# Patient Record
Sex: MALE | Race: WHITE | Employment: FULL TIME | URBAN - METROPOLITAN AREA
[De-identification: names, ages, dates, MRNs, and addresses within clinical notes are randomized per-mention and may not be internally consistent; named-entity substitution may affect disease eponyms.]

---

## 2017-01-31 RX ORDER — CLOPIDOGREL BISULFATE 75 MG/1
TABLET ORAL
COMMUNITY
End: 2017-04-03 | Stop reason: SDUPTHER

## 2017-01-31 NOTE — PROGRESS NOTES
Colon Screen    Patient was contacted by phone for the documentation reflected in this encounter    Patient: Jacqui Tabares MRN: [id number]  SSN: [id number]    YOB: 1960  Age: 49 y.o. Sex: male        Subjective:   Jacqui Tabares wasreferred due to colonoscopy screening follow up recommendation. PCP is Dusty Alvarez DO. Patient referred for colonoscopy for   Abdominal pain, RLQ, Personal history of colon polyps (screening only). Patient denies abdominal pain,rectal pain or bleeding. Abdominal surgeries as described below, specifically none. Family history as described below, specifically father with colon cancer and colon polyp. Last colonoscopy was 2 years ago in December 2015. .      Is patient currently on Oxygen: no  Is patient taking blood thinners, fluid pills,or medication for diabetes? yes       Does the patient have a history of any condition listed below? Cardiac, pulmonary or hepatic disease? CAD  Severe coronary artery disease or aortic stenosis? Yes   Throat cancer? No  Heart transplant? No  Endocarditis? No  Heart valve replacement? No  Congestive heart failure?  No        No Known Allergies    Past Medical History   Diagnosis Date    CAD (coronary artery disease) 2011     coronary artery stent    Chest pain, unspecified      Abnormal nuclear scan    Diabetes ([facility] [address]     Hypercholesterolemia     Palpitations     Personal history of tobacco use, presenting hazards to health      Past Surgical History   Procedure Laterality Date    Stent insertion      Hx orthopaedic       broken nose    Pr colsc flx w/rmvl of tumor polyp lesion snare tq  12/16/15     Dr. Suzi Joseph f/u 2016    Pr cardiac surg procedure unlist  11/04/2011     stent      Family History   Problem Relation Age of Onset    Cancer Mother     Cancer Maternal Uncle     Hypertension Father     Cancer Father      colon/rectal CA [de-identified] yo    Colon Cancer Father     Colon Polyps Father     Heart Disease Other Positive family history of heart disease     Social History   Substance Use Topics    Smoking status: Former Smoker     Quit date: 12/7/2012    Smokeless tobacco: Never Used    Alcohol use 0.6 oz/week     1 Cans of beer, 0 Standard drinks or equivalent per week      Comment: social      Prior to Admission medications    Medication Sig Start Date End Date Taking? Authorizing Provider   clopidogrel (PLAVIX) 75 mg tab Take  by mouth. Yes Historical Provider   dapagliflozin-metformin (XIGDUO XR) 5-1,000 mg TBph Take 1 Tab by mouth daily. 12/13/16  Yes Jessie Cali, DO   metoprolol succinate (TOPROL-XL) 50 mg XL tablet Take 1 Tab by mouth daily. 12/13/16  Yes Jessie Cali, DO   rosuvastatin (CRESTOR) 20 mg tablet TAKE 1 TABLET BY MOUTH ONCE DAILY 11/9/16  Yes Jessie Cali DO   fenofibrate nanocrystallized (TRICOR) 48 mg tablet TAKE 1 TABLET BY MOUTH DAILY 10/3/16  Yes Momo Young, DO   ONETOUCH ULTRA TEST strip TEST ONCE IN THE AM FASTING, THEN 2 HRS AFTER DINNER. DISP: 180 STRIPS. 2/29/16  Yes Jessie Cali DO   Lancets ([facility]) Misc by Does Not Apply route. Test once in the AM fasting, then 2 hrs after dinner. Disp:  A box for 180 1/16/12  Yes Jessie Cali DO   aspirin 81 mg chewable tablet Take 81 mg by mouth daily. Yes Historical Provider   Blood-Glucose Meter (ONE TOUCH ULTRAMINI) monitoring kit by Does Not Apply route. Test 1 am fasting then 2 hrs after dinner 1/16/12   Jessie Cali DO          Review of Systems   Constitutional: Negative. HENT: Negative for congestion, ear discharge, ear pain, hearing loss, nosebleeds, sore throat and tinnitus. Occasional   Eyes: Negative. Respiratory: Negative. Negative for stridor. Cardiovascular: Positive for chest pain. Negative for palpitations, orthopnea, claudication, leg swelling and PND. Due to stress, pt notified cardiologist    Gastrointestinal: Negative. Genitourinary: Negative.     Musculoskeletal: Positive for neck pain. Negative for back pain, falls, joint pain and myalgias. Due to occupation   Skin: Negative. Neurological: Positive for headaches. Endo/Heme/Allergies: Negative. Psychiatric/Behavioral: Negative. Patient notified of holding his Plavix and Aspirin 7 days prior to colonoscopy and to ask permission from his cardiologist.    Risks colonoscopy described- colon injury, missed lesion, anesthesia problems, bleeding       Tiarra Ni, MANJITN  January 31, 8345  3:15 PM

## 2017-04-02 DIAGNOSIS — I25.10 CORONARY ARTERY DISEASE INVOLVING NATIVE CORONARY ARTERY OF NATIVE HEART WITHOUT ANGINA PECTORIS: ICD-10-CM

## 2017-04-03 RX ORDER — CLOPIDOGREL BISULFATE 75 MG/1
TABLET ORAL
Qty: 90 TAB | Refills: 1 | Status: SHIPPED | OUTPATIENT
Start: 2017-04-03 | End: 2017-06-07

## 2017-06-01 DIAGNOSIS — Z12.11 COLON CANCER SCREENING: Primary | ICD-10-CM

## 2017-06-01 RX ORDER — POLYETHYLENE GLYCOL 3350 17 G/17G
POWDER, FOR SOLUTION ORAL
Qty: 255 G | Refills: 0 | Status: SHIPPED | OUTPATIENT
Start: 2017-06-01 | End: 2020-01-08 | Stop reason: ALTCHOICE

## 2017-06-01 RX ORDER — BISACODYL 5 MG
5 TABLET, DELAYED RELEASE (ENTERIC COATED) ORAL DAILY
Qty: 4 TAB | Refills: 0 | Status: SHIPPED | OUTPATIENT
Start: 2017-06-01 | End: 2017-06-07

## 2017-06-06 ENCOUNTER — ANESTHESIA EVENT (OUTPATIENT)
Dept: ENDOSCOPY | Age: 57
End: 2017-06-06
Payer: COMMERCIAL

## 2017-06-07 ENCOUNTER — HOSPITAL ENCOUNTER (OUTPATIENT)
Age: 57
Setting detail: OUTPATIENT SURGERY
Discharge: HOME | End: 2017-06-07
Attending: COLON & RECTAL SURGERY | Admitting: COLON & RECTAL SURGERY
Payer: COMMERCIAL

## 2017-06-07 ENCOUNTER — ANESTHESIA (OUTPATIENT)
Dept: ENDOSCOPY | Age: 57
End: 2017-06-07
Payer: COMMERCIAL

## 2017-06-07 VITALS
SYSTOLIC BLOOD PRESSURE: 114 MMHG | OXYGEN SATURATION: 100 % | HEART RATE: 68 BPM | DIASTOLIC BLOOD PRESSURE: 72 MMHG | BODY MASS INDEX: 25.48 KG/M2 | WEIGHT: 178 LBS | HEIGHT: 70 IN | RESPIRATION RATE: 14 BRPM | TEMPERATURE: 97.5 F

## 2017-06-07 LAB — GLUCOSE BLD STRIP.AUTO-MCNC: 132 MG/DL (ref 70–110)

## 2017-06-07 PROCEDURE — 76040000007: Performed by: COLON & RECTAL SURGERY

## 2017-06-07 PROCEDURE — 76060000032 HC ANESTHESIA 0.5 TO 1 HR: Performed by: COLON & RECTAL SURGERY

## 2017-06-07 PROCEDURE — 74011250636 HC RX REV CODE- 250/636: Performed by: NURSE ANESTHETIST, CERTIFIED REGISTERED

## 2017-06-07 PROCEDURE — 88305 TISSUE EXAM BY PATHOLOGIST: CPT | Performed by: COLON & RECTAL SURGERY

## 2017-06-07 PROCEDURE — 74011250636 HC RX REV CODE- 250/636

## 2017-06-07 PROCEDURE — 77030009426 HC FCPS BIOP ENDOSC BSC -B: Performed by: COLON & RECTAL SURGERY

## 2017-06-07 PROCEDURE — 82962 GLUCOSE BLOOD TEST: CPT

## 2017-06-07 RX ORDER — MAGNESIUM SULFATE 100 %
4 CRYSTALS MISCELLANEOUS AS NEEDED
Status: DISCONTINUED | OUTPATIENT
Start: 2017-06-07 | End: 2017-06-07 | Stop reason: HOSPADM

## 2017-06-07 RX ORDER — SODIUM CHLORIDE, SODIUM LACTATE, POTASSIUM CHLORIDE, CALCIUM CHLORIDE 600; 310; 30; 20 MG/100ML; MG/100ML; MG/100ML; MG/100ML
75 INJECTION, SOLUTION INTRAVENOUS CONTINUOUS
Status: CANCELLED | OUTPATIENT
Start: 2017-06-07

## 2017-06-07 RX ORDER — INSULIN LISPRO 100 [IU]/ML
INJECTION, SOLUTION INTRAVENOUS; SUBCUTANEOUS ONCE
Status: DISCONTINUED | OUTPATIENT
Start: 2017-06-07 | End: 2017-06-07 | Stop reason: HOSPADM

## 2017-06-07 RX ORDER — INSULIN LISPRO 100 [IU]/ML
INJECTION, SOLUTION INTRAVENOUS; SUBCUTANEOUS ONCE
Status: CANCELLED | OUTPATIENT
Start: 2017-06-07 | End: 2017-06-07

## 2017-06-07 RX ORDER — SODIUM CHLORIDE, SODIUM LACTATE, POTASSIUM CHLORIDE, CALCIUM CHLORIDE 600; 310; 30; 20 MG/100ML; MG/100ML; MG/100ML; MG/100ML
75 INJECTION, SOLUTION INTRAVENOUS CONTINUOUS
Status: DISCONTINUED | OUTPATIENT
Start: 2017-06-07 | End: 2017-06-07 | Stop reason: HOSPADM

## 2017-06-07 RX ORDER — MAGNESIUM SULFATE 100 %
4 CRYSTALS MISCELLANEOUS AS NEEDED
Status: CANCELLED | OUTPATIENT
Start: 2017-06-07

## 2017-06-07 RX ORDER — PROPOFOL 10 MG/ML
INJECTION, EMULSION INTRAVENOUS AS NEEDED
Status: DISCONTINUED | OUTPATIENT
Start: 2017-06-07 | End: 2017-06-07 | Stop reason: HOSPADM

## 2017-06-07 RX ORDER — DEXTROSE 50 % IN WATER (D50W) INTRAVENOUS SYRINGE
25-50 AS NEEDED
Status: DISCONTINUED | OUTPATIENT
Start: 2017-06-07 | End: 2017-06-07 | Stop reason: HOSPADM

## 2017-06-07 RX ORDER — DEXTROSE 50 % IN WATER (D50W) INTRAVENOUS SYRINGE
25-50 AS NEEDED
Status: CANCELLED | OUTPATIENT
Start: 2017-06-07

## 2017-06-07 RX ORDER — SODIUM CHLORIDE 0.9 % (FLUSH) 0.9 %
5-10 SYRINGE (ML) INJECTION AS NEEDED
Status: CANCELLED | OUTPATIENT
Start: 2017-06-07

## 2017-06-07 RX ADMIN — PROPOFOL 30 MG: 10 INJECTION, EMULSION INTRAVENOUS at 09:52

## 2017-06-07 RX ADMIN — PROPOFOL 30 MG: 10 INJECTION, EMULSION INTRAVENOUS at 09:39

## 2017-06-07 RX ADMIN — PROPOFOL 30 MG: 10 INJECTION, EMULSION INTRAVENOUS at 09:45

## 2017-06-07 RX ADMIN — SODIUM CHLORIDE, SODIUM LACTATE, POTASSIUM CHLORIDE, AND CALCIUM CHLORIDE 75 ML/HR: 600; 310; 30; 20 INJECTION, SOLUTION INTRAVENOUS at 09:01

## 2017-06-07 RX ADMIN — PROPOFOL 30 MG: 10 INJECTION, EMULSION INTRAVENOUS at 09:42

## 2017-06-07 RX ADMIN — PROPOFOL 30 MG: 10 INJECTION, EMULSION INTRAVENOUS at 09:59

## 2017-06-07 RX ADMIN — PROPOFOL 50 MG: 10 INJECTION, EMULSION INTRAVENOUS at 10:03

## 2017-06-07 RX ADMIN — PROPOFOL 90 MG: 10 INJECTION, EMULSION INTRAVENOUS at 09:34

## 2017-06-07 RX ADMIN — PROPOFOL 50 MG: 10 INJECTION, EMULSION INTRAVENOUS at 09:49

## 2017-06-07 RX ADMIN — PROPOFOL 30 MG: 10 INJECTION, EMULSION INTRAVENOUS at 10:08

## 2017-06-07 RX ADMIN — PROPOFOL 30 MG: 10 INJECTION, EMULSION INTRAVENOUS at 09:35

## 2017-06-07 NOTE — DISCHARGE INSTRUCTIONS
Per Dr. Quintero Memory: FOLLOW UP VISIT Appointment in: Other (Specify) No aspirin, Plavix or ibuprofen (e.g. Aleve, Motrin, Advil) for 5 days. Repeat colonoscopy in 5 years. Colonoscopy: What to Expect at [address] [place] [address]  After you have a colonoscopy, you will stay at the clinic for 1 to 2 hours until the medicines wear off. Then you can go home. But you will need to arrange for a ride. Your doctor will tell you when you can eat and do your other usual activities. Your doctor will talk to you about when you will need your next colonoscopy. Your doctor can help you decide how often you need to be checked. This will depend on the results of your test and your risk for colorectal cancer. After the test, you may be bloated or have gas pains. You may need to pass gas. If a biopsy was done or a polyp was removed, you may have streaks of blood in your stool (feces) for a few days. This care sheet gives you a general idea about how long it will take for you to recover. But each person recovers at a different pace. Follow the steps below to get better as quickly as possible. How can you care for yourself at home? Activity  · Rest when you feel tired. · You can do your normal activities when it feels okay to do so. Diet  · Follow your doctor's directions for eating. · Unless your doctor has told you not to, drink plenty of fluids. This helps to replace the fluids that were lost during the colon prep. · Do not drink alcohol. Medicines  · If polyps were removed or a biopsy was done during the test, your doctor may tell you not to take aspirin or other anti-inflammatory medicines for a few days. These include ibuprofen (Advil, Motrin) and naproxen (Aleve). Other instructions  · For your safety, do not drive or operate machinery until the medicine wears off and you can think clearly.  Your doctor may tell you not to drive or operate machinery until the day after your test.  · Do not sign legal documents or make major decisions until the medicine wears off and you can think clearly. The anesthesia can make it hard for you to fully understand what you are agreeing to. Follow-up care is a key part of your treatment and safety. Be sure to make and go to all appointments, and call your doctor if you are having problems. It's also a good idea to know your test results and keep a list of the medicines you take. When should you call for help? Call 911 anytime you think you may need emergency care. For example, call if:  · You passed out (lost consciousness). · You pass maroon or bloody stools. · You have severe belly pain. Call your doctor now or seek immediate medical care if:  · Your stools are black and tarlike. · Your stools have streaks of blood, but you did not have a biopsy or any polyps removed. · You have belly pain, or your belly is swollen and firm. · You vomit. · You have a fever. · You are very dizzy. Watch closely for changes in your health, and be sure to contact your doctor if you have any problems. Where can you learn more? Go to [employer] [facility] [facility]  Enter E264 in the search box to learn more about \"Colonoscopy: What to Expect at Home. \"   © 3017-0557 [employer]. Care instructions adapted under license by Denia Presley (which disclaims liability or warranty for this information). This care instruction is for use with your licensed healthcare professional. If you have questions about a medical condition or this instruction, always ask your healthcare professional. [place] [address] any warranty or liability for your use of this information. Content Version: 89.8.969780; Current as of: November 14, 2014     Colon Polyps: Care Instructions  Your Care Instructions    Colon polyps are growths in the colon or the rectum. The cause of most colon polyps is not known, and most people who get them do not have any problems. But a certain kind can turn into cancer.  For this reason, regular testing for colon polyps is important for people age 48 and older and anyone who has an increased risk for colon cancer. Polyps are usually found through routine colon cancer screening tests. Although most colon polyps are not cancerous, they are usually removed and then tested for cancer. Screening for colon cancer saves lives because the cancer can usually be cured if it is caught early. If you have a polyp that is the type that can turn into cancer, you may need more tests to examine your entire colon. The doctor will remove any other polyps that he or she finds, and you will be tested more often. Follow-up care is a key part of your treatment and safety. Be sure to make and go to all appointments, and call your doctor if you are having problems. It's also a good idea to know your test results and keep a list of the medicines you take. How can you care for yourself at home? Regular exams to look for colon polyps are the best way to prevent polyps from turning into colon cancer. These can include stool tests, sigmoidoscopy, colonoscopy, and CT colonography. Talk with your doctor about a testing schedule that is right for you. To prevent polyps  There is no home treatment that can prevent colon polyps. But these steps may help lower your risk for cancer. · Stay active. Being active can help you get to and stay at a healthy weight. Try to exercise on most days of the week. Walking is a good choice. · Eat well. Choose a variety of vegetables, fruits, legumes (such as peas and beans), fish, poultry, and whole grains. · Do not smoke. If you need help quitting, talk to your doctor about stop-smoking programs and medicines. These can increase your chances of quitting for good. · If you drink alcohol, limit how much you drink. Limit alcohol to 2 drinks a day for men and 1 drink a day for women. When should you call for help?   Call your doctor now or seek immediate medical care if:  · You have severe belly pain. · Your stools are maroon or very bloody. Watch closely for changes in your health, and be sure to contact your doctor if:  · You have a fever. · You have nausea or vomiting. · You have a change in bowel habits (new constipation or diarrhea). · Your symptoms get worse or are not improving as expected. Where can you learn more? Go to http://rodney-jacky.info/. Enter 95 [id number] in the search box to learn more about \"Colon Polyps: Care Instructions. \"  Current as of: August 24, 2016  Content Version: 11.2  © 6027-0611 [employer]. Care instructions adapted under license by [employer] (which disclaims liability or warranty for this information). If you have questions about a medical condition or this instruction, always ask your healthcare professional. [address] any warranty or liability for your use of this information. DISCHARGE SUMMARY from Nurse     POST-PROCEDURE INSTRUCTIONS:    Call your Physician if you:  ? Observe any excess bleeding. ? Develop a temperature over 100.5o F.  ? Experience abdominal, shoulder or chest pain. ? Notice any signs of decreased circulation or nerve impairment to an extremity such as a change in color, persistent numbness, tingling, coldness or increase in pain. ? Vomit blood or you have nausea and vomiting lasting longer than 4 hours. ? Are unable to take medications. ? Are unable to urinate within 8 hours after discharge following general anesthesia or intravenous sedation. For the next 24 hours after receiving general anesthesia or intravenous sedation, or while taking prescription Narcotics, limit your activities:  ? Do NOT drive a motor vehicle, operate hazard machinery or power tools, or perform tasks that require coordination. The medication you received during your procedure may have some effect on your mental awareness.   ? Do NOT make important personal or business decisions. The medication you received during your procedure may have some effect on your mental awareness. ? Do NOT drink alcoholic beverages. These drinks do not mix well with the medications that have been given to you. ? Upon discharge from the hospital, you must be accompanied by a responsible adult. ? Resume your diet as directed by your physician. ? Resume medications as your physician has prescribed. ? Please give a list of your current medications to your Primary Care Provider. ? Please update this list whenever your medications are discontinued, doses are changed, or new medications (including over-the-counter products) are added. ? Please carry medication information at all times in case of emergency situations. These are general instructions for a healthy lifestyle:    No smoking/ No tobacco products/ Avoid exposure to second hand smoke.  Surgeon General's Warning:  Quitting smoking now greatly reduces serious risk to your health. Obesity, smoking, and a sedentary lifestyle greatly increase your risk for illness.  A healthy diet, regular physical exercise & weight monitoring are important for maintaining a healthy lifestyle   You may be retaining fluid if you have a history of heart failure or if you experience any of the following symptoms:  Weight gain of 3 pounds or more overnight or 5 pounds in a week, increased swelling in our hands or feet or shortness of breath while lying flat in bed. Please call your doctor as soon as you notice any of these symptoms; do not wait until your next office visit. Recognize signs and symptoms of STROKE:  F  -  Face looks uneven  A  -  Arms unable to move or move unevenly  S  -  Speech slurred or non-existent  T  -  Time to call 911 - as soon as signs and symptoms begin - DO NOT go back to bed or wait to see If you get better - TIME IS BRAIN.     Colorectal Screening   Colorectal cancer almost always develops from precancerous polyps (abnormal growths) in the colon or rectum. Screening tests can find precancerous polyps, so that they can be removed before they turn into cancer. Screening tests can also find colorectal cancer early, when treatment works best.  [facility] Speak with your physician about when you should begin screening and how often you should be tested. Additional Information    If you have questions, please call [phone]. Remember, MyChart is NOT to be used for urgent needs. For medical emergencies, dial 911. Educational references and/or instructions provided during this visit included:    Colon Polyps      APPOINTMENTS:    Please make a follow-up appointment with your physician. Discharge information has been reviewed with the patient. The patient verbalized understanding.

## 2017-06-07 NOTE — IP AVS SNAPSHOT
Maral Landaverde 
 
 
 [address] [address] [address] [address] [phone] Patient: Vilma Leigh MRN: [id number] ALIREZA:5/46[birth year]23 You are allergic to the following No active allergies Recent Documentation Height Weight BMI Smoking Status 1.778 m 80.7 kg 25.54 kg/m2 Former Smoker Emergency Contacts Name Discharge Info Relation Home Work Mobile [address] CAREGIVER [3] Spouse [3] [phone] About your hospitalization You were admitted on:  June 7, 2017 You last received care in the:  [facility] You were discharged on:  June 7, 2017 Unit phone number:  [phone] Why you were hospitalized Your primary diagnosis was:  Not on File Providers Seen During Your Hospitalizations Provider Role Specialty Primary office phone Braulio Delacruz MD Attending Provider Colon and Rectal Surgery [phone] Your Primary Care Physician (PCP) Primary Care Physician Office Phone Office Fax Arlyn Lindo [phone] Follow-up Information Follow up With Details Comments Contact Info Braulio Delacruz MD   [address] [facility] [address] [facility] [address] [place] [address] 
[phone] [facility], DO   [address] [facility] [address] [facility] Amee Gutierrez [address] [id number] [phone] Current Discharge Medication List  
  
CONTINUE these medications which have NOT CHANGED Dose & Instructions Dispensing Information Comments Morning Noon Evening Bedtime Blood-Glucose Meter monitoring kit Commonly known as:  Pelon Garza Your last dose was: Your next dose is:    
   
   
 by Does Not Apply route. Test 1 am fasting then 2 hrs after dinner Quantity:  1 Kit Refills:  0  
     
   
   
   
  
 dapagliflozin-metformin 5-1,000 mg [facility]  
 Commonly known as:  XIGDUO XR Your last dose was: Your next dose is:    
   
   
 Dose:  1 Tab Take 1 Tab by mouth daily. Quantity:  90 Tab Refills:  1  
     
   
   
   
  
 fenofibrate nanocrystallized 48 mg tablet Commonly known as:  [employer] Your last dose was: Your next dose is: TAKE 1 TABLET BY MOUTH DAILY Quantity:  90 Tab Refills:  1 Lancets Misc Commonly known as: One Touch [employer] Esters Your last dose was: Your next dose is:    
   
   
 by Does Not Apply route. Test once in the AM fasting, then 2 hrs after dinner. Disp:  A box for 180 Quantity:  1 Package Refills:  11  
     
   
   
   
  
 metoprolol succinate 50 mg XL tablet Commonly known as:  TOPROL-XL Your last dose was: Your next dose is:    
   
   
 Dose:  50 mg Take 1 Tab by mouth daily. Quantity:  90 Tab Refills:  1 ONETOUCH ULTRA TEST strip Generic drug:  glucose blood VI test strips Your last dose was: Your next dose is:    
   
   
 TEST ONCE IN THE AM FASTING, THEN 2 HRS AFTER DINNER. DISP: 180 STRIPS. Quantity:  180 Strip Refills:  2  
     
   
   
   
  
 polyethylene glycol 17 gram/dose powder Commonly known as:  [employer]ans Your last dose was: Your next dose is: Take as directed by office Quantity:  255 g Refills:  0  
     
   
   
   
  
 rosuvastatin 20 mg tablet Commonly known as:  CRESTOR Your last dose was: Your next dose is: TAKE 1 TABLET BY MOUTH ONCE DAILY Quantity:  90 Tab Refills:  1 STOP taking these medications   
 aspirin 81 mg chewable tablet  
   
  
 bisacodyl 5 mg EC tablet Commonly known as:  DULCOLAX (BISACODYL)  
   
  
 clopidogrel 75 mg Tab Commonly known as:  PLAVIX Discharge Instructions Per Dr. Irene Tang: FOLLOW UP VISIT Appointment in: Other (Specify) No aspirin, Plavix or ibuprofen (e.g. Aleve, Motrin, Advil) for 5 days. Repeat colonoscopy in 5 years. Colonoscopy: What to Expect at [facility] Your Recovery After you have a colonoscopy, you will stay at the clinic for 1 to 2 hours until the medicines wear off. Then you can go home. But you will need to arrange for a ride. Your doctor will tell you when you can eat and do your other usual activities. Your doctor will talk to you about when you will need your next colonoscopy. Your doctor can help you decide how often you need to be checked. This will depend on the results of your test and your risk for colorectal cancer. After the test, you may be bloated or have gas pains. You may need to pass gas. If a biopsy was done or a polyp was removed, you may have streaks of blood in your stool (feces) for a few days. This care sheet gives you a general idea about how long it will take for you to recover. But each person recovers at a different pace. Follow the steps below to get better as quickly as possible. How can you care for yourself at home? Activity · Rest when you feel tired. · You can do your normal activities when it feels okay to do so. Diet · Follow your doctor's directions for eating. · Unless your doctor has told you not to, drink plenty of fluids. This helps to replace the fluids that were lost during the colon prep. · Do not drink alcohol. Medicines · If polyps were removed or a biopsy was done during the test, your doctor may tell you not to take aspirin or other anti-inflammatory medicines for a few days. These include ibuprofen (Advil, Motrin) and naproxen (Aleve). Other instructions · For your safety, do not drive or operate machinery until the medicine wears off and you can think clearly.  Your doctor may tell you not to drive or operate machinery until the day after your test. 
 · Do not sign legal documents or make major decisions until the medicine wears off and you can think clearly. The anesthesia can make it hard for you to fully understand what you are agreeing to. Follow-up care is a key part of your treatment and safety. Be sure to make and go to all appointments, and call your doctor if you are having problems. It's also a good idea to know your test results and keep a list of the medicines you take. When should you call for help? Call 911 anytime you think you may need emergency care. For example, call if: 
· You passed out (lost consciousness). · You pass maroon or bloody stools. · You have severe belly pain. Call your doctor now or seek immediate medical care if: 
· Your stools are black and tarlike. · Your stools have streaks of blood, but you did not have a biopsy or any polyps removed. · You have belly pain, or your belly is swollen and firm. · You vomit. · You have a fever. · You are very dizzy. Watch closely for changes in your health, and be sure to contact your doctor if you have any problems. Where can you learn more? Go to [employer].be Enter E264 in the search box to learn more about \"Colonoscopy: What to Expect at Home. \"  
© 7482-3470 Healthwise, Incorporated. Care instructions adapted under license by Eleonora Mendes (which disclaims liability or warranty for this information). This care instruction is for use with your licensed healthcare professional. If you have questions about a medical condition or this instruction, always ask your healthcare professional. [place] [address] any warranty or liability for your use of this information. Content Version: 32.5.286227; Current as of: November 14, 2014 Colon Polyps: Care Instructions Your Care Instructions Colon polyps are growths in the colon or the rectum.  The cause of most colon polyps is not known, and most people who get them do not have any problems. But a certain kind can turn into cancer. For this reason, regular testing for colon polyps is important for people age 48 and older and anyone who has an increased risk for colon cancer. Polyps are usually found through routine colon cancer screening tests. Although most colon polyps are not cancerous, they are usually removed and then tested for cancer. Screening for colon cancer saves lives because the cancer can usually be cured if it is caught early. If you have a polyp that is the type that can turn into cancer, you may need more tests to examine your entire colon. The doctor will remove any other polyps that he or she finds, and you will be tested more often. Follow-up care is a key part of your treatment and safety. Be sure to make and go to all appointments, and call your doctor if you are having problems. It's also a good idea to know your test results and keep a list of the medicines you take. How can you care for yourself at home? Regular exams to look for colon polyps are the best way to prevent polyps from turning into colon cancer. These can include stool tests, sigmoidoscopy, colonoscopy, and CT colonography. Talk with your doctor about a testing schedule that is right for you. To prevent polyps There is no home treatment that can prevent colon polyps. But these steps may help lower your risk for cancer. · Stay active. Being active can help you get to and stay at a healthy weight. Try to exercise on most days of the week. Walking is a good choice. · Eat well. Choose a variety of vegetables, fruits, legumes (such as peas and beans), fish, poultry, and whole grains. · Do not smoke. If you need help quitting, talk to your doctor about stop-smoking programs and medicines. These can increase your chances of quitting for good. · If you drink alcohol, limit how much you drink. Limit alcohol to 2 drinks a day for men and 1 drink a day for women. When should you call for help? Call your doctor now or seek immediate medical care if: 
· You have severe belly pain. · Your stools are maroon or very bloody. Watch closely for changes in your health, and be sure to contact your doctor if: 
· You have a fever. · You have nausea or vomiting. · You have a change in bowel habits (new constipation or diarrhea). · Your symptoms get worse or are not improving as expected. Where can you learn more? Go to http://rodney-ajcky.info/. Enter [id number] in the search box to learn more about \"Colon Polyps: Care Instructions. \" Current as of: August 24, 2016 Content Version: 11.2 © 6076-2630 [employer]. Care instructions adapted under license by [employer] (which disclaims liability or warranty for this information). If you have questions about a medical condition or this instruction, always ask your healthcare professional. [place] [address] any warranty or liability for your use of this information. DISCHARGE SUMMARY from Nurse POST-PROCEDURE INSTRUCTIONS: 
 
Call your Physician if you: 
? Observe any excess bleeding. ? Develop a temperature over 100.5o F. 
? Experience abdominal, shoulder or chest pain. ? Notice any signs of decreased circulation or nerve impairment to an extremity such as a change in color, persistent numbness, tingling, coldness or increase in pain. ? Vomit blood or you have nausea and vomiting lasting longer than 4 hours. ? Are unable to take medications. ? Are unable to urinate within 8 hours after discharge following general anesthesia or intravenous sedation. For the next 24 hours after receiving general anesthesia or intravenous sedation, or while taking prescription Narcotics, limit your activities: 
? Do NOT drive a motor vehicle, operate hazard machinery or power tools, or perform tasks that require coordination.   The medication you received during your procedure may have some effect on your mental awareness. ? Do NOT make important personal or business decisions. The medication you received during your procedure may have some effect on your mental awareness. ? Do NOT drink alcoholic beverages. These drinks do not mix well with the medications that have been given to you. ? Upon discharge from the hospital, you must be accompanied by a responsible adult. ? Resume your diet as directed by your physician. ? Resume medications as your physician has prescribed. ? Please give a list of your current medications to your Primary Care Provider. ? Please update this list whenever your medications are discontinued, doses are changed, or new medications (including over-the-counter products) are added. ? Please carry medication information at all times in case of emergency situations. These are general instructions for a healthy lifestyle: No smoking/ No tobacco products/ Avoid exposure to second hand smoke. ? Surgeon General's Warning:  Quitting smoking now greatly reduces serious risk to your health. Obesity, smoking, and a sedentary lifestyle greatly increase your risk for illness. ? A healthy diet, regular physical exercise & weight monitoring are important for maintaining a healthy lifestyle ? You may be retaining fluid if you have a history of heart failure or if you experience any of the following symptoms:  Weight gain of 3 pounds or more overnight or 5 pounds in a week, increased swelling in our hands or feet or shortness of breath while lying flat in bed. Please call your doctor as soon as you notice any of these symptoms; do not wait until your next office visit. Recognize signs and symptoms of STROKE: 
F  -  Face looks uneven A  -  Arms unable to move or move unevenly S  -  Speech slurred or non-existent T  -  Time to call 911 - as soon as signs and symptoms begin - DO NOT go back to bed or wait to see If you get better - TIME IS BRAIN. Colorectal Screening ? Colorectal cancer almost always develops from precancerous polyps (abnormal growths) in the colon or rectum. Screening tests can find precancerous polyps, so that they can be removed before they turn into cancer. Screening tests can also find colorectal cancer early, when treatment works best. 
? Speak with your physician about when you should begin screening and how often you should be tested. Additional Information If you have questions, please call [phone]. Remember, [employer] is NOT to be used for urgent needs. For medical emergencies, dial 911. Educational references and/or instructions provided during this visit included: 
 
Colon Polyps APPOINTMENTS: 
 
Please make a follow-up appointment with your physician. Discharge information has been reviewed with the patient. The patient verbalized understanding. Discharge Orders None Introducing [facility] SERVICES! Delio Loza introduces [employer] patient portal. Now you can access parts of your medical record, email your doctor's office, and request medication refills online. 1. In your internet browser, go to [email or web address] 2. Click on the First Time User? Click Here link in the Sign In box. You will see the New Member Sign Up page. 3. Enter your [employer] Access Code exactly as it appears below. You will not need to use this code after youve completed the sign-up process. If you do not sign up before the expiration date, you must request a new code. · [employer] Access Code: [email or web address] Expires: 9/5/2017 10:29 AM 
 
4. Enter the last four digits of your Social Security Number (xxxx) and Date of Birth (mm/dd/yyyy) as indicated and click Submit. You will be taken to the next sign-up page. 5. Create a [employer] ID.  This will be your [employer] login ID and cannot be changed, so think of one that is secure and easy to remember. 6. Create a Mobile System 7 password. You can change your password at any time. 7. Enter your Password Reset Question and Answer. This can be used at a later time if you forget your password. 8. Enter your e-mail address. You will receive e-mail notification when new information is available in [address] 9. Click Sign Up. You can now view and download portions of your medical record. 10. Click the Download Summary menu link to download a portable copy of your medical information. If you have questions, please visit the Frequently Asked Questions section of the [employer] website. Remember, [employer] is NOT to be used for urgent needs. For medical emergencies, dial 911. Now available from your iPhone and Android! General Information Please provide this summary of care documentation to your next provider. Patient Signature:  ____________________________________________________________ Date:  ____________________________________________________________  
  
Rigoberto León Provider Signature:  ____________________________________________________________ Date:  ____________________________________________________________

## 2017-06-07 NOTE — PROCEDURES
[facility]  [address] [place] [address] [address] [facility], [address]  [phone]                    Colonoscopy Procedure Note      Romelia Pierson  1960  [id number]                Date of Procedure: 6/7/2017    Preoperative diagnosis: Z86.010,  Personal history of colon polyps    Postoperative diagnosis: Polyps of Cecum, Polyps of Sigmoid, Rectum    :  Santiago Patton MD    Assistant(s): Endoscopy Technician-1: Carmelo Romero RN-1: Eli Ahmadi RN    Sedation: See Anesthesia Record    Procedure Details:  Prior to the procedure, a history and physical were performed. The patients medications, allergies and sensitivities were reviewed and all questions were answered. After informed consent was obtained for the procedure, with all risks and benefits of procedure explained. The patient was taken to the endoscopy suite and placed in the left lateral decubitus position. Patient identification and proposed procedure were verified prior to the procedure by the nurse and I. Following sequential administration of sedation as per Anesthesia, a digital rectal exam was performed and was normal.  The Olympus video colonoscope was introduced through the anus and advanced to cecum, which was identified by the ileocecal valve and appendiceal orifice. The quality of preparation was good. The colonoscope was slowly withdrawn and the mucosa examined for any abnormalities. Cecal withdrawl time was greater than six minutes. The patient tolerated the procedure well. Findings/Interventions:   Polyps - #1, 5 mm in size, located in the cecum, removed by cold biopsy and sent for pathology, - #2, 5 mm in size, located in the cecum, removed by cold biopsy and sent for pathology, - #3, 5 mm in size, located in the sigmoid, removed by cold biopsy and sent for pathology, - #4, 5 mm in size, located in the rectum, removed by cold biopsy and sent for pathology.     No evidence of recurrent polyp in ascending colon. EBL: none    Recommendations: -Repeat colonoscopy in 5 years. NO aspirin for 5 days.      Discharge Disposition:  Eric Duran MD  6/7/2017  10:12 AM

## 2017-06-07 NOTE — H&P
HPI: Anderson Cruz is a 49 y.o. male presenting with chief complain of history of large ascending colon polyp, [facility] of [facility] 1st degree relative. Past Medical History:   Diagnosis Date    CAD (coronary artery disease) 2011    coronary artery stent    Chest pain, unspecified     Abnormal nuclear scan    Diabetes ([address]     Hypercholesterolemia     Palpitations     Personal history of tobacco use, presenting hazards to health        Past Surgical History:   Procedure Laterality Date    CARDIAC SURG PROCEDURE UNLIST  11/04/2011    stent    HX ORTHOPAEDIC      broken nose    IA COLSC FLX W/RMVL OF TUMOR POLYP LESION SNARE TQ  12/16/15    Dr. Hernandez Cea f/u 2016    STENT INSERTION         Family History   Problem Relation Age of Onset    Cancer Mother     Cancer Maternal Uncle     Hypertension Father     Cancer Father      colon/rectal CA [de-identified] yo    Colon Cancer Father     Colon Polyps Father     Heart Disease Other      Positive family history of heart disease       Social History     Social History    Marital status:      Spouse name: N/A    Number of children: N/A    Years of education: N/A     Social History Main Topics    Smoking status: Former Smoker     Quit date: 12/7/2012    Smokeless tobacco: Never Used    Alcohol use 0.6 oz/week     1 Cans of beer, 0 Standard drinks or equivalent per week      Comment: social    Drug use: No    Sexual activity: Yes     Partners: Female     Other Topics Concern    None     Social History Narrative       Review of Systems - negative    Outpatient Prescriptions Marked as Taking for the 6/7/17 encounter [facility] Encounter)   Medication Sig Dispense Refill    bisacodyl (DULCOLAX, BISACODYL,) 5 mg EC tablet Take 1 Tab by mouth daily. 4 Tab 0    polyethylene glycol (MIRALAX) 17 gram/dose powder Take as directed by office 255 g 0    clopidogrel (PLAVIX) 75 mg tab TAKE 1 TAB BY MOUTH DAILY.  90 Tab 1    dapagliflozin-metformin (XIGDUO XR) 5-1,000 mg TBph Take 1 Tab by mouth daily. 90 Tab 1    metoprolol succinate (TOPROL-XL) 50 mg XL tablet Take 1 Tab by mouth daily. 90 Tab 1    rosuvastatin (CRESTOR) 20 mg tablet TAKE 1 TABLET BY MOUTH ONCE DAILY 90 Tab 1    fenofibrate nanocrystallized (TRICOR) 48 mg tablet TAKE 1 TABLET BY MOUTH DAILY 90 Tab 1    ONETOUCH ULTRA TEST strip TEST ONCE IN THE AM FASTING, THEN 2 HRS AFTER DINNER. DISP: 180 STRIPS. 180 Strip 2    Blood-Glucose Meter (ONE TOUCH ULTRAMINI) monitoring kit by Does Not Apply route. Test 1 am fasting then 2 hrs after dinner 1 Kit 0    Lancets (ONE TOUCH DELICA) Misc by Does Not Apply route. Test once in the AM fasting, then 2 hrs after dinner. Disp:  A box for 180 1 Package 11    aspirin 81 mg chewable tablet Take 81 mg by mouth daily. No Known Allergies    Vitals:    05/30/17 1205 06/07/17 0852   BP:  134/77   Pulse:  69   Resp:  20   Temp:  98.9 °F (37.2 °C)   SpO2:  99%   Weight: 80.7 kg (178 lb) 80.7 kg (178 lb)   Height: 5' 10\" (1.778 m) 5' 10\" (1.778 m)       Physical Exam   Constitutional: He appears well-developed and well-nourished. HENT:   Head: Normocephalic and atraumatic. Eyes: Conjunctivae and EOM are normal.   Abdominal: Soft. He exhibits no distension and no mass. There is no tenderness. Musculoskeletal: Normal range of motion. Lymphadenopathy:     He has no cervical adenopathy. Right: No inguinal adenopathy present. Left: No inguinal adenopathy present. Neurological: He exhibits normal muscle tone. Skin: Skin is warm and dry. Psychiatric: He has a normal mood and affect. His speech is normal.       Assessment / Plan    colonoscopy    The diagnoses and plan were discussed with the patient. All questions answered. Plan of care agreed to by all concerned.

## 2017-06-07 NOTE — ANESTHESIA PREPROCEDURE EVALUATION
Anesthetic History   No history of anesthetic complications            Review of Systems / Medical History  Patient summary reviewed and pertinent labs reviewed    Pulmonary  Within defined limits                 Neuro/Psych   Within defined limits           Cardiovascular              CAD    Exercise tolerance: >4 METS     GI/Hepatic/Renal  Within defined limits              Endo/Other    Diabetes: type 2         Other Findings   Comments:   Risk Factors for Postoperative nausea/vomiting:       History of postoperative nausea/vomiting? NO       Female? NO       Motion sickness? NO       Intended opioid administration for postoperative analgesia? NO      Smoking Abstinence  Current Smoker? NO  Elective Surgery? YES  Seen preoperatively by anesthesiologist or proxy prior to day of surgery? YES  Pt abstained from smoking 24 hours prior to anesthesia?  N/A           Physical Exam    Airway  Mallampati: III  TM Distance: 4 - 6 cm  Neck ROM: normal range of motion   Mouth opening: Diminished (comment)     Cardiovascular    Rhythm: regular  Rate: normal         Dental  No notable dental hx       Pulmonary  Breath sounds clear to auscultation               Abdominal  GI exam deferred       Other Findings            Anesthetic Plan    ASA: 3  Anesthesia type: MAC            Anesthetic plan and risks discussed with: Patient

## 2017-06-07 NOTE — ANESTHESIA POSTPROCEDURE EVALUATION
Post-Anesthesia Evaluation and Assessment    Patient: Mirian Genao MRN: [id number]  SSN: [id number]    YOB: 1960  Age: 49 y.o. Sex: male       Cardiovascular Function/Vital Signs  Visit Vitals    /82    Pulse 74    Temp 36 °C (96.8 °F)    Resp 12    Ht 5' 10\" (1.778 m)    Wt 80.7 kg (178 lb)    SpO2 96%    BMI 25.54 kg/m2       Patient is status post MAC anesthesia for Procedure(s):  COLONOSCOPY / polypectomy. Nausea/Vomiting: None    Postoperative hydration reviewed and adequate. Pain:  Pain Scale 1: Numeric (0 - 10) (06/07/17 0852)  Pain Intensity 1: 0 (06/07/17 0932)   Managed    Neurological Status: At baseline    Mental Status and Level of Consciousness: Arousable    Pulmonary Status:   O2 Device: Nasal cannula (06/07/17 1011)   Adequate oxygenation and airway patent    Complications related to anesthesia: None    Post-anesthesia assessment completed.  No concerns    Signed By: Gay Sahu MD     June 7, 2017

## 2017-06-20 ENCOUNTER — TELEPHONE (OUTPATIENT)
Dept: SURGERY | Age: 57
End: 2017-06-20

## 2017-06-20 NOTE — TELEPHONE ENCOUNTER
----- Message from Santiago Patton MD sent at 6/9/2017 12:40 PM EDT -----  Benign polyp(s). Repeat colonoscopy in 5 years as planned. Notified patient of pathology results. Lisa in [place] for 5 years. Patient understands.

## 2017-08-14 ENCOUNTER — OFFICE VISIT (OUTPATIENT)
Dept: FAMILY MEDICINE CLINIC | Age: 57
End: 2017-08-14

## 2017-08-14 VITALS
WEIGHT: 176 LBS | OXYGEN SATURATION: 97 % | HEART RATE: 80 BPM | DIASTOLIC BLOOD PRESSURE: 84 MMHG | TEMPERATURE: 98.4 F | RESPIRATION RATE: 12 BRPM | BODY MASS INDEX: 25.2 KG/M2 | HEIGHT: 70 IN | SYSTOLIC BLOOD PRESSURE: 134 MMHG

## 2017-08-14 DIAGNOSIS — E11.9 TYPE 2 DIABETES MELLITUS WITHOUT COMPLICATION, WITHOUT LONG-TERM CURRENT USE OF INSULIN (HCC): ICD-10-CM

## 2017-08-14 DIAGNOSIS — E78.00 HYPERCHOLESTEREMIA: ICD-10-CM

## 2017-08-14 DIAGNOSIS — I25.10 CORONARY ARTERY DISEASE INVOLVING NATIVE CORONARY ARTERY OF NATIVE HEART WITHOUT ANGINA PECTORIS: ICD-10-CM

## 2017-08-14 RX ORDER — METOPROLOL SUCCINATE 50 MG/1
50 TABLET, EXTENDED RELEASE ORAL DAILY
Qty: 90 TAB | Refills: 1 | Status: SHIPPED | OUTPATIENT
Start: 2017-08-14 | End: 2018-08-09 | Stop reason: SDUPTHER

## 2017-08-14 RX ORDER — FENOFIBRATE 48 MG/1
TABLET, COATED ORAL
Qty: 90 TAB | Refills: 1 | Status: SHIPPED | OUTPATIENT
Start: 2017-08-14 | End: 2018-07-25 | Stop reason: SDUPTHER

## 2017-08-14 RX ORDER — ROSUVASTATIN CALCIUM 20 MG/1
TABLET, COATED ORAL
Qty: 90 TAB | Refills: 1 | Status: SHIPPED | OUTPATIENT
Start: 2017-08-14 | End: 2018-08-09 | Stop reason: SDUPTHER

## 2017-08-14 NOTE — MR AVS SNAPSHOT
Visit Information Date & Time Provider Department Dept. Phone Encounter #  
 8/14/2017  4:20 PM Elton Yun, [address]  [id number] Follow-up Instructions Return in about 6 months (around 2/14/2018) for diabetes, blood pressure, cholesterol. Follow-up and Disposition History Upcoming Health Maintenance Date Due Hepatitis C Screening 1960 Pneumococcal 19-64 Medium Risk (1 of 1 - PPSV23) 6/19/1981 DTaP/Tdap/Td series (1 - Tdap) 6/19/1983 EYE EXAM RETINAL OR DILATED Q1 8/14/2015 HEMOGLOBIN A1C Q6M 2/8/2017 FOOT EXAM Q1 4/1/2017 MICROALBUMIN Q1 4/1/2017 INFLUENZA AGE 9 TO ADULT 8/1/2017 LIPID PANEL Q1 8/8/2017 COLONOSCOPY 6/7/2018 Allergies as of 8/14/2017  Review Complete On: 8/14/2017 By: Elton Yun, DO No Known Allergies Current Immunizations  Never Reviewed No immunizations on file. Not reviewed this visit You Were Diagnosed With   
  
 Codes Comments Type 2 diabetes mellitus without complication, without long-term current use of insulin (HCC)     ICD-10-CM: E11.9 ICD-9-CM: 250.00 Coronary artery disease involving native coronary artery of native heart without angina pectoris     ICD-10-CM: I25.10 ICD-9-CM: 414.01 Hypercholesteremia     ICD-10-CM: E78.00 ICD-9-CM: 272.0 Vitals BP Pulse Temp Resp Height(growth percentile) Weight(growth percentile) 134/84 (BP 1 Location: Right arm, BP Patient Position: Sitting) 80 98.4 °F (36.9 °C) (Oral) 12 5' 10\" (1.778 m) 176 lb (79.8 kg) SpO2 BMI Smoking Status 97% 25.25 kg/m2 Former Smoker BMI and BSA Data Body Mass Index Body Surface Area  
 25.25 kg/m 2 1.99 m 2 Preferred Pharmacy Pharmacy Name Phone [id number] Jose Luis Barbour, [address] [facility],4Th Floor [facility] [phone] Your Updated Medication List  
  
   
 This list is accurate as of: 8/14/17  4:26 PM.  Always use your most recent med list.  
  
  
  
  
 Blood-Glucose Meter monitoring kit Commonly known as:  Azra Cathi  
by Does Not Apply route. Test 1 am fasting then 2 hrs after dinner  
  
 dapagliflozin-metformin 5-1,000 mg Tbph  
Commonly known as:  XIGDUO XR Take 1 Tab by mouth daily. fenofibrate nanocrystallized 48 mg tablet Commonly known as:  TRICOR  
TAKE 1 TABLET BY MOUTH DAILY Lancets Misc Commonly known as: One Touch Delica  
by Does Not Apply route. Test once in the AM fasting, then 2 hrs after dinner. Disp:  A box for 180  
  
 metoprolol succinate 50 mg XL tablet Commonly known as:  TOPROL-XL Take 1 Tab by mouth daily. ONETOUCH ULTRA TEST strip Generic drug:  glucose blood VI test strips TEST ONCE IN THE AM FASTING, THEN 2 HRS AFTER DINNER. DISP: 180 STRIPS. polyethylene glycol 17 gram/dose powder Commonly known as:  Kimberlee Lobstein Take as directed by office  
  
 rosuvastatin 20 mg tablet Commonly known as:  CRESTOR  
TAKE 1 TABLET BY MOUTH ONCE DAILY Prescriptions Sent to Pharmacy Refills  
 dapagliflozin-metformin (XIGDUO XR) 5-1,000 mg TBph 1 Sig: Take 1 Tab by mouth daily. Class: Normal  
 Pharmacy: [facility]/pharmacy [address] [place] [address], [address] [place] [address],[address] [place] [address] Ph #: [phone] Route: Oral  
 metoprolol succinate (TOPROL-XL) 50 mg XL tablet 1 Sig: Take 1 Tab by mouth daily. Class: Normal  
 Pharmacy: [facility]/pharmacy [address] [place] [address], [address] [place] [address],[address] [facility] Ph #: [phone] Route: Oral  
 rosuvastatin (CRESTOR) 20 mg tablet 1 Sig: TAKE 1 TABLET BY MOUTH ONCE DAILY Class: Normal  
 Pharmacy: [facility]/pharmacy #[id number] [facility] [address] Ph #: [phone]  
 fenofibrate nanocrystallized (TRICOR) 48 mg tablet 1 Sig: TAKE 1 TABLET BY MOUTH DAILY  Class: Normal  
 Pharmacy: [facility]/pharmacy #[id number] [facility], [address] [place] [address],4Th Floor R [facility] [address] Ph #: [phone] Follow-up Instructions Return in about 6 months (around 2/14/2018) for diabetes, blood pressure, cholesterol. To-Do List   
 08/14/2017 Lab:  HEMOGLOBIN A1C WITH EAG Around 11/12/2017 Lab:  LIPID PANEL Around 11/12/2017 Lab:  METABOLIC PANEL, COMPREHENSIVE Patient Instructions Learning About Diabetes Food Guidelines Your Care Instructions Meal planning is important to manage diabetes. It helps keep your blood sugar at a target level (which you set with your doctor). You don't have to eat special foods. You can eat what your family eats, including sweets once in a while. But you do have to pay attention to how often you eat and how much you eat of certain foods. You may want to work with a dietitian or a certified diabetes educator (CDE) to help you plan meals and snacks. A dietitian or CDE can also help you lose weight if that is one of your goals. What should you know about eating carbs? Managing the amount of carbohydrate (carbs) you eat is an important part of healthy meals when you have diabetes. Carbohydrate is found in many foods. · Learn which foods have carbs. And learn the amounts of carbs in different foods. ¨ Bread, cereal, pasta, and rice have about 15 grams of carbs in a serving. A serving is 1 slice of bread (1 ounce), ½ cup of cooked cereal, or 1/3 cup of cooked pasta or rice. ¨ Fruits have 15 grams of carbs in a serving. A serving is 1 small fresh fruit, such as an apple or orange; ½ of a banana; ½ cup of cooked or canned fruit; ½ cup of fruit juice; 1 cup of melon or raspberries; or 2 tablespoons of dried fruit. ¨ Milk and no-sugar-added yogurt have 15 grams of carbs in a serving. A serving is 1 cup of milk or 2/3 cup of no-sugar-added yogurt. ¨ Starchy vegetables have 15 grams of carbs in a serving.  A serving is ½ cup of mashed potatoes or sweet potato; 1 cup winter squash; ½ of a small baked potato; ½ cup of cooked beans; or ½ cup cooked corn or green peas. · Learn how much carbs to eat each day and at each meal. A dietitian or CDE can teach you how to keep track of the amount of carbs you eat. This is called carbohydrate counting. · If you are not sure how to count carbohydrate grams, use the Plate Method to plan meals. It is a good, quick way to make sure that you have a balanced meal. It also helps you spread carbs throughout the day. ¨ Divide your plate by types of foods. Put non-starchy vegetables on half the plate, meat or other protein food on one-quarter of the plate, and a grain or starchy vegetable in the final quarter of the plate. To this you can add a small piece of fruit and 1 cup of milk or yogurt, depending on how many carbs you are supposed to eat at a meal. 
· Try to eat about the same amount of carbs at each meal. Do not \"save up\" your daily allowance of carbs to eat at one meal. 
· Proteins have very little or no carbs per serving. Examples of proteins are beef, chicken, turkey, fish, eggs, tofu, cheese, cottage cheese, and peanut butter. A serving size of meat is 3 ounces, which is about the size of a deck of cards. Examples of meat substitute serving sizes (equal to 1 ounce of meat) are 1/4 cup of cottage cheese, 1 egg, 1 tablespoon of peanut butter, and ½ cup of tofu. How can you eat out and still eat healthy? · Learn to estimate the serving sizes of foods that have carbohydrate. If you measure food at home, it will be easier to estimate the amount in a serving of restaurant food. · If the meal you order has too much carbohydrate (such as potatoes, corn, or baked beans), ask to have a low-carbohydrate food instead. Ask for a salad or green vegetables. · If you use insulin, check your blood sugar before and after eating out to help you plan how much to eat in the future.  
· If you eat more carbohydrate at a meal than you had planned, take a walk or do other exercise. This will help lower your blood sugar. What else should you know? · Limit saturated fat, such as the fat from meat and dairy products. This is a healthy choice because people who have diabetes are at higher risk of heart disease. So choose lean cuts of meat and nonfat or low-fat dairy products. Use olive or canola oil instead of butter or shortening when cooking. · Don't skip meals. Your blood sugar may drop too low if you skip meals and take insulin or certain medicines for diabetes. · Check with your doctor before you drink alcohol. Alcohol can cause your blood sugar to drop too low. Alcohol can also cause a bad reaction if you take certain diabetes medicines. Follow-up care is a key part of your treatment and safety. Be sure to make and go to all appointments, and call your doctor if you are having problems. It's also a good idea to know your test results and keep a list of the medicines you take. Where can you learn more? Go to http://rodney-jacky.info/. Enter G653 in the search box to learn more about \"Learning About Diabetes Food Guidelines. \" Current as of: March 13, 2017 Content Version: 11.3 © 1058-6033 [employer]. Care instructions adapted under license by [employer] (which disclaims liability or warranty for this information). If you have questions about a medical condition or this instruction, always ask your healthcare professional. [place] [address] any warranty or liability for your use of this information. Introducing [facility] & HEALTH SERVICES! [employer] introduces [employer] patient portal. Now you can access parts of your medical record, email your doctor's office, and request medication refills online. 1. In your internet browser, go to [email or web address] 2. Click on the First Time User? Click Here link in the Sign In box. You will see the New Member Sign Up page. 3. Enter your Connect HQ Access Code exactly as it appears below. You will not need to use this code after youve completed the sign-up process. If you do not sign up before the expiration date, you must request a new code. · Connect HQ Access Code: [email or web address] Expires: 9/5/2017 10:29 AM 
 
4. Enter the last four digits of your Social Security Number (xxxx) and Date of Birth (mm/dd/yyyy) as indicated and click Submit. You will be taken to the next sign-up page. 5. Create a Connect HQ ID. This will be your Connect HQ login ID and cannot be changed, so think of one that is secure and easy to remember. 6. Create a Connect HQ password. You can change your password at any time. 7. Enter your Password Reset Question and Answer. This can be used at a later time if you forget your password. 8. Enter your e-mail address. You will receive e-mail notification when new information is available in [address] 9. Click Sign Up. You can now view and download portions of your medical record. 10. Click the Download Summary menu link to download a portable copy of your medical information. If you have questions, please visit the Frequently Asked Questions section of the Connect HQ website. Remember, Connect HQ is NOT to be used for urgent needs. For medical emergencies, dial 911. Now available from your iPhone and Android! Please provide this summary of care documentation to your next provider. Your primary care clinician is listed as Ralph Cornelius. If you have any questions after today's visit, please call [phone].

## 2017-08-14 NOTE — PATIENT INSTRUCTIONS

## 2017-08-14 NOTE — PROGRESS NOTES
1. Have you been to the ER, urgent care clinic since your last visit? Hospitalized since your last visit? No    2. Have you seen or consulted any other health care providers outside of the [address] [place] [address] since your last visit? Include any pap smears or colon screening.  Yes - [facility]

## 2017-08-14 NOTE — PROGRESS NOTES
HISTORY OF PRESENT Jim Colunga is a 56y.o. year old male here today for:  DM, HTN, HLD    Doing well with above and needs refills and labs evaluated. No side effects. Current Outpatient Prescriptions   Medication Sig Dispense Refill    dapagliflozin-metformin (XIGDUO XR) 5-1,000 mg TBph Take 1 Tab by mouth daily. 90 Tab 1    metoprolol succinate (TOPROL-XL) 50 mg XL tablet Take 1 Tab by mouth daily. 90 Tab 1    rosuvastatin (CRESTOR) 20 mg tablet TAKE 1 TABLET BY MOUTH ONCE DAILY 90 Tab 1    fenofibrate nanocrystallized (TRICOR) 48 mg tablet TAKE 1 TABLET BY MOUTH DAILY 90 Tab 1    ONETOUCH ULTRA TEST strip TEST ONCE IN THE AM FASTING, THEN 2 HRS AFTER DINNER. DISP: 180 STRIPS. 180 Strip 2    Blood-Glucose Meter (ONE TOUCH ULTRAMINI) monitoring kit by Does Not Apply route. Test 1 am fasting then 2 hrs after dinner 1 Kit 0    Lancets (ONE TOUCH DELICA) Misc by Does Not Apply route. Test once in the AM fasting, then 2 hrs after dinner.   Disp:  A box for 180 1 Package 11    polyethylene glycol (MIRALAX) 17 gram/dose powder Take as directed by office 255 g 0     Past Medical History:   Diagnosis Date    CAD (coronary artery disease) 2011    coronary artery stent    Chest pain, unspecified     Abnormal nuclear scan    Diabetes ([address]     Hypercholesterolemia     Palpitations     Personal history of tobacco use, presenting hazards to health      Social History     Social History    Marital status:      Spouse name: N/A    Number of children: N/A    Years of education: N/A     Social History Main Topics    Smoking status: Former Smoker     Quit date: 12/7/2012    Smokeless tobacco: Never Used    Alcohol use 0.6 oz/week     1 Cans of beer, 0 Standard drinks or equivalent per week      Comment: social    Drug use: No    Sexual activity: Yes     Partners: Female     Other Topics Concern    None     Social History Narrative     Family History   Problem Relation Age of Onset    Cancer Mother     Cancer Maternal Uncle     Hypertension Father     Cancer Father      colon/rectal CA [de-identified] yo    Colon Cancer Father     Colon Polyps Father     Heart Disease Other      Positive family history of heart disease       ROS:  No numb, tingle, swell    Objective:  Visit Vitals    /84 (BP 1 Location: Right arm, BP Patient Position: Sitting)    Pulse 80    Temp 98.4 °F (36.9 °C) (Oral)    Resp 12    Ht 5' 10\" (1.778 m)    Wt 176 lb (79.8 kg)    SpO2 97%    BMI 25.25 kg/m2     GEN:  Appears stated age in NAD. HEENT: Conjunctiva/lids normal.  External ears and nose without lesions/trauma. Hearing Intact. Tongue midline. NECK: Trachea midline. Supple. Full ROM  CARDIAC:  regular rate and rhythm. no Murmur, no peripheral edema. LUNGS: lungs clear to auscultation, no accessory muscle use. MS: no clubbing/cyanosis. SKIN: Warm/dry without rash. PSYCH: Appropriate insight, Judgment. A&O x 3. Assessment/Plan:     ICD-10-CM ICD-9-CM    1. Type 2 diabetes mellitus without complication, without long-term current use of insulin (HCC) E11.9 250.00 dapagliflozin-metformin (XIGDUO XR) 5-1,000 mg TBph      METABOLIC PANEL, COMPREHENSIVE      HEMOGLOBIN A1C WITH EAG   2. Coronary artery disease involving native coronary artery of native heart without angina pectoris I25.10 414.01 metoprolol succinate (TOPROL-XL) 50 mg XL tablet      rosuvastatin (CRESTOR) 20 mg tablet   3. Hypercholesteremia E78.00 272.0 rosuvastatin (CRESTOR) 20 mg tablet      fenofibrate nanocrystallized (TRICOR) 48 mg tablet      METABOLIC PANEL, COMPREHENSIVE      LIPID PANEL     Patient verbalizes understanding of evaluation and plan. Will refill as above and await labs and I will notify results.

## 2017-10-06 DIAGNOSIS — I25.10 CORONARY ARTERY DISEASE INVOLVING NATIVE CORONARY ARTERY OF NATIVE HEART WITHOUT ANGINA PECTORIS: ICD-10-CM

## 2017-10-06 RX ORDER — CLOPIDOGREL BISULFATE 75 MG/1
TABLET ORAL
Qty: 90 TAB | Refills: 0 | Status: SHIPPED | OUTPATIENT
Start: 2017-10-06 | End: 2018-08-09 | Stop reason: SDUPTHER

## 2018-03-09 DIAGNOSIS — E78.00 HYPERCHOLESTEREMIA: ICD-10-CM

## 2018-03-12 RX ORDER — FENOFIBRATE 48 MG/1
TABLET, COATED ORAL
Qty: 90 TAB | Refills: 1 | OUTPATIENT
Start: 2018-03-12

## 2018-04-03 NOTE — TELEPHONE ENCOUNTER
Left non detailed message for return call re: medication request and S Philip's message      appt needed

## 2018-07-25 DIAGNOSIS — E78.00 HYPERCHOLESTEREMIA: ICD-10-CM

## 2018-07-25 NOTE — TELEPHONE ENCOUNTER
Requested Prescriptions     Pending Prescriptions Disp Refills    fenofibrate nanocrystallized (TRICOR) 48 mg tablet 90 Tab 1     Sig: TAKE 1 TABLET BY MOUTH DAILY        Pt has made a f/u appt with SONIDO Palacios for 08/09/2018

## 2018-07-26 RX ORDER — FENOFIBRATE 48 MG/1
TABLET, COATED ORAL
Qty: 15 TAB | Refills: 0 | Status: SHIPPED | OUTPATIENT
Start: 2018-07-26 | End: 2018-08-09 | Stop reason: SDUPTHER

## 2018-08-09 ENCOUNTER — HOSPITAL ENCOUNTER (OUTPATIENT)
Dept: LAB | Age: 58
Discharge: HOME | End: 2018-08-09
Payer: COMMERCIAL

## 2018-08-09 ENCOUNTER — OFFICE VISIT (OUTPATIENT)
Dept: FAMILY MEDICINE CLINIC | Age: 58
End: 2018-08-09

## 2018-08-09 VITALS
BODY MASS INDEX: 24.91 KG/M2 | WEIGHT: 174 LBS | SYSTOLIC BLOOD PRESSURE: 113 MMHG | RESPIRATION RATE: 16 BRPM | HEART RATE: 90 BPM | TEMPERATURE: 98.3 F | DIASTOLIC BLOOD PRESSURE: 76 MMHG | OXYGEN SATURATION: 96 % | HEIGHT: 70 IN

## 2018-08-09 DIAGNOSIS — I25.10 CORONARY ARTERY DISEASE INVOLVING NATIVE CORONARY ARTERY OF NATIVE HEART WITHOUT ANGINA PECTORIS: ICD-10-CM

## 2018-08-09 DIAGNOSIS — I10 ESSENTIAL HYPERTENSION: ICD-10-CM

## 2018-08-09 DIAGNOSIS — E78.00 HYPERCHOLESTEREMIA: ICD-10-CM

## 2018-08-09 DIAGNOSIS — Z11.59 ENCOUNTER FOR HEPATITIS C SCREENING TEST FOR LOW RISK PATIENT: ICD-10-CM

## 2018-08-09 DIAGNOSIS — E11.9 TYPE 2 DIABETES MELLITUS WITHOUT COMPLICATION, WITHOUT LONG-TERM CURRENT USE OF INSULIN (HCC): ICD-10-CM

## 2018-08-09 DIAGNOSIS — I10 ESSENTIAL HYPERTENSION: Primary | ICD-10-CM

## 2018-08-09 LAB
ALBUMIN SERPL-MCNC: 4.2 G/DL (ref 3.4–5)
ALBUMIN/GLOB SERPL: 1.4 {RATIO} (ref 0.8–1.7)
ALP SERPL-CCNC: 75 U/L (ref 45–117)
ALT SERPL-CCNC: 41 U/L (ref 16–61)
ANION GAP SERPL CALC-SCNC: 13 MMOL/L (ref 3–18)
AST SERPL-CCNC: 30 U/L (ref 15–37)
BILIRUB SERPL-MCNC: 0.3 MG/DL (ref 0.2–1)
BUN SERPL-MCNC: 16 MG/DL (ref 7–18)
BUN/CREAT SERPL: 17 (ref 12–20)
CALCIUM SERPL-MCNC: 9.3 MG/DL (ref 8.5–10.1)
CHLORIDE SERPL-SCNC: 105 MMOL/L (ref 100–108)
CHOLEST SERPL-MCNC: 148 MG/DL
CO2 SERPL-SCNC: 23 MMOL/L (ref 21–32)
CREAT SERPL-MCNC: 0.94 MG/DL (ref 0.6–1.3)
EST. AVERAGE GLUCOSE BLD GHB EST-MCNC: 151 MG/DL
GLOBULIN SER CALC-MCNC: 2.9 G/DL (ref 2–4)
GLUCOSE SERPL-MCNC: 172 MG/DL (ref 74–99)
HBA1C MFR BLD: 6.9 % (ref 4.2–5.6)
HDLC SERPL-MCNC: 43 MG/DL (ref 40–60)
HDLC SERPL: 3.4 {RATIO} (ref 0–5)
LDLC SERPL CALC-MCNC: ABNORMAL MG/DL (ref 0–100)
LIPID PROFILE,FLP: ABNORMAL
POTASSIUM SERPL-SCNC: 4.1 MMOL/L (ref 3.5–5.5)
PROT SERPL-MCNC: 7.1 G/DL (ref 6.4–8.2)
SODIUM SERPL-SCNC: 141 MMOL/L (ref 136–145)
TRIGL SERPL-MCNC: 573 MG/DL (ref ?–150)
VLDLC SERPL CALC-MCNC: ABNORMAL MG/DL

## 2018-08-09 PROCEDURE — 83036 HEMOGLOBIN GLYCOSYLATED A1C: CPT | Performed by: NURSE PRACTITIONER

## 2018-08-09 PROCEDURE — 82043 UR ALBUMIN QUANTITATIVE: CPT | Performed by: NURSE PRACTITIONER

## 2018-08-09 PROCEDURE — 80053 COMPREHEN METABOLIC PANEL: CPT | Performed by: NURSE PRACTITIONER

## 2018-08-09 PROCEDURE — 36415 COLL VENOUS BLD VENIPUNCTURE: CPT | Performed by: NURSE PRACTITIONER

## 2018-08-09 PROCEDURE — 87521 HEPATITIS C PROBE&RVRS TRNSC: CPT | Performed by: NURSE PRACTITIONER

## 2018-08-09 PROCEDURE — 80061 LIPID PANEL: CPT | Performed by: NURSE PRACTITIONER

## 2018-08-09 RX ORDER — FENOFIBRATE 48 MG/1
TABLET, COATED ORAL
Qty: 15 TAB | Refills: 0 | Status: SHIPPED | OUTPATIENT
Start: 2018-08-09 | End: 2018-08-21 | Stop reason: SDUPTHER

## 2018-08-09 RX ORDER — ROSUVASTATIN CALCIUM 20 MG/1
TABLET, COATED ORAL
Qty: 90 TAB | Refills: 1 | Status: SHIPPED | OUTPATIENT
Start: 2018-08-09 | End: 2019-02-11 | Stop reason: SDUPTHER

## 2018-08-09 RX ORDER — CLOPIDOGREL BISULFATE 75 MG/1
TABLET ORAL
Qty: 90 TAB | Refills: 0 | Status: SHIPPED | OUTPATIENT
Start: 2018-08-09 | End: 2018-11-04 | Stop reason: SDUPTHER

## 2018-08-09 RX ORDER — METOPROLOL SUCCINATE 50 MG/1
50 TABLET, EXTENDED RELEASE ORAL DAILY
Qty: 90 TAB | Refills: 1 | Status: SHIPPED | OUTPATIENT
Start: 2018-08-09 | End: 2019-01-31 | Stop reason: SDUPTHER

## 2018-08-09 NOTE — MR AVS SNAPSHOT
[address] [place] [address] 
 
 
 [address] [address] 
[phone] Patient: Kanika Cerda MRN: [id number]:[id number] Visit Information Date & Time Provider Department Dept. Phone Encounter #  
 8/9/2018  8:00 AM Gino Turner NP [facility] [phone] [id number] Upcoming Health Maintenance Date Due DTaP/Tdap/Td series (1 - Tdap) 6/19/1983 EYE EXAM RETINAL OR DILATED Q1 8/14/2015 COLONOSCOPY 6/7/2018 Influenza Age 5 to Adult 8/1/2018 Pneumococcal 19-64 Medium Risk (1 of 1 - PPSV23) 12/9/2018* HEMOGLOBIN A1C Q6M 2/9/2019 FOOT EXAM Q1 8/9/2019 MICROALBUMIN Q1 8/9/2019 LIPID PANEL Q1 8/9/2019 *Topic was postponed. The date shown is not the original due date. Allergies as of 8/9/2018  Review Complete On: 8/9/2018 By: Gino Turner NP No Known Allergies Current Immunizations  Never Reviewed No immunizations on file. Not reviewed this visit You Were Diagnosed With   
  
 Codes Comments Essential hypertension    -  Primary ICD-10-CM: I10 
ICD-9-CM: 401.9 Hypercholesteremia     ICD-10-CM: E78.00 ICD-9-CM: 272.0 Coronary artery disease involving native coronary artery of native heart without angina pectoris     ICD-10-CM: I25.10 ICD-9-CM: 414.01 Type 2 diabetes mellitus without complication, without long-term current use of insulin (HCC)     ICD-10-CM: E11.9 ICD-9-CM: 250.00 Encounter for hepatitis C screening test for low risk patient     ICD-10-CM: Z11.59 
ICD-9-CM: V73.89 Vitals BP Pulse Temp Resp Height(growth percentile) Weight(growth percentile) 113/76 (BP 1 Location: Left arm, BP Patient Position: Sitting) 90 98.3 °F (36.8 °C) (Oral) 16 5' 10\" (1.778 m) 174 lb (78.9 kg) SpO2 BMI Smoking Status 96% 24.97 kg/m2 Former Smoker Vitals History BMI and BSA Data Body Mass Index Body Surface Area  24.97 kg/m 2 1.97 m 2  
 Preferred Pharmacy Pharmacy Name Phone [facility]ARMACY #[id number] [place], [address] [facility],4[address] [facility] [phone] Your Updated Medication List  
  
   
This list is accurate as of 8/9/18  8:23 AM.  Always use your most recent med list.  
  
  
  
  
 Blood-Glucose Meter monitoring kit Commonly known as:  [place] Mcgraw  
by Does Not Apply route. Test 1 am fasting then 2 hrs after dinner  
  
 clopidogrel 75 mg Tab Commonly known as:  PLAVIX TAKE 1 TAB BY MOUTH DAILY. dapagliflozin-metformin 5-1,000 mg Tbph  
Commonly known as:  XIGDUO XR Take 1 Tab by mouth daily. fenofibrate nanocrystallized 48 mg tablet Commonly known as:  TRICOR  
TAKE 1 TABLET BY MOUTH DAILY Lancets Misc Commonly known as: One Touch Delica  
by Does Not Apply route. Test once in the AM fasting, then 2 hrs after dinner. Disp:  A box for 180  
  
 metoprolol succinate 50 mg XL tablet Commonly known as:  TOPROL-XL Take 1 Tab by mouth daily. ONETOUCH ULTRA TEST strip Generic drug:  glucose blood VI test strips TEST ONCE IN THE AM FASTING, THEN 2 HRS AFTER DINNER. DISP: 180 STRIPS. polyethylene glycol 17 gram/dose powder Commonly known as:  Duane Hewitt Take as directed by office  
  
 rosuvastatin 20 mg tablet Commonly known as:  CRESTOR  
TAKE 1 TABLET BY MOUTH ONCE DAILY Prescriptions Sent to Pharmacy Refills  
 fenofibrate nanocrystallized (TRICOR) 48 mg tablet 0 Sig: TAKE 1 TABLET BY MOUTH DAILY Class: Normal  
 Pharmacy: [facility]/pharmacy #[id number] [address] Ph #: [phone]  
 clopidogrel (PLAVIX) 75 mg tab 0 Sig: TAKE 1 TAB BY MOUTH DAILY. Class: Normal  
 Pharmacy: [facility]/pharmacy #[id number] [address] Ph #: [phone]  
 dapagliflozin-metformin (XIGDUO XR) 5-1,000 mg TBph 1 Sig: Take 1 Tab by mouth daily.   
 Class: Normal  
 Pharmacy: [facility]/pharmacy [address] [place] [address], [address] [place] [address],[address] [facility] Ph #: [phone] Route: Oral  
 metoprolol succinate (TOPROL-XL) 50 mg XL tablet 1 Sig: Take 1 Tab by mouth daily. Class: Normal  
 Pharmacy: [facility]/pharmacy [address] [place] [address], [facility],4[address] [facility] Ph #: [phone] Route: Oral  
 rosuvastatin (CRESTOR) 20 mg tablet 1 Sig: TAKE 1 TABLET BY MOUTH ONCE DAILY Class: Normal  
 [facility] [address] [place] [address], [address] [place] [address],[address] [facility] Ph #: [phone] To-Do List   
 08/09/2018 Lab:  HCV RNA JAMIE QUALITATIVE   
  
 08/09/2018 Lab:  HEMOGLOBIN A1C WITH EAG   
  
 08/09/2018 Lab:  LIPID PANEL   
  
 08/09/2018 Lab:  METABOLIC PANEL, COMPREHENSIVE   
  
 08/09/2018 Lab:  MICROALBUMIN, UR, RAND W/ MICROALB/CREAT RATIO Introducing [facility] & HEALTH SERVICES! Bryce Byrnes introduces [employer] patient portal. Now you can access parts of your medical record, email your doctor's office, and request medication refills online. 1. In your internet browser, go to [email or web address] 2. Click on the First Time User? Click Here link in the Sign In box. You will see the New Member Sign Up page. 3. Enter your [employer] Access Code exactly as it appears below. You will not need to use this code after youve completed the sign-up process. If you do not sign up before the expiration date, you must request a new code. · [employer] Access Code: [email or web address] Expires: 11/7/2018  8:10 AM 
 
4. Enter the last four digits of your Social Security Number (xxxx) and Date of Birth (mm/dd/yyyy) as indicated and click Submit. You will be taken to the next sign-up page. 5. Create a EBIQUOUSt ID. This will be your [employer] login ID and cannot be changed, so think of one that is secure and easy to remember. 6. Create a EBIQUOUSt password. You can change your password at any time. 7. Enter your Password Reset Question and Answer.  This can be used at a later time if you forget your password. 8. Enter your e-mail address. You will receive e-mail notification when new information is available in [address] 9. Click Sign Up. You can now view and download portions of your medical record. 10. Click the Download Summary menu link to download a portable copy of your medical information. If you have questions, please visit the Frequently Asked Questions section of the [employer] website. Remember, [employer] is NOT to be used for urgent needs. For medical emergencies, dial 911. Now available from your iPhone and Android! Please provide this summary of care documentation to your next provider. Your primary care clinician is listed as Lizzeth Godinez. If you have any questions after today's visit, please call [phone].

## 2018-08-09 NOTE — PROGRESS NOTES
HISTORY OF PRESENT ILLNESS  Wm Rangel is a 66 y.o. male. Patient presents for chronic care follow up. HPI  Pt needs refills. Pt gets his eyes examined yearly  Pt has had several colonoscopy's      Review of Systems   Constitutional: Negative. Eyes: Negative. Respiratory: Negative. Cardiovascular: Negative. Gastrointestinal: Negative. Genitourinary: Negative. Visit Vitals    /76 (BP 1 Location: Left arm, BP Patient Position: Sitting)    Pulse 90    Temp 98.3 °F (36.8 °C) (Oral)    Resp 16    Ht 5' 10\" (1.778 m)    Wt 174 lb (78.9 kg)    SpO2 96%    BMI 24.97 kg/m2       Physical Exam   Constitutional: He is oriented to person, place, and time. He appears well-developed. No distress. Eyes: Conjunctivae and EOM are normal. Pupils are equal, round, and reactive to light. Cardiovascular: Normal rate and normal heart sounds. Exam reveals no gallop and no friction rub. No murmur heard. Pulmonary/Chest: Effort normal and breath sounds normal. No respiratory distress. He has no wheezes. He has no rales. Musculoskeletal: Normal range of motion. He exhibits no edema. Neurological: He is alert and oriented to person, place, and time. No cranial nerve deficit. Skin: Skin is warm. No rash noted. No erythema. Diabetic foot exam:     Left Foot:   Visual Exam: normal    Pulse DP: 2+ (normal)   Filament test: normal sensation    Vibratory sensation: normal      Right Foot:   Visual Exam: normal    Pulse DP: 2+ (normal)   Filament test: normal sensation    Vibratory sensation: normal      ASSESSMENT and PLAN    ICD-10-CM ICD-9-CM    1. Essential hypertension I10 401.9 metoprolol succinate (TOPROL-XL) 50 mg XL tablet      METABOLIC PANEL, COMPREHENSIVE   2. Hypercholesteremia E78.00 272.0 fenofibrate nanocrystallized (TRICOR) 48 mg tablet      rosuvastatin (CRESTOR) 20 mg tablet      LIPID PANEL   3.  Coronary artery disease involving native coronary artery of native heart without angina pectoris I25.10 414.01 clopidogrel (PLAVIX) 75 mg tab   4. Type 2 diabetes mellitus without complication, without long-term current use of insulin (HCC) E11.9 250.00 dapagliflozin-metformin (XIGDUO XR) 5-1,000 mg TBph      METABOLIC PANEL, COMPREHENSIVE      HEMOGLOBIN A1C WITH EAG      MICROALBUMIN, UR, RAND W/ MICROALB/CREAT RATIO   5. Encounter for hepatitis C screening test for low risk patient Z11.59 V73.89 HCV RNA JAMIE QUALITATIVE     PLAN:  Pt given ACP to review  Pt had colonoscopy on 6-9-18 and repeat is due in 5 years. Pt was asked to have the opthalmology to send notes regarding eye exam.    Pt was asked to RTC in 6 months for chronic care    Pt given after visit summary.

## 2018-08-09 NOTE — PROGRESS NOTES
1. Have you been to the ER, urgent care clinic since your last visit? Hospitalized since your last visit? No    2. Have you seen or consulted any other health care providers outside of the [address] [place] [address] since your last visit? Include any pap smears or colon screening.  No

## 2018-08-09 NOTE — ACP (ADVANCE CARE PLANNING)
Advance Care Planning (ACP) Provider Conversation Snapshot    Date of ACP Conversation: 08/09/18  Persons included in Conversation:  patient  Length of ACP Conversation in minutes:  <16 minutes (Non-Billable)    Authorized Decision Maker (if patient is incapable of making informed decisions): This person is:   Healthcare Agent/Medical Power of  under Advance Directive          For Patients with Decision Making Capacity:   Values/Goals: Exploration of values, goals, and preferences if recovery is not expected, even with continued medical treatment in the event of:  Imminent death  Severe, permanent brain injury  \"In these circumstances, what matters most to you? \"  Pt will discuss with family    Conversation Outcomes / Follow-Up Plan:   Recommended completion of Advance Directive form after review of ACP materials and conversation with prospective healthcare agent

## 2018-08-10 LAB
CREAT UR-MCNC: 156.03 MG/DL (ref 30–125)
HCV RNA SERPL QL NAA+PROBE: NOT DETECTED
MICROALBUMIN UR-MCNC: 0.4 MG/DL (ref 0–3)
MICROALBUMIN/CREAT UR-RTO: <3 MG/G (ref 0–30)

## 2018-08-10 NOTE — PROGRESS NOTES
Please advise Pt that his triglycerides are too high at 573. I would like him to eat more foods rich in Omega 3 and Niacin, eat less foods that are fried and fatty, walk  His liver functions are fine. His glucose 172 and his HgbA1C is 6.9 this is lower that his last HgbA1C of 7.3  His microalbumin is slightly off, this is one way to look at his kidney functions. Keeping his glucose numbers tight will help improve this number. This only needs to be looked at once a year.

## 2018-11-04 DIAGNOSIS — I25.10 CORONARY ARTERY DISEASE INVOLVING NATIVE CORONARY ARTERY OF NATIVE HEART WITHOUT ANGINA PECTORIS: ICD-10-CM

## 2018-11-05 RX ORDER — CLOPIDOGREL BISULFATE 75 MG/1
TABLET ORAL
Qty: 90 TAB | Refills: 0 | Status: SHIPPED | OUTPATIENT
Start: 2018-11-05 | End: 2019-02-01 | Stop reason: SDUPTHER

## 2019-02-01 DIAGNOSIS — I25.10 CORONARY ARTERY DISEASE INVOLVING NATIVE CORONARY ARTERY OF NATIVE HEART WITHOUT ANGINA PECTORIS: ICD-10-CM

## 2019-02-01 RX ORDER — CLOPIDOGREL BISULFATE 75 MG/1
TABLET ORAL
Qty: 90 TAB | Refills: 0 | Status: SHIPPED | OUTPATIENT
Start: 2019-02-01 | End: 2019-04-26 | Stop reason: SDUPTHER

## 2019-02-06 NOTE — MR AVS SNAPSHOT
[address] [place] [id number]    [facility]      2/6/2019    This is to certify that Mustapha Ortiz had an appointment at this office for professional attention on February 6, 2019.    He was seen for anxiety and stress. He is currently undergoing treatment for the current condition.       Thank you,      Domenic Robin MD  [facility]  [address] [place] [id number] [phone]   Visit Information Date & Time Provider Department Dept. Phone Encounter #  
 1/31/2017  3:10 PM Tiarra Ni LPN [employer] [phone] [id number] Upcoming Health Maintenance Date Due Hepatitis C Screening 1960 Pneumococcal 19-64 Medium Risk (1 of 1 - PPSV23) 6/19/1981 DTaP/Tdap/Td series (1 - Tdap) 6/19/1983 EYE EXAM RETINAL OR DILATED Q1 8/14/2015 INFLUENZA AGE 9 TO ADULT 8/1/2016 COLONOSCOPY 12/16/2016 HEMOGLOBIN A1C Q6M 2/8/2017 FOOT EXAM Q1 4/1/2017 MICROALBUMIN Q1 4/1/2017 LIPID PANEL Q1 8/8/2017 Allergies as of 1/31/2017  Review Complete On: 1/31/2017 By: Sanjuanita Lakhani. CASTILLO Ni No Known Allergies Current Immunizations  Never Reviewed No immunizations on file. Not reviewed this visit Vitals Smoking Status Former Smoker Preferred Pharmacy Pharmacy Name Phone [id number] [address], [facility] [address] [facility] [facility] [phone] Your Updated Medication List  
  
   
This list is accurate as of: 1/31/17  3:24 PM.  Always use your most recent med list.  
  
  
  
  
 aspirin 81 mg chewable tablet Take 81 mg by mouth daily. Blood-Glucose Meter monitoring kit Commonly known as:  Everett Melo  
by Does Not Apply route. Test 1 am fasting then 2 hrs after dinner  
  
 clopidogrel 75 mg Tab Commonly known as:  PLAVIX Take  by mouth. dapagliflozin-metformin 5-1,000 mg Tbph  
Commonly known as:  XIGDUO XR Take 1 Tab by mouth daily. fenofibrate nanocrystallized 48 mg tablet Commonly known as:  TRICOR  
TAKE 1 TABLET BY MOUTH DAILY Lancets Misc Commonly known as: One Touch Delica  
by Does Not Apply route. Test once in the AM fasting, then 2 hrs after dinner. Disp:  A box for 180  
  
 metoprolol succinate 50 mg XL tablet Commonly known as:  TOPROL-XL Take 1 Tab by mouth daily. ONETOUCH ULTRA TEST strip Generic drug:  glucose blood VI test strips TEST ONCE IN THE AM FASTING, THEN 2 HRS AFTER DINNER. DISP: 180 STRIPS. rosuvastatin 20 mg tablet Commonly known as:  CRESTOR  
TAKE 1 TABLET BY MOUTH ONCE DAILY Introducing \[facility]\"" & HEALTH SERVICES! [address] introduces [employer] patient portal. Now you can access parts of your medical record, email your doctor's office, and request medication refills online. 1. In your internet browser, go to [email or web address] 2. Click on the First Time User? Click Here link in the Sign In box. You will see the New Member Sign Up page. 3. Enter your [employer] [facility] Access Code exactly as it appears below. You will not need to use this code after youve completed the sign-up process. If you do not sign up before the expiration date, you must request a new code. · [employer] [facility] Access Code: [email or web address] Expires: 3/13/2017  5:28 PM 
 
4. Enter the last four digits of your Social Security Number (xxxx) and Date of Birth (mm/dd/yyyy) as indicated and click Submit. You will be taken to the next sign-up page. 5. Create a [facility] [facility] ID. This will be your [facility] [facility] login ID and cannot be changed, so think of one that is secure and easy to remember. 6. Create a [facility] [facility] password. You can change your password at any time. 7. Enter your Password Reset Question and Answer. This can be used at a later time if you forget your password. 8. Enter your e-mail address. You will receive e-mail notification when new information is available in [address] 9. Click Sign Up. You can now view and download portions of your medical record. 10. Click the Download Summary menu link to download a portable copy of your medical information. If you have questions, please visit the Frequently Asked Questions section of the [employer] website. Remember, [facility] [facility] is NOT to be used for urgent needs. For medical emergencies, dial 911. Now available from your iPhone and Android! Please provide this summary of care documentation to your next provider. Your primary care clinician is listed as Sally Wayne. If you have any questions after today's visit, please call [phone].

## 2019-02-11 ENCOUNTER — HOSPITAL ENCOUNTER (OUTPATIENT)
Dept: LAB | Age: 59
Discharge: HOME | End: 2019-02-11
Payer: COMMERCIAL

## 2019-02-11 ENCOUNTER — OFFICE VISIT (OUTPATIENT)
Dept: FAMILY MEDICINE CLINIC | Age: 59
End: 2019-02-11

## 2019-02-11 VITALS
HEART RATE: 81 BPM | TEMPERATURE: 98 F | RESPIRATION RATE: 17 BRPM | BODY MASS INDEX: 24.77 KG/M2 | DIASTOLIC BLOOD PRESSURE: 77 MMHG | SYSTOLIC BLOOD PRESSURE: 125 MMHG | OXYGEN SATURATION: 97 % | WEIGHT: 173 LBS | HEIGHT: 70 IN

## 2019-02-11 DIAGNOSIS — I10 ESSENTIAL HYPERTENSION: ICD-10-CM

## 2019-02-11 DIAGNOSIS — Z12.5 SCREENING FOR MALIGNANT NEOPLASM OF PROSTATE: ICD-10-CM

## 2019-02-11 DIAGNOSIS — E78.2 MIXED HYPERLIPIDEMIA: ICD-10-CM

## 2019-02-11 DIAGNOSIS — E11.9 TYPE 2 DIABETES MELLITUS WITHOUT COMPLICATION, WITHOUT LONG-TERM CURRENT USE OF INSULIN (HCC): ICD-10-CM

## 2019-02-11 DIAGNOSIS — I10 ESSENTIAL HYPERTENSION: Primary | ICD-10-CM

## 2019-02-11 PROBLEM — E87.8 HYPERCHLOREMIA: Status: ACTIVE | Noted: 2019-02-11

## 2019-02-11 LAB
ALBUMIN SERPL-MCNC: 4.2 G/DL (ref 3.4–5)
ALBUMIN/GLOB SERPL: 1.3 {RATIO} (ref 0.8–1.7)
ALP SERPL-CCNC: 70 U/L (ref 45–117)
ALT SERPL-CCNC: 35 U/L (ref 16–61)
ANION GAP SERPL CALC-SCNC: 10 MMOL/L (ref 3–18)
AST SERPL-CCNC: 20 U/L (ref 15–37)
BILIRUB SERPL-MCNC: 0.4 MG/DL (ref 0.2–1)
BUN SERPL-MCNC: 16 MG/DL (ref 7–18)
BUN/CREAT SERPL: 17 (ref 12–20)
CALCIUM SERPL-MCNC: 9.4 MG/DL (ref 8.5–10.1)
CHLORIDE SERPL-SCNC: 102 MMOL/L (ref 100–108)
CHOLEST SERPL-MCNC: 270 MG/DL
CO2 SERPL-SCNC: 26 MMOL/L (ref 21–32)
CREAT SERPL-MCNC: 0.96 MG/DL (ref 0.6–1.3)
EST. AVERAGE GLUCOSE BLD GHB EST-MCNC: 160 MG/DL
GLOBULIN SER CALC-MCNC: 3.2 G/DL (ref 2–4)
GLUCOSE SERPL-MCNC: 178 MG/DL (ref 74–99)
HBA1C MFR BLD: 7.2 % (ref 4.2–5.6)
HDLC SERPL-MCNC: 48 MG/DL (ref 40–60)
HDLC SERPL: 5.6 {RATIO} (ref 0–5)
LDLC SERPL CALC-MCNC: 144 MG/DL (ref 0–100)
LIPID PROFILE,FLP: ABNORMAL
POTASSIUM SERPL-SCNC: 4.3 MMOL/L (ref 3.5–5.5)
PROT SERPL-MCNC: 7.4 G/DL (ref 6.4–8.2)
PSA SERPL-MCNC: 0.4 NG/ML (ref 0–4)
SODIUM SERPL-SCNC: 138 MMOL/L (ref 136–145)
TRIGL SERPL-MCNC: 390 MG/DL (ref ?–150)
VLDLC SERPL CALC-MCNC: 78 MG/DL

## 2019-02-11 PROCEDURE — 80053 COMPREHEN METABOLIC PANEL: CPT

## 2019-02-11 PROCEDURE — 84153 ASSAY OF PSA TOTAL: CPT

## 2019-02-11 PROCEDURE — 80061 LIPID PANEL: CPT

## 2019-02-11 PROCEDURE — 83036 HEMOGLOBIN GLYCOSYLATED A1C: CPT

## 2019-02-11 PROCEDURE — 36415 COLL VENOUS BLD VENIPUNCTURE: CPT

## 2019-02-11 RX ORDER — FENOFIBRATE 48 MG/1
48 TABLET, COATED ORAL DAILY
Qty: 90 TAB | Refills: 1 | Status: SHIPPED | OUTPATIENT
Start: 2019-02-11 | End: 2019-10-16 | Stop reason: SDUPTHER

## 2019-02-11 RX ORDER — ROSUVASTATIN CALCIUM 20 MG/1
TABLET, COATED ORAL
Qty: 90 TAB | Refills: 1 | Status: SHIPPED | OUTPATIENT
Start: 2019-02-11 | End: 2019-11-07 | Stop reason: SDUPTHER

## 2019-02-11 RX ORDER — METOPROLOL SUCCINATE 50 MG/1
50 TABLET, EXTENDED RELEASE ORAL DAILY
Qty: 90 TAB | Refills: 1 | Status: SHIPPED | OUTPATIENT
Start: 2019-02-11 | End: 2019-08-29 | Stop reason: SDUPTHER

## 2019-02-11 NOTE — PROGRESS NOTES
Subjective:  
 
Shirley Schmid is a 66 y.o. male presenting for annual exam and complete physical. 
 
{Choose one or more SmartLinks; press DELETE if none desired:7671180} {Choose one or more Last Lab values; press DELETE if none desired:0842015} Review of Systems 
{ros - complete:24028} Objective:  
 
Visit Vitals /77 (BP 1 Location: Left arm, BP Patient Position: Sitting) Pulse 81 Temp 98 °F (36.7 °C) (Oral) Resp 17 Ht 5' 10\" (1.778 m) Wt 173 lb (78.5 kg) SpO2 97% BMI 24.82 kg/m² Physical exam:  
{male adult master:147863} Assessment/Plan:  
 
*** 
{disease follow up plans:598242}. {Assessment and Plan:98600}.

## 2019-02-11 NOTE — PROGRESS NOTES
HISTORY OF PRESENT ILLNESS  Jamaal Garrido is a 66 y.o. male. Patient presents for chronic care    HPI  Pt has a form that he needs filled out for work. Pt stats he is doing well. He has no complaints. Review of Systems   Constitutional: Negative. Eyes: Negative. Respiratory: Negative. Cardiovascular: Negative. Gastrointestinal: Negative. Genitourinary: Negative. Visit Vitals  /77 (BP 1 Location: Left arm, BP Patient Position: Sitting)   Pulse 81   Temp 98 °F (36.7 °C) (Oral)   Resp 17   Ht 5' 10\" (1.778 m)   Wt 173 lb (78.5 kg)   SpO2 97%   BMI 24.82 kg/m²       Physical Exam   Constitutional: He is oriented to person, place, and time. He appears well-developed. No distress. HENT:   Right Ear: Tympanic membrane normal.   Left Ear: Tympanic membrane normal.   Mouth/Throat: Uvula is midline and oropharynx is clear and moist.   Cardiovascular: Normal rate, regular rhythm and normal heart sounds. No murmur heard. Pulmonary/Chest: Effort normal and breath sounds normal. No respiratory distress. He has no wheezes. He has no rales. Musculoskeletal: He exhibits no edema. Neurological: He is alert and oriented to person, place, and time. No cranial nerve deficit. ASSESSMENT and PLAN    ICD-10-CM ICD-9-CM    1. Essential hypertension I10 401.9 metoprolol succinate (TOPROL-XL) 50 mg XL tablet      METABOLIC PANEL, COMPREHENSIVE   2. Mixed hyperlipidemia E78.2 272.2 fenofibrate nanocrystallized (TRICOR) 48 mg tablet      rosuvastatin (CRESTOR) 20 mg tablet      LIPID PANEL   3. Type 2 diabetes mellitus without complication, without long-term current use of insulin (HCC) E11.9 250.00 HEMOGLOBIN A1C WITH EAG   4. Screening for malignant neoplasm of prostate Z12.5 V76.44 PSA SCREENING (SCREENING)     PLAN:  Form will be completed once lab results are back. I have discussed the diagnosis with the patient and the intended plan as seen in the above orders.   The patient has received an after-visit summary and questions were answered concerning future plans. I have discussed medication side effects and warnings with the patient as well. Patient will call for further questions. Follow-up Disposition:  Return in about 6 months (around 8/11/2019) for chronic care.

## 2019-02-11 NOTE — PROGRESS NOTES
Chief Complaint   Patient presents with    Well Male   1. Have you been to the ER, urgent care clinic since your last visit? Hospitalized since your last visit? No    2. Have you seen or consulted any other health care providers outside of the [address] [place] [address] since your last visit? Include any pap smears or colon screening.  No

## 2019-02-19 ENCOUNTER — TELEPHONE (OUTPATIENT)
Dept: FAMILY MEDICINE CLINIC | Age: 59
End: 2019-02-19

## 2019-02-19 NOTE — TELEPHONE ENCOUNTER
Left message for patient to call back office. Paperwork complete and faxed.  He can  a copy if he wants

## 2019-02-19 NOTE — TELEPHONE ENCOUNTER
Patient wife calling to check the status of the Wellness Checklist form for patients insurance.     pls advise

## 2019-04-26 DIAGNOSIS — I25.10 CORONARY ARTERY DISEASE INVOLVING NATIVE CORONARY ARTERY OF NATIVE HEART WITHOUT ANGINA PECTORIS: ICD-10-CM

## 2019-04-26 RX ORDER — CLOPIDOGREL BISULFATE 75 MG/1
TABLET ORAL
Qty: 90 TAB | Refills: 0 | Status: SHIPPED | OUTPATIENT
Start: 2019-04-26 | End: 2019-08-02 | Stop reason: SDUPTHER

## 2019-08-02 DIAGNOSIS — I25.10 CORONARY ARTERY DISEASE INVOLVING NATIVE CORONARY ARTERY OF NATIVE HEART WITHOUT ANGINA PECTORIS: ICD-10-CM

## 2019-08-02 RX ORDER — CLOPIDOGREL BISULFATE 75 MG/1
TABLET ORAL
Qty: 90 TAB | Refills: 0 | Status: SHIPPED | OUTPATIENT
Start: 2019-08-02 | End: 2019-11-07 | Stop reason: SDUPTHER

## 2019-10-05 DIAGNOSIS — E11.9 TYPE 2 DIABETES MELLITUS WITHOUT COMPLICATION, WITHOUT LONG-TERM CURRENT USE OF INSULIN (HCC): ICD-10-CM

## 2019-10-05 RX ORDER — DAPAGLIFLOZIN AND METFORMIN HYDROCHLORIDE 5; 1000 MG/1; MG/1
TABLET, FILM COATED, EXTENDED RELEASE ORAL
Qty: 30 TAB | Refills: 0 | OUTPATIENT
Start: 2019-10-05

## 2019-10-22 DIAGNOSIS — E78.2 MIXED HYPERLIPIDEMIA: ICD-10-CM

## 2019-10-22 DIAGNOSIS — I25.10 CORONARY ARTERY DISEASE INVOLVING NATIVE CORONARY ARTERY OF NATIVE HEART WITHOUT ANGINA PECTORIS: ICD-10-CM

## 2019-10-22 DIAGNOSIS — E11.9 TYPE 2 DIABETES MELLITUS WITHOUT COMPLICATION, WITHOUT LONG-TERM CURRENT USE OF INSULIN (HCC): ICD-10-CM

## 2019-10-22 RX ORDER — DAPAGLIFLOZIN AND METFORMIN HYDROCHLORIDE 5; 1000 MG/1; MG/1
TABLET, FILM COATED, EXTENDED RELEASE ORAL
Qty: 30 TAB | Refills: 0 | OUTPATIENT
Start: 2019-10-22

## 2019-10-22 RX ORDER — CLOPIDOGREL BISULFATE 75 MG/1
TABLET ORAL
Qty: 90 TAB | Refills: 0 | OUTPATIENT
Start: 2019-10-22

## 2019-10-22 RX ORDER — ROSUVASTATIN CALCIUM 20 MG/1
TABLET, COATED ORAL
Qty: 90 TAB | Refills: 1 | OUTPATIENT
Start: 2019-10-22

## 2020-01-08 ENCOUNTER — OFFICE VISIT (OUTPATIENT)
Dept: FAMILY MEDICINE CLINIC | Age: 60
End: 2020-01-08

## 2020-01-08 ENCOUNTER — HOSPITAL ENCOUNTER (OUTPATIENT)
Dept: LAB | Age: 60
Discharge: HOME | End: 2020-01-08
Payer: COMMERCIAL

## 2020-01-08 VITALS
OXYGEN SATURATION: 99 % | RESPIRATION RATE: 16 BRPM | DIASTOLIC BLOOD PRESSURE: 78 MMHG | SYSTOLIC BLOOD PRESSURE: 116 MMHG | WEIGHT: 170.8 LBS | TEMPERATURE: 98.7 F | BODY MASS INDEX: 24.45 KG/M2 | HEART RATE: 76 BPM | HEIGHT: 70 IN

## 2020-01-08 DIAGNOSIS — I10 ESSENTIAL HYPERTENSION: ICD-10-CM

## 2020-01-08 DIAGNOSIS — E11.9 TYPE 2 DIABETES MELLITUS WITHOUT COMPLICATION, WITHOUT LONG-TERM CURRENT USE OF INSULIN (HCC): Primary | ICD-10-CM

## 2020-01-08 DIAGNOSIS — E11.9 TYPE 2 DIABETES MELLITUS WITHOUT COMPLICATION, WITHOUT LONG-TERM CURRENT USE OF INSULIN (HCC): ICD-10-CM

## 2020-01-08 DIAGNOSIS — E78.2 MIXED HYPERLIPIDEMIA: ICD-10-CM

## 2020-01-08 DIAGNOSIS — Z23 ENCOUNTER FOR IMMUNIZATION: ICD-10-CM

## 2020-01-08 LAB
ALBUMIN SERPL-MCNC: 4.1 G/DL (ref 3.4–5)
ALBUMIN/GLOB SERPL: 1.3 {RATIO} (ref 0.8–1.7)
ALP SERPL-CCNC: 57 U/L (ref 45–117)
ALT SERPL-CCNC: 42 U/L (ref 16–61)
ANION GAP SERPL CALC-SCNC: 8 MMOL/L (ref 3–18)
AST SERPL-CCNC: 28 U/L (ref 10–38)
BILIRUB SERPL-MCNC: 1.2 MG/DL (ref 0.2–1)
BUN SERPL-MCNC: 20 MG/DL (ref 7–18)
BUN/CREAT SERPL: 20 (ref 12–20)
CALCIUM SERPL-MCNC: 9 MG/DL (ref 8.5–10.1)
CHLORIDE SERPL-SCNC: 101 MMOL/L (ref 100–111)
CHOLEST SERPL-MCNC: 162 MG/DL
CO2 SERPL-SCNC: 27 MMOL/L (ref 21–32)
CREAT SERPL-MCNC: 1.01 MG/DL (ref 0.6–1.3)
CREAT UR-MCNC: 142 MG/DL (ref 30–125)
EST. AVERAGE GLUCOSE BLD GHB EST-MCNC: 146 MG/DL
GLOBULIN SER CALC-MCNC: 3.1 G/DL (ref 2–4)
GLUCOSE SERPL-MCNC: 145 MG/DL (ref 74–99)
HBA1C MFR BLD: 6.7 % (ref 4.2–5.6)
HDLC SERPL-MCNC: 51 MG/DL (ref 40–60)
HDLC SERPL: 3.2 {RATIO} (ref 0–5)
LDLC SERPL CALC-MCNC: 70.2 MG/DL (ref 0–100)
LIPID PROFILE,FLP: ABNORMAL
MICROALBUMIN UR-MCNC: 0.58 MG/DL (ref 0–3)
MICROALBUMIN/CREAT UR-RTO: 4 MG/G (ref 0–30)
POTASSIUM SERPL-SCNC: 4 MMOL/L (ref 3.5–5.5)
PROT SERPL-MCNC: 7.2 G/DL (ref 6.4–8.2)
SODIUM SERPL-SCNC: 136 MMOL/L (ref 136–145)
TRIGL SERPL-MCNC: 204 MG/DL (ref ?–150)
VLDLC SERPL CALC-MCNC: 40.8 MG/DL

## 2020-01-08 PROCEDURE — 80053 COMPREHEN METABOLIC PANEL: CPT

## 2020-01-08 PROCEDURE — 36415 COLL VENOUS BLD VENIPUNCTURE: CPT

## 2020-01-08 PROCEDURE — 83036 HEMOGLOBIN GLYCOSYLATED A1C: CPT

## 2020-01-08 PROCEDURE — 80061 LIPID PANEL: CPT

## 2020-01-08 PROCEDURE — 82043 UR ALBUMIN QUANTITATIVE: CPT

## 2020-01-08 RX ORDER — ROSUVASTATIN CALCIUM 20 MG/1
TABLET, COATED ORAL
Qty: 90 TAB | Refills: 1 | Status: SHIPPED | OUTPATIENT
Start: 2020-01-08 | End: 2020-07-12

## 2020-01-08 RX ORDER — ASPIRIN 81 MG/1
TABLET ORAL DAILY
COMMUNITY
End: 2021-09-28

## 2020-01-08 RX ORDER — METOPROLOL SUCCINATE 50 MG/1
TABLET, EXTENDED RELEASE ORAL
Qty: 90 TAB | Refills: 1 | Status: SHIPPED | OUTPATIENT
Start: 2020-01-08 | End: 2020-07-12

## 2020-01-08 RX ORDER — FENOFIBRATE 48 MG/1
TABLET, COATED ORAL
Qty: 90 TAB | Refills: 1 | Status: CANCELLED | OUTPATIENT
Start: 2020-01-08

## 2020-01-08 NOTE — PATIENT INSTRUCTIONS
Vaccine Information Statement    Recombinant Zoster (Shingles) Vaccine: What You Need to Know    Many Vaccine Information Statements are available in Macedonian and other languages. See www.immunize.org/vis  Hojas de información sobre vacunas están disponibles en español y en muchos otros idiomas. Visite www.immunize.org/vis    1. Why get vaccinated? Recombinant zoster (shingles) vaccine can prevent shingles. Shingles (also called herpes zoster, or just zoster) is a painful skin rash, usually with blisters. In addition to the rash, shingles can cause fever, headache, chills, or upset stomach. More rarely, shingles can lead to pneumonia, hearing problems, blindness, brain inflammation (encephalitis), or death. The most common complication of shingles is long-term nerve pain called postherpetic neuralgia (PHN). PHN occurs in the areas where the shingles rash was, even after the rash clears up. It can last for months or years after the rash goes away. The pain from PHN can be severe and debilitating. About 10 to 18% of people who get shingles will experience PHN. The risk of PHN increases with age. An older adult with shingles is more likely to develop PHN and have longer lasting and more severe pain than a younger person with shingles. Shingles is caused by the varicella zoster virus, the same virus that causes chickenpox. After you have chickenpox, the virus stays in your body and can cause shingles later in life. Shingles cannot be passed from one person to another, but the virus that causes shingles can spread and cause chickenpox in someone who had never had chickenpox or received chickenpox vaccine. 2. Recombinant shingles vaccine    Recombinant shingles vaccine provides strong protection against shingles. By preventing shingles, recombinant shingles vaccine also protects against PHN. Recombinant shingles vaccine is the preferred vaccine for the prevention of shingles.   However, a different vaccine, live shingles vaccine, may be used in some circumstances. The recombinant shingles vaccine is recommended for adults 50 years and older without serious immune problems. It is given as a two-dose series. This vaccine is also recommended for people who have already gotten another type of shingles vaccine, the live shingles vaccine. There is no live virus in this vaccine. Shingles vaccine may be given at the same time as other vaccines. 3. Talk with your health care provider    Tell your vaccine provider if the person getting the vaccine:   Has had an allergic reaction after a previous dose of recombinant shingles vaccine, or has any severe, life-threatening allergies.  Is pregnant or breastfeeding.  Is currently experiencing an episode of shingles. In some cases, your health care provider may decide to postpone shingles vaccination to a future visit. People with minor illnesses, such as a cold, may be vaccinated. People who are moderately or severely ill should usually wait until they recover before getting recombinant shingles vaccine. Your health care provider can give you more information. 4. Risks of a vaccine reaction     A sore arm with mild or moderate pain is very common after recombinant shingles vaccine, affecting about 80% of vaccinated people. Redness and swelling can also happen at the site of the injection.  Tiredness, muscle pain, headache, shivering, fever, stomach pain, and nausea happen after vaccination in more than half of people who receive recombinant shingles vaccine. In clinical trials, about 1 out of 6 people who got recombinant zoster vaccine experienced side effects that prevented them from doing regular activities. Symptoms usually went away on their own in 2 to 3 days. You should still get the second dose of recombinant zoster vaccine even if you had one of these reactions after the first dose.       People sometimes faint after medical procedures, including vaccination. Tell your provider if you feel dizzy or have vision changes or ringing in the ears. As with any medicine, there is a very remote chance of a vaccine causing a severe allergic reaction, other serious injury, or death. 5. What if there is a serious problem? An allergic reaction could occur after the vaccinated person leaves the clinic. If you see signs of a severe allergic reaction (hives, swelling of the face and throat, difficulty breathing, a fast heartbeat, dizziness, or weakness), call 9-1-1 and get the person to the nearest hospital.    For other signs that concern you, call your health care provider. Adverse reactions should be reported to the Vaccine Adverse Event Reporting System (VAERS). Your health care provider will usually file this report, or you can do it yourself. Visit the VAERS website at www.vaers. [facility].gov or call 9-604.929.7203. VAERS is only for reporting reactions, and VAERS staff do not give medical advice. 6. How can I learn more?  Ask your health care provider.  Call your local or state health department.    Contact the Centers for Disease Control and Prevention (CDC):  - Call 1-945.802.8057 (1-800-CDC-INFO) or  - Visit CDCs website at www.cdc.gov/vaccines    Vaccine Information Statement   Recombinant Zoster Vaccine   10/30/2019    [facility] Disease Control and Prevention    Office Use Only

## 2020-01-08 NOTE — PROGRESS NOTES
Pt is here for follow up hypertension, cholesterol, diabetes  Pt is due for foot exam.    1. Have you been to the ER, urgent care clinic since your last visit? Hospitalized since your last visit? No    2. Have you seen or consulted any other health care providers outside of the [address] [place] [address] since your last visit? Include any pap smears or colon screening. No      Pt given Shingrix  vaccine in L deltoid per verbral read back order Carlos Meredith NP. Pt tolerated procedure w/o reaction after wait time.

## 2020-01-08 NOTE — PROGRESS NOTES
HISTORY OF PRESENT ILLNESS  Mandeep Sanz is a 64 y.o. male. Patient presents for chronic care. HPI  Patient saw cardiology last month and was taken off Plavix. Pt ran out of his Tricor for several weeks or longer. Review of Systems   Constitutional: Negative. Eyes: Negative. Respiratory: Negative. Cardiovascular: Negative. Genitourinary: Negative. Neurological: Negative. Visit Vitals  /78 (BP 1 Location: Left arm)   Pulse 76   Temp 98.7 °F (37.1 °C) (Oral)   Resp 16   Ht 5' 10\" (1.778 m)   Wt 170 lb 12.8 oz (77.5 kg)   SpO2 99%   BMI 24.51 kg/m²       Physical Exam  Constitutional:       General: He is not in acute distress. Appearance: Normal appearance. He is normal weight. He is not ill-appearing. Cardiovascular:      Rate and Rhythm: Normal rate and regular rhythm. Heart sounds: No murmur. Pulmonary:      Effort: Pulmonary effort is normal. No respiratory distress. Breath sounds: Normal breath sounds. No stridor. No wheezing, rhonchi or rales. Musculoskeletal:      Right lower leg: No edema. Left lower leg: No edema. Neurological:      Mental Status: He is alert and oriented to person, place, and time. Diabetic foot exam:     Left Foot:   Visual Exam: normal    Pulse DP: 2+ (normal)   Filament test: normal sensation    Vibratory sensation: normal      Right Foot:   Visual Exam: normal    Pulse DP: 2+ (normal)   Filament test: normal sensation    Vibratory sensation: normal      ASSESSMENT and PLAN    ICD-10-CM ICD-9-CM    1. Type 2 diabetes mellitus without complication, without long-term current use of insulin ([facility]) E11.9 250.00 REFERRAL TO OPHTHALMOLOGY      HEMOGLOBIN A1C WITH EAG      MICROALBUMIN, UR, RAND W/ MICROALB/CREAT RATIO   2. Mixed hyperlipidemia E78.2 272.2 rosuvastatin (CRESTOR) 20 mg tablet      LIPID PANEL   3.  Essential hypertension I10 401.9 metoprolol succinate (TOPROL-XL) 50 mg XL tablet      METABOLIC PANEL, COMPREHENSIVE PLAN:  Pt will hold his Tricor for now. Pending labs. Pt will continue to work on diet and exercise. I have discussed the diagnosis with the patient and the intended plan as seen in the above orders. The patient has received an after-visit summary and questions were answered concerning future plans. I have discussed medication side effects and warnings with the patient as well. Patient will call for further questions. Follow-up and Dispositions    · Return in about 6 months (around 7/8/2020) for chronic care.

## 2020-07-11 DIAGNOSIS — I10 ESSENTIAL HYPERTENSION: ICD-10-CM

## 2020-07-11 DIAGNOSIS — E78.2 MIXED HYPERLIPIDEMIA: ICD-10-CM

## 2020-07-11 DIAGNOSIS — E11.9 TYPE 2 DIABETES MELLITUS WITHOUT COMPLICATION, WITHOUT LONG-TERM CURRENT USE OF INSULIN (HCC): ICD-10-CM

## 2020-07-12 RX ORDER — METOPROLOL SUCCINATE 50 MG/1
TABLET, EXTENDED RELEASE ORAL
Qty: 90 TAB | Refills: 1 | Status: SHIPPED | OUTPATIENT
Start: 2020-07-12 | End: 2021-07-01 | Stop reason: SDUPTHER

## 2020-07-12 RX ORDER — DAPAGLIFLOZIN AND METFORMIN HYDROCHLORIDE 5; 1000 MG/1; MG/1
TABLET, FILM COATED, EXTENDED RELEASE ORAL
Qty: 90 TAB | Refills: 1 | Status: SHIPPED | OUTPATIENT
Start: 2020-07-12 | End: 2021-07-01 | Stop reason: SDUPTHER

## 2020-07-12 RX ORDER — ROSUVASTATIN CALCIUM 20 MG/1
TABLET, COATED ORAL
Qty: 90 TAB | Refills: 1 | Status: SHIPPED | OUTPATIENT
Start: 2020-07-12 | End: 2021-07-01 | Stop reason: SDUPTHER

## 2020-07-21 ENCOUNTER — VIRTUAL VISIT (OUTPATIENT)
Dept: FAMILY MEDICINE CLINIC | Age: 60
End: 2020-07-21

## 2021-02-23 ENCOUNTER — TELEPHONE (OUTPATIENT)
Dept: FAMILY MEDICINE CLINIC | Age: 61
End: 2021-02-23

## 2021-02-23 NOTE — TELEPHONE ENCOUNTER
Patient states that he will make appointment after 2nd covid vaccination sometime in April. Patient will also schedule appointment.

## 2021-05-14 DIAGNOSIS — E11.9 TYPE 2 DIABETES MELLITUS WITHOUT COMPLICATION, WITHOUT LONG-TERM CURRENT USE OF INSULIN (HCC): Primary | ICD-10-CM

## 2021-05-20 DIAGNOSIS — E78.2 MIXED HYPERLIPIDEMIA: Primary | ICD-10-CM

## 2021-05-20 DIAGNOSIS — E11.9 TYPE 2 DIABETES MELLITUS WITHOUT COMPLICATION, WITHOUT LONG-TERM CURRENT USE OF INSULIN (HCC): ICD-10-CM

## 2021-07-01 DIAGNOSIS — I10 ESSENTIAL HYPERTENSION: ICD-10-CM

## 2021-07-01 DIAGNOSIS — E78.2 MIXED HYPERLIPIDEMIA: ICD-10-CM

## 2021-07-01 DIAGNOSIS — E11.9 TYPE 2 DIABETES MELLITUS WITHOUT COMPLICATION, WITHOUT LONG-TERM CURRENT USE OF INSULIN (HCC): ICD-10-CM

## 2021-07-01 NOTE — TELEPHONE ENCOUNTER
Last Visit: 1/8/20 with NICK Palacios  Next Appointment: 8/4/21 with NICK Garcia  Previous Refill Encounter(s): 7/12/20 #90 with 1 refill    Requested Prescriptions     Pending Prescriptions Disp Refills    metoprolol succinate (TOPROL-XL) 50 mg XL tablet 90 Tablet 0     Sig: Take 1 Tablet by mouth daily.  rosuvastatin (CRESTOR) 20 mg tablet 90 Tablet 0     Sig: Take 1 Tablet by mouth daily.  dapagliflozin-metformin (Xigduo XR) 5-1,000 mg TBph 90 Tablet 0     Sig: Take 1 Tablet by mouth daily.

## 2021-07-02 RX ORDER — METOPROLOL SUCCINATE 50 MG/1
50 TABLET, EXTENDED RELEASE ORAL DAILY
Qty: 90 TABLET | Refills: 0 | Status: ON HOLD | OUTPATIENT
Start: 2021-07-02 | End: 2021-09-27

## 2021-07-02 RX ORDER — DAPAGLIFLOZIN AND METFORMIN HYDROCHLORIDE 5; 1000 MG/1; MG/1
1 TABLET, FILM COATED, EXTENDED RELEASE ORAL DAILY
Qty: 90 TABLET | Refills: 0 | Status: ON HOLD | OUTPATIENT
Start: 2021-07-02 | End: 2021-09-27

## 2021-07-02 RX ORDER — ROSUVASTATIN CALCIUM 20 MG/1
20 TABLET, COATED ORAL DAILY
Qty: 90 TABLET | Refills: 0 | Status: ON HOLD | OUTPATIENT
Start: 2021-07-02 | End: 2021-09-27

## 2021-08-04 ENCOUNTER — OFFICE VISIT (OUTPATIENT)
Dept: FAMILY MEDICINE CLINIC | Age: 61
End: 2021-08-04
Payer: COMMERCIAL

## 2021-08-04 ENCOUNTER — HOSPITAL ENCOUNTER (OUTPATIENT)
Dept: LAB | Age: 61
Discharge: HOME | End: 2021-08-04
Payer: COMMERCIAL

## 2021-08-04 VITALS
TEMPERATURE: 96.3 F | OXYGEN SATURATION: 98 % | RESPIRATION RATE: 16 BRPM | HEART RATE: 85 BPM | HEIGHT: 70 IN | BODY MASS INDEX: 24.39 KG/M2 | SYSTOLIC BLOOD PRESSURE: 136 MMHG | DIASTOLIC BLOOD PRESSURE: 82 MMHG

## 2021-08-04 DIAGNOSIS — E78.2 MIXED HYPERLIPIDEMIA: ICD-10-CM

## 2021-08-04 DIAGNOSIS — I10 ESSENTIAL HYPERTENSION: Primary | ICD-10-CM

## 2021-08-04 DIAGNOSIS — E11.9 TYPE 2 DIABETES MELLITUS WITHOUT COMPLICATION, WITHOUT LONG-TERM CURRENT USE OF INSULIN (HCC): ICD-10-CM

## 2021-08-04 DIAGNOSIS — K40.91 UNILATERAL RECURRENT INGUINAL HERNIA WITHOUT OBSTRUCTION OR GANGRENE: ICD-10-CM

## 2021-08-04 DIAGNOSIS — I10 ESSENTIAL HYPERTENSION: ICD-10-CM

## 2021-08-04 DIAGNOSIS — Z00.00 ROUTINE MEDICAL EXAM: ICD-10-CM

## 2021-08-04 LAB
ALT SERPL-CCNC: 69 U/L (ref 16–61)
ANION GAP SERPL CALC-SCNC: 6 MMOL/L (ref 3–18)
AST SERPL-CCNC: 62 U/L (ref 10–38)
BUN SERPL-MCNC: 18 MG/DL (ref 7–18)
BUN/CREAT SERPL: 20 (ref 12–20)
CALCIUM SERPL-MCNC: 9.6 MG/DL (ref 8.5–10.1)
CHLORIDE SERPL-SCNC: 101 MMOL/L (ref 100–111)
CHOLEST SERPL-MCNC: 195 MG/DL
CO2 SERPL-SCNC: 29 MMOL/L (ref 21–32)
CREAT SERPL-MCNC: 0.92 MG/DL (ref 0.6–1.3)
EST. AVERAGE GLUCOSE BLD GHB EST-MCNC: 143 MG/DL
GLUCOSE SERPL-MCNC: 161 MG/DL (ref 74–99)
HBA1C MFR BLD: 6.6 % (ref 4.2–5.6)
HDLC SERPL-MCNC: 60 MG/DL (ref 40–60)
HDLC SERPL: 3.3 {RATIO} (ref 0–5)
LDLC SERPL CALC-MCNC: 94.2 MG/DL (ref 0–100)
LIPID PROFILE,FLP: ABNORMAL
POTASSIUM SERPL-SCNC: 4.5 MMOL/L (ref 3.5–5.5)
SODIUM SERPL-SCNC: 136 MMOL/L (ref 136–145)
TRIGL SERPL-MCNC: 204 MG/DL (ref ?–150)
VLDLC SERPL CALC-MCNC: 40.8 MG/DL

## 2021-08-04 PROCEDURE — 84460 ALANINE AMINO (ALT) (SGPT): CPT

## 2021-08-04 PROCEDURE — 36415 COLL VENOUS BLD VENIPUNCTURE: CPT

## 2021-08-04 PROCEDURE — 84450 TRANSFERASE (AST) (SGOT): CPT

## 2021-08-04 PROCEDURE — 80048 BASIC METABOLIC PNL TOTAL CA: CPT

## 2021-08-04 PROCEDURE — 99396 PREV VISIT EST AGE 40-64: CPT | Performed by: NURSE PRACTITIONER

## 2021-08-04 PROCEDURE — 80061 LIPID PANEL: CPT

## 2021-08-04 PROCEDURE — 83036 HEMOGLOBIN GLYCOSYLATED A1C: CPT

## 2021-08-04 NOTE — PROGRESS NOTES
Office Note        Patient: Sandra Jimenez     Subjective:     Sandra Jimenez is a 63 y.o. WHITE/NON- male who was seen in clinic today (8/4/2021) for evaluation of Well Male and Hernia (Non Specific) (notice hernia is getting bigger. )        Diabetes control uncertain, Hypertension borderline controlled, Hyperlipidemia stable. Hypertension/HLD:    Diet and Lifestyle: not attempting to follow a low sodium diet, does not rigorously follow a diabetic diet  Home BP Monitoring: is not measured at home    Cardiovascular ROS: taking medications as instructed, no medication side effects noted, no TIA's, no chest pain on exertion, no dyspnea on exertion, no swelling of ankles.      Lab Results   Component Value Date/Time    Sodium 135 (L) 07/07/2020 01:24 PM    Potassium 3.6 07/07/2020 01:24 PM    Chloride 96 (L) 07/07/2020 01:24 PM    CO2 25 07/07/2020 01:24 PM    Anion gap 14 07/07/2020 01:24 PM    Glucose 154 (H) 07/07/2020 01:24 PM    BUN 11 07/07/2020 01:24 PM    Creatinine 1.0 07/07/2020 01:24 PM    BUN/Creatinine ratio 20 01/08/2020 08:59 AM    GFR est AA >60 07/07/2020 01:24 PM    GFR est non-AA >60 07/07/2020 01:24 PM    Calcium 9.5 07/07/2020 01:24 PM      Lab Results   Component Value Date/Time    WBC 4.8 07/07/2020 01:24 PM    HGB 15.7 07/07/2020 01:24 PM    HCT 45.1 07/07/2020 01:24 PM    PLATELET 619 59/28/4585 01:24 PM    MCV 92.4 07/07/2020 01:24 PM      Lab Results   Component Value Date/Time    Hemoglobin A1c 6.7 (H) 01/08/2020 08:59 AM    Hemoglobin A1c (POC) 7.0 04/01/2016 09:05 AM      Lab Results   Component Value Date/Time    Cholesterol, total 162 01/08/2020 08:59 AM    Cholesterol (POC) 170 03/12/2013 08:35 AM    HDL Cholesterol 51 01/08/2020 08:59 AM    HDL Cholesterol (POC) 50 03/12/2013 08:35 AM    LDL Cholesterol (POC) 70 03/12/2013 08:35 AM    LDL, calculated 70.2 01/08/2020 08:59 AM    VLDL, calculated 40.8 01/08/2020 08:59 AM    Triglyceride 204 (H) 01/08/2020 08:59 AM Triglycerides (POC) 249 03/12/2013 08:35 AM    CHOL/HDL Ratio 3.2 01/08/2020 08:59 AM        Key Antihyperlipidemia Meds             rosuvastatin (CRESTOR) 20 mg tablet (Taking) Take 1 Tablet by mouth daily. fenofibrate nanocrystallized (TRICOR) 48 mg tablet (Taking) TAKE 1 TABLET BY MOUTH EVERY DAY           New concerns: Pt has a left sided hernia that he has had for 15 years. It is now starting to bother him. He says that he has not pain but it has gotten larger over the last few months. He would like to have it evaluated by a surgeon. Pt has been having sleeping difficulties for the past few years. He goes to bed and 9 PM and awake at 2 AM. Pt was encouraged to get sa sleep routine going and try meletonin. Diabetes:    Since last visit, he  reports: no significant changes. He reports medication compliance: compliant most of the time. Medication side effects: none. Diabetic diet compliance: noncompliant some of the time   Activity level:not active    Home glucoses:  Not checking fasting. 150's-170's one hour after a meal.  Hypoglycemic episodes: N/A    Diabetic foot exam:     Left Foot:   Visual Exam: normal    Pulse DP: 2+ (normal)   Filament test: normal sensation    Vibratory sensation: normal      Right Foot:   Visual Exam: normal    Pulse DP: 2+ (normal)   Filament test: normal sensation    Vibratory sensation: normal      Diabetic Foot and Eye Exam  Status   Topic Date Due    Diabetic Foot Care  01/08/2021    Eye Exam  03/23/2023           Objective:     Visit Vitals  /82 (BP 1 Location: Left upper arm, BP Patient Position: Sitting)   Pulse 85   Temp (!) 96.3 °F (35.7 °C)   Resp 16   Ht 5' 10\" (1.778 m)   SpO2 98%   BMI 24.39 kg/m²       Appearance: alert, well appearing, and in no distress, oriented to person, place, and time and normal appearing weight.   Exam: heart sounds normal rate, regular rhythm, normal S1, S2, no murmurs, rubs, clicks or gallops, chest clear, no carotid bruits  Lab review: labs are reviewed, up to date. Assessment & Plan:     diabetes stable, hypertension stable, hyperlipidemia stable. Diabetic issues reviewed with him: diabetic diet discussed in detail, written exchange diet given, home glucose monitoring emphasized, all medications, side effects and compliance discussed carefully, foot care discussed and Podiatry visits discussed and annual eye examinations at Ophthalmology discussed. Hypertension issues reviewed with him: Continue current therapy         ICD-10-CM ICD-9-CM    1. Essential hypertension  A71 615.4 METABOLIC PANEL, BASIC   2. Type 2 diabetes mellitus without complication, without long-term current use of insulin (HCC)  E11.9 250.00 HEMOGLOBIN A1C WITH EAG   3. Mixed hyperlipidemia  E78.2 272.2 LIPID PANEL      ALT      AST   4. Unilateral recurrent inguinal hernia without obstruction or gangrene  K40.91 550.91 REFERRAL TO GENERAL SURGERY     Follow-up and Dispositions    · Return in about 3 months (around 11/4/2021) for Sleeping Difficulties, A1c, (In Office). Disclaimer:    I have discussed the diagnosis with the patient and the intended plan as seen above. The patient understands our medical plan. The risks, benefits and significant side effects of all medications have been reviewed. Anticipated time course and progression of condition reviewed. All questions have been addressed. He received an after visit summary, with information reviewed, and questions answered. Where appropriate, he is instructed to call the clinic if he has not been notified either by phone or through [employer] with the results of his tests or with an appointment plan for any referrals within 1 week(s). The patient  is to call if his condition worsens or fails to improve or if significant side effects are experienced.        Leda Spann NP

## 2021-08-04 NOTE — PROGRESS NOTES
Subjective:     Cecilia Arzola is a 63 y.o. male presenting for annual exam and complete physical.             Review of Systems      Objective:     Visit Vitals  /82 (BP 1 Location: Left upper arm, BP Patient Position: Sitting)   Pulse 85   Temp (!) 96.3 °F (35.7 °C)   Resp 16   Ht 5' 10\" (1.778 m)   SpO2 98%   BMI 24.39 kg/m²     Physical exam:       Assessment/Plan:     ***  .    Marilu Gleason

## 2021-08-04 NOTE — PATIENT INSTRUCTIONS

## 2021-09-01 ENCOUNTER — OFFICE VISIT (OUTPATIENT)
Dept: SURGERY | Age: 61
End: 2021-09-01
Payer: COMMERCIAL

## 2021-09-01 VITALS
SYSTOLIC BLOOD PRESSURE: 126 MMHG | TEMPERATURE: 97.4 F | BODY MASS INDEX: 23.62 KG/M2 | HEIGHT: 70 IN | WEIGHT: 165 LBS | RESPIRATION RATE: 18 BRPM | DIASTOLIC BLOOD PRESSURE: 82 MMHG | OXYGEN SATURATION: 98 % | HEART RATE: 88 BPM

## 2021-09-01 DIAGNOSIS — K40.90 LEFT INGUINAL HERNIA: Primary | ICD-10-CM

## 2021-09-01 PROCEDURE — 99205 OFFICE O/P NEW HI 60 MIN: CPT | Performed by: SURGERY

## 2021-09-01 RX ORDER — SODIUM CHLORIDE 0.9 % (FLUSH) 0.9 %
5-40 SYRINGE (ML) INJECTION AS NEEDED
Status: CANCELLED | OUTPATIENT
Start: 2021-09-01

## 2021-09-01 RX ORDER — SODIUM CHLORIDE 0.9 % (FLUSH) 0.9 %
5-40 SYRINGE (ML) INJECTION EVERY 8 HOURS
Status: CANCELLED | OUTPATIENT
Start: 2021-09-01

## 2021-09-01 NOTE — H&P (VIEW-ONLY)
[facility]  General Surgery    Subjective:     CC: 20-year history of left inguinal hernia which is increasing in size and making noises     HPI: Patient is a very pleasant 60-year-old male with a past medical history remarkable for hypercholesterolemia, history of tobacco use quit 2012, diabetes mellitus type 2, coronary artery disease status post coronary artery stenting, history of chest pain and palpitations. Patient is referred by nurse practitioner Alin Clancy for evaluation and management of enlarging left inguinal hernia. The patient states that the hernia was first noted 20 years ago. He had not had any issues with it until recently. His main issue is that from time to time the hernia makes different sounds especially after meals. It is also increasing in size. He is employed as an  with [employer].     Patient Active Problem List    Diagnosis Date Noted    Hyperchloremia 02/11/2019    Chest pain, unspecified     Personal history of tobacco use, presenting hazards to health     CAD (coronary artery disease) 03/12/2013    Hypercholesteremia 10/08/2012    DM2 (diabetes mellitus, type 2) ([facility] [address] 01/16/2012     Past Medical History:   Diagnosis Date    CAD (coronary artery disease) 2011    coronary artery stent    Chest pain, unspecified     Abnormal nuclear scan    Diabetes ([facility] [address]     Hypercholesterolemia     Palpitations     Personal history of tobacco use, presenting hazards to health       Past Surgical History:   Procedure Laterality Date    COLONOSCOPY N/A 6/7/2017    COLONOSCOPY / polypectomy performed by Diogenes Azar MD at [facility] ENDOSCOPY    HX ORTHOPAEDIC      broken nose    [place] CARDIAC SURG PROCEDURE UNLIST  11/04/2011    stent    [place] COLSC FLX W/RMVL OF TUMOR POLYP LESION SNARE TQ  12/16/15    Dr. Demetra Alvarado f/u 2016    STENT INSERTION        Family History   Problem Relation Age of Onset    Cancer Mother     Cancer Maternal Uncle     Hypertension Father     Cancer Father         colon/rectal CA [de-identified] yo    Colon Cancer Father     Colon Polyps Father     Heart Disease Other         Positive family history of heart disease      Social History     Tobacco Use    Smoking status: Former Smoker     Quit date: 2012     Years since quittin.7    Smokeless tobacco: Never Used   Substance Use Topics    Alcohol use: Not Currently     Comment: weekends      No Known Allergies    Prior to Admission medications    Medication Sig Start Date End Date Taking? Authorizing Provider   metoprolol succinate (TOPROL-XL) 50 mg XL tablet Take 1 Tablet by mouth daily. 21  Yes Ashland Hazard, NP   rosuvastatin (CRESTOR) 20 mg tablet Take 1 Tablet by mouth daily. 21  Yes Ashland Hazard, NP   dapagliflozin-metformin (Xigduo XR) 5-1,000 mg TBph Take 1 Tablet by mouth daily. 21  Yes Ashland Hazard, NP   aspirin delayed-release 81 mg tablet Take  by mouth daily. Yes Provider, Historical   fenofibrate nanocrystallized (TRICOR) 48 mg tablet TAKE 1 TABLET BY MOUTH EVERY DAY 10/21/19  Yes Marvin Palacios, NP   ONETOUCH ULTRA TEST strip TEST ONCE IN THE AM FASTING, THEN 2 HRS AFTER DINNER. DISP: 180 STRIPS. 16  Yes Ana VIZCARRA DO   Blood-Glucose Meter (ONE TOUCH ULTRAMINI) monitoring kit by Does Not Apply route. Test 1 am fasting then 2 hrs after dinner 12  Yes Annabelle Zepeda DO   Lancets ([address]) Misc by Does Not Apply route. Test once in the AM fasting, then 2 hrs after dinner. Disp:  A box for 180 12  Yes Annabelle Zepeda DO       Review of Systems:    14 systems were reviewed. The results are as above in the HPI and otherwise negative. Objective:     Vitals:    21 0837   BP: 126/82   Pulse: 88   Resp: 18   Temp: 97.4 °F (36.3 °C)   SpO2: 98%   Weight: 74.8 kg (165 lb)   Height: 5' 10\" (1.778 m)       Physical Exam:  GENERAL: alert, cooperative, no distress, appears stated age,   EYE: conjunctivae/corneas clear. PERRL, EOM's intact. THROAT & NECK: normal and no erythema or exudates noted. ,    LYMPHATIC: Cervical, supraclavicular, and axillary nodes normal. ,   LUNG: clear to auscultation bilaterally,   HEART: regular rate and rhythm, S1, S2 normal, no murmur, click, rub or gallop,   ABDOMEN: soft, non-tender. Bowel sounds normal. No masses,  no organomegaly,   Genitalia: Both testes are descended into the scrotum. No testicular masses are noted. Moderate testicular tenderness on the left. No evidence of hydrocele or varicocele. Reducible left inguinal hernia. No evidence of right inguinal hernia with cough. EXTREMITIES:  extremities normal, atraumatic, no cyanosis or edema,   SKIN: Normal.,   NEUROLOGIC: AOx3. Cranial nerves 2-12 and sensation grossly intact. ,     Data Review:  to be done    Mr. Ashlee Posey has a reminder for a \"due or due soon\" health maintenance. I have asked that he contact his primary care provider for follow-up on this health maintenance. Impression:     · Patient with symptomatic reducible left inguinal hernia.     Plan:     · Robot assisted laparoscopic left inguinal hernia repair with mesh  · Consent on chart  · Preoperative orders written    Signed By: Connie Todd MD     September 1, 2021

## 2021-09-01 NOTE — PROGRESS NOTES
[facility]  General Surgery    Subjective:     CC: 20-year history of left inguinal hernia which is increasing in size and making noises     HPI: Patient is a very pleasant 61-year-old male with a past medical history remarkable for hypercholesterolemia, history of tobacco use quit 2012, diabetes mellitus type 2, coronary artery disease status post coronary artery stenting, history of chest pain and palpitations. Patient is referred by nurse practitioner Remy Valenzuela for evaluation and management of enlarging left inguinal hernia. The patient states that the hernia was first noted 20 years ago. He had not had any issues with it until recently. His main issue is that from time to time the hernia makes different sounds especially after meals. It is also increasing in size. He is employed as an  with [employer].     Patient Active Problem List    Diagnosis Date Noted    Hyperchloremia 02/11/2019    Chest pain, unspecified     Personal history of tobacco use, presenting hazards to health     CAD (coronary artery disease) 03/12/2013    Hypercholesteremia 10/08/2012    DM2 (diabetes mellitus, type 2) ([facility] [address] 01/16/2012     Past Medical History:   Diagnosis Date    CAD (coronary artery disease) 2011    coronary artery stent    Chest pain, unspecified     Abnormal nuclear scan    Diabetes ([facility] [address]     Hypercholesterolemia     Palpitations     Personal history of tobacco use, presenting hazards to health       Past Surgical History:   Procedure Laterality Date    COLONOSCOPY N/A 6/7/2017    COLONOSCOPY / polypectomy performed by Brice Claude, [facility]COPY    HX ORTHOPAEDIC      broken nose    [place] CARDIAC SURG PROCEDURE UNLIST  11/04/2011    stent    [place] COLSC FLX W/RMVL OF TUMOR POLYP LESION SNARE TQ  12/16/15    Dr. Ger Gutierrez f/u 2016    STENT INSERTION        Family History   Problem Relation Age of Onset    Cancer Mother     Cancer Maternal Uncle     Hypertension Father     Cancer Father         colon/rectal CA [de-identified] yo    Colon Cancer Father     Colon Polyps Father     Heart Disease Other         Positive family history of heart disease      Social History     Tobacco Use    Smoking status: Former Smoker     Quit date: 2012     Years since quittin.7    Smokeless tobacco: Never Used   Substance Use Topics    Alcohol use: Not Currently     Comment: weekends      No Known Allergies    Prior to Admission medications    Medication Sig Start Date End Date Taking? Authorizing Provider   metoprolol succinate (TOPROL-XL) 50 mg XL tablet Take 1 Tablet by mouth daily. 21  Yes Claudell Punches, NP   rosuvastatin (CRESTOR) 20 mg tablet Take 1 Tablet by mouth daily. 21  Yes Claudell Punches, NP   dapagliflozin-metformin (Xigduo XR) 5-1,000 mg TBph Take 1 Tablet by mouth daily. 21  Yes Claudell Punches, NP   aspirin delayed-release 81 mg tablet Take  by mouth daily. Yes Provider, Historical   fenofibrate nanocrystallized (TRICOR) 48 mg tablet TAKE 1 TABLET BY MOUTH EVERY DAY 10/21/19  Yes Ivanna Palacios, NP   ONETOUCH ULTRA TEST strip TEST ONCE IN THE AM FASTING, THEN 2 HRS AFTER DINNER. DISP: 180 STRIPS. 16  Yes Kellen VIZCARRA,    Blood-Glucose Meter (ONE TOUCH ULTRAMINI) monitoring kit by Does Not Apply route. Test 1 am fasting then 2 hrs after dinner 12  Yes Sami Pantjoa DO   Lancets ([address]) Misc by Does Not Apply route. Test once in the AM fasting, then 2 hrs after dinner. Disp:  A box for 180 12  Yes Sami Pantoja, DO       Review of Systems:    14 systems were reviewed. The results are as above in the HPI and otherwise negative. Objective:     Vitals:    21 0837   BP: 126/82   Pulse: 88   Resp: 18   Temp: 97.4 °F (36.3 °C)   SpO2: 98%   Weight: 74.8 kg (165 lb)   Height: 5' 10\" (1.778 m)       Physical Exam:  GENERAL: alert, cooperative, no distress, appears stated age,   EYE: conjunctivae/corneas clear. PERRL, EOM's intact. THROAT & NECK: normal and no erythema or exudates noted. ,    LYMPHATIC: Cervical, supraclavicular, and axillary nodes normal. ,   LUNG: clear to auscultation bilaterally,   HEART: regular rate and rhythm, S1, S2 normal, no murmur, click, rub or gallop,   ABDOMEN: soft, non-tender. Bowel sounds normal. No masses,  no organomegaly,   Genitalia: Both testes are descended into the scrotum. No testicular masses are noted. Moderate testicular tenderness on the left. No evidence of hydrocele or varicocele. Reducible left inguinal hernia. No evidence of right inguinal hernia with cough. EXTREMITIES:  extremities normal, atraumatic, no cyanosis or edema,   SKIN: Normal.,   NEUROLOGIC: AOx3. Cranial nerves 2-12 and sensation grossly intact. ,     Data Review:  to be done    Mr. Aysha Walker has a reminder for a \"due or due soon\" health maintenance. I have asked that he contact his primary care provider for follow-up on this health maintenance. Impression:     · Patient with symptomatic reducible left inguinal hernia.     Plan:     · Robot assisted laparoscopic left inguinal hernia repair with mesh  · Consent on chart  · Preoperative orders written    Signed By: Joyice Koyanagi, MD     September 1, 2021

## 2021-09-01 NOTE — PATIENT INSTRUCTIONS
Inguinal Hernia Repair: Before Your Surgery  What is inguinal hernia repair? Inguinal hernia repair is a type of surgery. An inguinal hernia is a bulge under the skin in your groin. It happens when there is a weak spot in the groin muscle and a piece of the intestines or tissue pokes through the muscle. This can be painful. You may have pain when you're active. Or it may be painful when you strain during a bowel movement or lift something heavy. Surgery can help with your pain. It can also prevent serious problems that can happen if an organ or tissue gets stuck in the hernia. There are two ways to do this surgery. In open surgery, the doctor makes one cut near the hernia. This cut is called an incision. In laparoscopic surgery, the doctor makes several very small incisions and uses a thin, lighted scope and small tools. During surgery, the doctor pushes the bulge back in place. The doctor may place a piece of mesh on top of the bulge to help keep it in place. Then the healthy tissue is sewn back together. Laparoscopic surgery leaves several small scars. Open surgery leaves one long scar. The scars fade with time. After the surgery, you can probably return to light activity after 1 to 3 weeks. How long it takes will depend on the type of surgery. Follow-up care is a key part of your treatment and safety. Be sure to make and go to all appointments, and call your doctor if you are having problems. It's also a good idea to know your test results and keep a list of the medicines you take. How do you prepare for surgery? Surgery can be stressful. This information will help you understand what you can expect. And it will help you safely prepare for surgery. Preparing for surgery    · Be sure you have someone to take you home.  Anesthesia and pain medicine will make it unsafe for you to drive or get home on your own.     · Understand exactly what surgery is planned, along with the risks, benefits, and other options.     · If you take aspirin or some other blood thinner, ask your doctor if you should stop taking it before your surgery. Make sure that you understand exactly what your doctor wants you to do. These medicines increase the risk of bleeding.     · Tell your doctor ALL the medicines, vitamins, supplements, and herbal remedies you take. Some may increase the risk of problems during your surgery. Your doctor will tell you if you should stop taking any of them before the surgery and how soon to do it.     · Make sure your doctor and the hospital have a copy of your advance directive. If you don't have one, you may want to prepare one. It lets others know your health care wishes. It's a good thing to have before any type of surgery or procedure. What happens on the day of surgery? · Follow the instructions exactly about when to stop eating and drinking. If you don't, your surgery may be canceled. If your doctor told you to take your medicines on the day of surgery, take them with only a sip of water.     · Take a bath or shower before you come in for your surgery. Do not apply lotions, perfumes, deodorants, or nail polish.     · Do not shave the surgical site yourself.     · Take off all jewelry and piercings. And take out contact lenses, if you wear them. At the hospital or surgery center   · Bring a picture ID.     · The area for surgery is often marked to make sure there are no errors.     · You will be kept comfortable and safe by your anesthesia provider. The anesthesia may make you sleep. Or it may just numb the area being worked on.     · The surgery will take about 1 to 2 hours. When should you call your doctor? · You have questions or concerns.     · You don't understand how to prepare for your surgery.     · You become ill before the surgery (such as fever, flu, or a cold).     · You need to reschedule or have changed your mind about having the surgery. Where can you learn more?   Go to http://www.gray.com/  Enter P932 in the search box to learn more about \"Inguinal Hernia Repair: Before Your Surgery. \"  Current as of: April 15, 2020               Content Version: 12.8  © 2006-2021 [employer]. Care instructions adapted under license by [employer] (which disclaims liability or warranty for this information). If you have questions about a medical condition or this instruction, always ask your healthcare professional. [address] any warranty or liability for your use of this information.

## 2021-09-02 DIAGNOSIS — R74.8 ABNORMAL AST AND ALT: Primary | ICD-10-CM

## 2021-09-02 NOTE — PROGRESS NOTES
Please let patient know that his hemoglobin A1c is 6.6% and doing well. Also let patient know that his liver function labs were slightly elevated and we will continue to monitor those, and add on labs prior to his next visit.

## 2021-09-22 ENCOUNTER — HOSPITAL ENCOUNTER (OUTPATIENT)
Dept: PREADMISSION TESTING | Age: 61
Discharge: HOME | End: 2021-09-22
Payer: COMMERCIAL

## 2021-09-22 ENCOUNTER — HOSPITAL ENCOUNTER (OUTPATIENT)
Dept: GENERAL RADIOLOGY | Age: 61
Discharge: HOME | End: 2021-09-22
Payer: COMMERCIAL

## 2021-09-22 DIAGNOSIS — K40.90 LEFT INGUINAL HERNIA: ICD-10-CM

## 2021-09-22 LAB
ANION GAP SERPL CALC-SCNC: 8 MMOL/L (ref 3–18)
BASOPHILS # BLD: 0.1 K/UL (ref 0–0.1)
BASOPHILS NFR BLD: 2 % (ref 0–2)
BUN SERPL-MCNC: 13 MG/DL (ref 7–18)
BUN/CREAT SERPL: 13 (ref 12–20)
CALCIUM SERPL-MCNC: 8.9 MG/DL (ref 8.5–10.1)
CHLORIDE SERPL-SCNC: 99 MMOL/L (ref 100–111)
CO2 SERPL-SCNC: 27 MMOL/L (ref 21–32)
CREAT SERPL-MCNC: 1.01 MG/DL (ref 0.6–1.3)
DIFFERENTIAL METHOD BLD: ABNORMAL
EOSINOPHIL # BLD: 0.1 K/UL (ref 0–0.4)
EOSINOPHIL NFR BLD: 4 % (ref 0–5)
ERYTHROCYTE [DISTWIDTH] IN BLOOD BY AUTOMATED COUNT: 12.5 % (ref 11.6–14.5)
GLUCOSE SERPL-MCNC: 224 MG/DL (ref 74–99)
HCT VFR BLD AUTO: 43.6 % (ref 36–48)
HGB BLD-MCNC: 14.9 G/DL (ref 13–16)
LYMPHOCYTES # BLD: 1.1 K/UL (ref 0.9–3.6)
LYMPHOCYTES NFR BLD: 29 % (ref 21–52)
MCH RBC QN AUTO: 31.8 PG (ref 24–34)
MCHC RBC AUTO-ENTMCNC: 34.2 G/DL (ref 31–37)
MCV RBC AUTO: 93.2 FL (ref 78–100)
MONOCYTES # BLD: 0.5 K/UL (ref 0.05–1.2)
MONOCYTES NFR BLD: 13 % (ref 3–10)
NEUTS SEG # BLD: 1.9 K/UL (ref 1.8–8)
NEUTS SEG NFR BLD: 52 % (ref 40–73)
PLATELET # BLD AUTO: 185 K/UL (ref 135–420)
PMV BLD AUTO: 9.8 FL (ref 9.2–11.8)
POTASSIUM SERPL-SCNC: 4 MMOL/L (ref 3.5–5.5)
RBC # BLD AUTO: 4.68 M/UL (ref 4.35–5.65)
SODIUM SERPL-SCNC: 134 MMOL/L (ref 136–145)
WBC # BLD AUTO: 3.7 K/UL (ref 4.6–13.2)

## 2021-09-22 PROCEDURE — 80048 BASIC METABOLIC PNL TOTAL CA: CPT

## 2021-09-22 PROCEDURE — 71046 X-RAY EXAM CHEST 2 VIEWS: CPT

## 2021-09-22 PROCEDURE — U0003 INFECTIOUS AGENT DETECTION BY NUCLEIC ACID (DNA OR RNA); SEVERE ACUTE RESPIRATORY SYNDROME CORONAVIRUS 2 (SARS-COV-2) (CORONAVIRUS DISEASE [COVID-19]), AMPLIFIED PROBE TECHNIQUE, MAKING USE OF HIGH THROUGHPUT TECHNOLOGIES AS DESCRIBED BY CMS-2020-01-R: HCPCS

## 2021-09-22 PROCEDURE — 93005 ELECTROCARDIOGRAM TRACING: CPT

## 2021-09-22 PROCEDURE — 36415 COLL VENOUS BLD VENIPUNCTURE: CPT

## 2021-09-22 PROCEDURE — 85025 COMPLETE CBC W/AUTO DIFF WBC: CPT

## 2021-09-23 LAB
ATRIAL RATE: 75 BPM
CALCULATED P AXIS, ECG09: 51 DEGREES
CALCULATED R AXIS, ECG10: 33 DEGREES
CALCULATED T AXIS, ECG11: 57 DEGREES
DIAGNOSIS, 93000: NORMAL
P-R INTERVAL, ECG05: 128 MS
Q-T INTERVAL, ECG07: 372 MS
QRS DURATION, ECG06: 84 MS
QTC CALCULATION (BEZET), ECG08: 415 MS
SARS-COV-2, NAA: NOT DETECTED
VENTRICULAR RATE, ECG03: 75 BPM

## 2021-09-24 ENCOUNTER — ANESTHESIA EVENT (OUTPATIENT)
Dept: SURGERY | Age: 61
DRG: 981 | End: 2021-09-24
Payer: COMMERCIAL

## 2021-09-24 NOTE — PERIOP NOTES
PRE-SURGICAL INSTRUCTIONS        Patient's Name:  Louetta Essex      YPKPY'U Date:  9/24/2021            Covid Testing Date and Time: Done - Negative    Surgery Date:  9/27/2021                1. Do NOT eat or drink anything, including candy, gum, or ice chips after midnight on 9/26, unless you have specific instructions from your surgeon or anesthesia provider to do so.  2. You may brush your teeth before coming to the hospital.  3. No smoking 24 hours prior to the day of surgery. 4. No alcohol 24 hours prior to the day of surgery. 5. No recreational drugs for one week prior to the day of surgery. 6. Leave all valuables, including money/purse, at home. 7. Remove all jewelry, nail polish, acrylic nails, and makeup (including mascara); no lotions powders, deodorant, or perfume/cologne/after shave on the skin. 8. Follow instruction for Hibiclens washes and CHG wipes from surgeon's office. 9. Glasses/contact lenses and dentures may be worn to the hospital.  They will be removed prior to surgery. 10. Call your doctor if symptoms of a cold or illness develop within 24-48 hours prior to your surgery. 11.  If you are having an outpatient procedure, please make arrangements for a responsible ADULT TO [address] [place] [address] and stay with you for 24 hours after your surgery. 12. ONE VISITOR in the hospital at this time for outpatient procedures. Exceptions may be made for surgical admissions, per nursing unit guidelines      Special Instructions:      Bring any pertinent legal medical records. Take these medications the morning of surgery with a sip of water:  Metoprolol  Follow physician instructions about stopping anticoagulants. Last dose ASA 9/23        On the day of surgery, come in the main entrance of [facility] [place]. Let the  at the desk know you are there for surgery. A staff member will come escort you to the surgical area on the second floor.     If you have any questions or concerns, please do not hesitate to call:     (Prior to the day of surgery) [facility] department:  [phone]   (Day of surgery) Pre-Op department:  [phone]    These surgical instructions were reviewed with Gene during the [facility] phone call.

## 2021-09-27 ENCOUNTER — APPOINTMENT (OUTPATIENT)
Dept: GENERAL RADIOLOGY | Age: 61
DRG: 981 | End: 2021-09-27
Attending: STUDENT IN AN ORGANIZED HEALTH CARE EDUCATION/TRAINING PROGRAM
Payer: COMMERCIAL

## 2021-09-27 ENCOUNTER — APPOINTMENT (OUTPATIENT)
Dept: CT IMAGING | Age: 61
DRG: 981 | End: 2021-09-27
Attending: EMERGENCY MEDICINE
Payer: COMMERCIAL

## 2021-09-27 ENCOUNTER — ANESTHESIA (OUTPATIENT)
Dept: SURGERY | Age: 61
DRG: 981 | End: 2021-09-27
Payer: COMMERCIAL

## 2021-09-27 ENCOUNTER — HOSPITAL ENCOUNTER (OUTPATIENT)
Age: 61
Discharge: HOME | DRG: 981 | End: 2021-09-27
Attending: SURGERY | Admitting: SURGERY
Payer: COMMERCIAL

## 2021-09-27 ENCOUNTER — HOSPITAL ENCOUNTER (INPATIENT)
Age: 61
LOS: 1 days | Discharge: HOME | DRG: 981 | End: 2021-09-28
Attending: STUDENT IN AN ORGANIZED HEALTH CARE EDUCATION/TRAINING PROGRAM | Admitting: SURGERY
Payer: COMMERCIAL

## 2021-09-27 VITALS
HEART RATE: 86 BPM | HEIGHT: 71 IN | WEIGHT: 167 LBS | SYSTOLIC BLOOD PRESSURE: 95 MMHG | TEMPERATURE: 97.4 F | OXYGEN SATURATION: 100 % | DIASTOLIC BLOOD PRESSURE: 60 MMHG | BODY MASS INDEX: 23.38 KG/M2 | RESPIRATION RATE: 11 BRPM

## 2021-09-27 DIAGNOSIS — K66.1 RETROPERITONEAL HEMATOMA: Primary | ICD-10-CM

## 2021-09-27 DIAGNOSIS — G89.18 POSTOPERATIVE PAIN: Primary | ICD-10-CM

## 2021-09-27 DIAGNOSIS — I10 ESSENTIAL HYPERTENSION: ICD-10-CM

## 2021-09-27 DIAGNOSIS — E11.9 TYPE 2 DIABETES MELLITUS WITHOUT COMPLICATION, WITHOUT LONG-TERM CURRENT USE OF INSULIN (HCC): ICD-10-CM

## 2021-09-27 DIAGNOSIS — E78.2 MIXED HYPERLIPIDEMIA: ICD-10-CM

## 2021-09-27 PROBLEM — R58 RETROPERITONEAL BLEED: Status: ACTIVE | Noted: 2021-09-27

## 2021-09-27 PROBLEM — K40.90 LEFT INGUINAL HERNIA: Status: ACTIVE | Noted: 2021-09-27

## 2021-09-27 LAB
ALBUMIN SERPL-MCNC: 2.9 G/DL (ref 3.4–5)
ALBUMIN/GLOB SERPL: 1 {RATIO} (ref 0.8–1.7)
ALP SERPL-CCNC: 48 U/L (ref 45–117)
ALT SERPL-CCNC: 48 U/L (ref 16–61)
ANION GAP SERPL CALC-SCNC: 9 MMOL/L (ref 3–18)
AST SERPL-CCNC: 32 U/L (ref 10–38)
BASOPHILS # BLD: 0.1 K/UL (ref 0–0.1)
BASOPHILS NFR BLD: 0 % (ref 0–2)
BILIRUB SERPL-MCNC: 0.9 MG/DL (ref 0.2–1)
BUN SERPL-MCNC: 13 MG/DL (ref 7–18)
BUN/CREAT SERPL: 14 (ref 12–20)
CALCIUM SERPL-MCNC: 7.8 MG/DL (ref 8.5–10.1)
CHLORIDE SERPL-SCNC: 101 MMOL/L (ref 100–111)
CO2 SERPL-SCNC: 25 MMOL/L (ref 21–32)
CREAT SERPL-MCNC: 0.92 MG/DL (ref 0.6–1.3)
DIFFERENTIAL METHOD BLD: ABNORMAL
EOSINOPHIL # BLD: 0 K/UL (ref 0–0.4)
EOSINOPHIL NFR BLD: 0 % (ref 0–5)
ERYTHROCYTE [DISTWIDTH] IN BLOOD BY AUTOMATED COUNT: 12.4 % (ref 11.6–14.5)
EST. AVERAGE GLUCOSE BLD GHB EST-MCNC: 157 MG/DL
GLOBULIN SER CALC-MCNC: 2.9 G/DL (ref 2–4)
GLUCOSE BLD STRIP.AUTO-MCNC: 157 MG/DL (ref 70–110)
GLUCOSE BLD STRIP.AUTO-MCNC: 209 MG/DL (ref 70–110)
GLUCOSE SERPL-MCNC: 238 MG/DL (ref 74–99)
HBA1C MFR BLD: 7.1 % (ref 4.2–5.6)
HCT VFR BLD AUTO: 29.5 % (ref 36–48)
HGB BLD-MCNC: 10.3 G/DL (ref 13–16)
LYMPHOCYTES # BLD: 0.4 K/UL (ref 0.9–3.6)
LYMPHOCYTES NFR BLD: 3 % (ref 21–52)
MCH RBC QN AUTO: 32.3 PG (ref 24–34)
MCHC RBC AUTO-ENTMCNC: 34.9 G/DL (ref 31–37)
MCV RBC AUTO: 92.5 FL (ref 78–100)
MONOCYTES # BLD: 0.3 K/UL (ref 0.05–1.2)
MONOCYTES NFR BLD: 3 % (ref 3–10)
NEUTS SEG # BLD: 10.4 K/UL (ref 1.8–8)
NEUTS SEG NFR BLD: 93 % (ref 40–73)
PLATELET # BLD AUTO: 177 K/UL (ref 135–420)
PMV BLD AUTO: 10.1 FL (ref 9.2–11.8)
POTASSIUM SERPL-SCNC: 4.3 MMOL/L (ref 3.5–5.5)
PROT SERPL-MCNC: 5.8 G/DL (ref 6.4–8.2)
RBC # BLD AUTO: 3.19 M/UL (ref 4.35–5.65)
SODIUM SERPL-SCNC: 135 MMOL/L (ref 136–145)
TROPONIN I SERPL-MCNC: <0.02 NG/ML (ref 0–0.04)
WBC # BLD AUTO: 11.2 K/UL (ref 4.6–13.2)

## 2021-09-27 PROCEDURE — 83036 HEMOGLOBIN GLYCOSYLATED A1C: CPT

## 2021-09-27 PROCEDURE — 77030019605: Performed by: SURGERY

## 2021-09-27 PROCEDURE — 77030040922 HC BLNKT HYPOTHRM STRY -A: Performed by: SURGERY

## 2021-09-27 PROCEDURE — 74177 CT ABD & PELVIS W/CONTRAST: CPT

## 2021-09-27 PROCEDURE — 74011250636 HC RX REV CODE- 250/636: Performed by: ANESTHESIOLOGY

## 2021-09-27 PROCEDURE — 93005 ELECTROCARDIOGRAM TRACING: CPT

## 2021-09-27 PROCEDURE — 77030035277 HC OBTRTR BLDELSS DISP INTU -B: Performed by: SURGERY

## 2021-09-27 PROCEDURE — 74011000250 HC RX REV CODE- 250: Performed by: NURSE ANESTHETIST, CERTIFIED REGISTERED

## 2021-09-27 PROCEDURE — 00840 ANES IPER PX LOWER ABD NOS: CPT | Performed by: NURSE ANESTHETIST, CERTIFIED REGISTERED

## 2021-09-27 PROCEDURE — 76010000934 HC OR TIME 1 TO 1.5HR INTENSV - TIER 2: Performed by: SURGERY

## 2021-09-27 PROCEDURE — 77030022704 HC SUT VLOC COVD -B: Performed by: SURGERY

## 2021-09-27 PROCEDURE — 74011250636 HC RX REV CODE- 250/636: Performed by: EMERGENCY MEDICINE

## 2021-09-27 PROCEDURE — 2709999900 HC NON-CHARGEABLE SUPPLY: Performed by: SURGERY

## 2021-09-27 PROCEDURE — 74011250637 HC RX REV CODE- 250/637: Performed by: NURSE ANESTHETIST, CERTIFIED REGISTERED

## 2021-09-27 PROCEDURE — 64486 TAP BLOCK UNIL BY INJECTION: CPT | Performed by: ANESTHESIOLOGY

## 2021-09-27 PROCEDURE — 49650 LAP ING HERNIA REPAIR INIT: CPT | Performed by: SURGERY

## 2021-09-27 PROCEDURE — 74011000636 HC RX REV CODE- 636: Performed by: EMERGENCY MEDICINE

## 2021-09-27 PROCEDURE — 76210000019 HC OR PH I REC 4 TO 4.5 HR: Performed by: SURGERY

## 2021-09-27 PROCEDURE — 74011250636 HC RX REV CODE- 250/636: Performed by: NURSE ANESTHETIST, CERTIFIED REGISTERED

## 2021-09-27 PROCEDURE — 77030018673: Performed by: SURGERY

## 2021-09-27 PROCEDURE — 96361 HYDRATE IV INFUSION ADD-ON: CPT

## 2021-09-27 PROCEDURE — 77030016151 HC PROTCTR LNS DFOG COVD -B: Performed by: SURGERY

## 2021-09-27 PROCEDURE — 76210000020 HC REC RM PH II FIRST 0.5 HR: Performed by: SURGERY

## 2021-09-27 PROCEDURE — 74011000250 HC RX REV CODE- 250: Performed by: SURGERY

## 2021-09-27 PROCEDURE — 65660000000 HC RM CCU STEPDOWN

## 2021-09-27 PROCEDURE — 71045 X-RAY EXAM CHEST 1 VIEW: CPT

## 2021-09-27 PROCEDURE — 8E0W4CZ ROBOTIC ASSISTED PROCEDURE OF TRUNK REGION, PERCUTANEOUS ENDOSCOPIC APPROACH: ICD-10-PCS | Performed by: SURGERY

## 2021-09-27 PROCEDURE — 86901 BLOOD TYPING SEROLOGIC RH(D): CPT

## 2021-09-27 PROCEDURE — 77030002933 HC SUT MCRYL J&J -A: Performed by: SURGERY

## 2021-09-27 PROCEDURE — 74011250636 HC RX REV CODE- 250/636: Performed by: STUDENT IN AN ORGANIZED HEALTH CARE EDUCATION/TRAINING PROGRAM

## 2021-09-27 PROCEDURE — 82962 GLUCOSE BLOOD TEST: CPT

## 2021-09-27 PROCEDURE — 74011250636 HC RX REV CODE- 250/636: Performed by: SURGERY

## 2021-09-27 PROCEDURE — C1781 MESH (IMPLANTABLE): HCPCS | Performed by: SURGERY

## 2021-09-27 PROCEDURE — 74011000258 HC RX REV CODE- 258: Performed by: NURSE ANESTHETIST, CERTIFIED REGISTERED

## 2021-09-27 PROCEDURE — 84484 ASSAY OF TROPONIN QUANT: CPT

## 2021-09-27 PROCEDURE — 77030018836 HC SOL IRR NACL ICUM -A: Performed by: SURGERY

## 2021-09-27 PROCEDURE — 85025 COMPLETE CBC W/AUTO DIFF WBC: CPT

## 2021-09-27 PROCEDURE — 86923 COMPATIBILITY TEST ELECTRIC: CPT

## 2021-09-27 PROCEDURE — 0YU64JZ SUPPLEMENT LEFT INGUINAL REGION WITH SYNTHETIC SUBSTITUTE, PERCUTANEOUS ENDOSCOPIC APPROACH: ICD-10-PCS | Performed by: SURGERY

## 2021-09-27 PROCEDURE — 74011000250 HC RX REV CODE- 250: Performed by: ANESTHESIOLOGY

## 2021-09-27 PROCEDURE — 74011636637 HC RX REV CODE- 636/637: Performed by: ANESTHESIOLOGY

## 2021-09-27 PROCEDURE — 77030040830 HC CATH URETH FOL MDII -A: Performed by: SURGERY

## 2021-09-27 PROCEDURE — 77030031139 HC SUT VCRL2 J&J -A: Performed by: SURGERY

## 2021-09-27 PROCEDURE — 80053 COMPREHEN METABOLIC PANEL: CPT

## 2021-09-27 PROCEDURE — 76060000033 HC ANESTHESIA 1 TO 1.5 HR: Performed by: SURGERY

## 2021-09-27 PROCEDURE — 74011636637 HC RX REV CODE- 636/637: Performed by: NURSE ANESTHETIST, CERTIFIED REGISTERED

## 2021-09-27 PROCEDURE — 99284 EMERGENCY DEPT VISIT MOD MDM: CPT

## 2021-09-27 PROCEDURE — S2900 ROBOTIC SURGICAL SYSTEM: HCPCS | Performed by: SURGERY

## 2021-09-27 PROCEDURE — 77030040361 HC SLV COMPR DVT MDII -B: Performed by: SURGERY

## 2021-09-27 PROCEDURE — 77030020703 HC SEAL CANN DISP INTU -B: Performed by: SURGERY

## 2021-09-27 PROCEDURE — 77030033188 HC TBNG FLTRD BIIFUR DISP CNMD -C: Performed by: SURGERY

## 2021-09-27 PROCEDURE — 96374 THER/PROPH/DIAG INJ IV PUSH: CPT

## 2021-09-27 PROCEDURE — 00840 ANES IPER PX LOWER ABD NOS: CPT | Performed by: ANESTHESIOLOGY

## 2021-09-27 DEVICE — 3DMAX MESH, 10.8 CM X 16.0 CM (4.3" X 6.3"), LEFT, LARGE
Type: IMPLANTABLE DEVICE | Site: INGUINAL | Status: FUNCTIONAL
Brand: 3DMAX

## 2021-09-27 RX ORDER — LIDOCAINE HYDROCHLORIDE 20 MG/ML
INJECTION, SOLUTION EPIDURAL; INFILTRATION; INTRACAUDAL; PERINEURAL AS NEEDED
Status: DISCONTINUED | OUTPATIENT
Start: 2021-09-27 | End: 2021-09-27 | Stop reason: HOSPADM

## 2021-09-27 RX ORDER — FAMOTIDINE 20 MG/1
20 TABLET, FILM COATED ORAL ONCE
Status: COMPLETED | OUTPATIENT
Start: 2021-09-27 | End: 2021-09-27

## 2021-09-27 RX ORDER — ONDANSETRON 2 MG/ML
4 INJECTION INTRAMUSCULAR; INTRAVENOUS ONCE
Status: DISCONTINUED | OUTPATIENT
Start: 2021-09-27 | End: 2021-09-27 | Stop reason: HOSPADM

## 2021-09-27 RX ORDER — DEXAMETHASONE SODIUM PHOSPHATE 4 MG/ML
INJECTION, SOLUTION INTRA-ARTICULAR; INTRALESIONAL; INTRAMUSCULAR; INTRAVENOUS; SOFT TISSUE
Status: DISCONTINUED | OUTPATIENT
Start: 2021-09-27 | End: 2021-09-27 | Stop reason: HOSPADM

## 2021-09-27 RX ORDER — SODIUM CHLORIDE, SODIUM LACTATE, POTASSIUM CHLORIDE, CALCIUM CHLORIDE 600; 310; 30; 20 MG/100ML; MG/100ML; MG/100ML; MG/100ML
25 INJECTION, SOLUTION INTRAVENOUS CONTINUOUS
Status: DISCONTINUED | OUTPATIENT
Start: 2021-09-27 | End: 2021-09-27 | Stop reason: HOSPADM

## 2021-09-27 RX ORDER — ROPIVACAINE HYDROCHLORIDE 2 MG/ML
30 INJECTION, SOLUTION EPIDURAL; INFILTRATION; PERINEURAL
Status: DISCONTINUED | OUTPATIENT
Start: 2021-09-27 | End: 2021-09-27 | Stop reason: SDUPTHER

## 2021-09-27 RX ORDER — ROPIVACAINE HYDROCHLORIDE 2 MG/ML
INJECTION, SOLUTION EPIDURAL; INFILTRATION; PERINEURAL
Status: DISCONTINUED | OUTPATIENT
Start: 2021-09-27 | End: 2021-09-27 | Stop reason: HOSPADM

## 2021-09-27 RX ORDER — SODIUM CHLORIDE, SODIUM LACTATE, POTASSIUM CHLORIDE, CALCIUM CHLORIDE 600; 310; 30; 20 MG/100ML; MG/100ML; MG/100ML; MG/100ML
125 INJECTION, SOLUTION INTRAVENOUS CONTINUOUS
Status: DISCONTINUED | OUTPATIENT
Start: 2021-09-27 | End: 2021-09-29 | Stop reason: HOSPADM

## 2021-09-27 RX ORDER — MIDAZOLAM HYDROCHLORIDE 1 MG/ML
2 INJECTION, SOLUTION INTRAMUSCULAR; INTRAVENOUS ONCE
Status: DISCONTINUED | OUTPATIENT
Start: 2021-09-27 | End: 2021-09-27 | Stop reason: HOSPADM

## 2021-09-27 RX ORDER — KETAMINE HCL 50MG/ML(1)
SYRINGE (ML) INTRAVENOUS AS NEEDED
Status: DISCONTINUED | OUTPATIENT
Start: 2021-09-27 | End: 2021-09-27 | Stop reason: HOSPADM

## 2021-09-27 RX ORDER — INSULIN LISPRO 100 [IU]/ML
INJECTION, SOLUTION INTRAVENOUS; SUBCUTANEOUS ONCE
Status: COMPLETED | OUTPATIENT
Start: 2021-09-27 | End: 2021-09-27

## 2021-09-27 RX ORDER — ROCURONIUM BROMIDE 10 MG/ML
INJECTION, SOLUTION INTRAVENOUS AS NEEDED
Status: DISCONTINUED | OUTPATIENT
Start: 2021-09-27 | End: 2021-09-27 | Stop reason: HOSPADM

## 2021-09-27 RX ORDER — EPHEDRINE SULFATE/0.9% NACL/PF 50 MG/5 ML
10 SYRINGE (ML) INTRAVENOUS
Status: DISCONTINUED | OUTPATIENT
Start: 2021-09-27 | End: 2021-09-27 | Stop reason: HOSPADM

## 2021-09-27 RX ORDER — SODIUM CHLORIDE 0.9 % (FLUSH) 0.9 %
5-40 SYRINGE (ML) INJECTION EVERY 8 HOURS
Status: DISCONTINUED | OUTPATIENT
Start: 2021-09-27 | End: 2021-09-27 | Stop reason: HOSPADM

## 2021-09-27 RX ORDER — METFORMIN HYDROCHLORIDE 500 MG/1
1000 TABLET, EXTENDED RELEASE ORAL DAILY
Refills: 0 | Status: DISCONTINUED | OUTPATIENT
Start: 2021-09-28 | End: 2021-09-28

## 2021-09-27 RX ORDER — ROPIVACAINE HYDROCHLORIDE 2 MG/ML
30 INJECTION, SOLUTION EPIDURAL; INFILTRATION; PERINEURAL
Status: COMPLETED | OUTPATIENT
Start: 2021-09-27 | End: 2021-09-27

## 2021-09-27 RX ORDER — SODIUM CHLORIDE 0.9 % (FLUSH) 0.9 %
5-40 SYRINGE (ML) INJECTION AS NEEDED
Status: DISCONTINUED | OUTPATIENT
Start: 2021-09-27 | End: 2021-09-29 | Stop reason: HOSPADM

## 2021-09-27 RX ORDER — ONDANSETRON 2 MG/ML
4 INJECTION INTRAMUSCULAR; INTRAVENOUS
Status: DISCONTINUED | OUTPATIENT
Start: 2021-09-27 | End: 2021-09-29 | Stop reason: HOSPADM

## 2021-09-27 RX ORDER — SODIUM CHLORIDE 0.9 % (FLUSH) 0.9 %
5-40 SYRINGE (ML) INJECTION AS NEEDED
Status: DISCONTINUED | OUTPATIENT
Start: 2021-09-27 | End: 2021-09-27 | Stop reason: HOSPADM

## 2021-09-27 RX ORDER — DEXTROSE 50 % IN WATER (D50W) INTRAVENOUS SYRINGE
25-50 AS NEEDED
Status: DISCONTINUED | OUTPATIENT
Start: 2021-09-27 | End: 2021-09-27 | Stop reason: SDUPTHER

## 2021-09-27 RX ORDER — PROPOFOL 10 MG/ML
INJECTION, EMULSION INTRAVENOUS AS NEEDED
Status: DISCONTINUED | OUTPATIENT
Start: 2021-09-27 | End: 2021-09-27 | Stop reason: HOSPADM

## 2021-09-27 RX ORDER — MAGNESIUM SULFATE 100 %
4 CRYSTALS MISCELLANEOUS AS NEEDED
Status: DISCONTINUED | OUTPATIENT
Start: 2021-09-27 | End: 2021-09-27 | Stop reason: SDUPTHER

## 2021-09-27 RX ORDER — LIDOCAINE HYDROCHLORIDE 10 MG/ML
3 INJECTION INFILTRATION; PERINEURAL ONCE
Status: DISCONTINUED | OUTPATIENT
Start: 2021-09-27 | End: 2021-09-27 | Stop reason: HOSPADM

## 2021-09-27 RX ORDER — SODIUM CHLORIDE, SODIUM LACTATE, POTASSIUM CHLORIDE, CALCIUM CHLORIDE 600; 310; 30; 20 MG/100ML; MG/100ML; MG/100ML; MG/100ML
75 INJECTION, SOLUTION INTRAVENOUS CONTINUOUS
Status: DISCONTINUED | OUTPATIENT
Start: 2021-09-27 | End: 2021-09-29 | Stop reason: HOSPADM

## 2021-09-27 RX ORDER — ROSUVASTATIN CALCIUM 10 MG/1
20 TABLET, COATED ORAL DAILY
Status: DISCONTINUED | OUTPATIENT
Start: 2021-09-28 | End: 2021-09-29 | Stop reason: HOSPADM

## 2021-09-27 RX ORDER — MIDAZOLAM HYDROCHLORIDE 1 MG/ML
INJECTION, SOLUTION INTRAMUSCULAR; INTRAVENOUS AS NEEDED
Status: DISCONTINUED | OUTPATIENT
Start: 2021-09-27 | End: 2021-09-27 | Stop reason: HOSPADM

## 2021-09-27 RX ORDER — MAGNESIUM SULFATE 100 %
16 CRYSTALS MISCELLANEOUS AS NEEDED
Status: DISCONTINUED | OUTPATIENT
Start: 2021-09-27 | End: 2021-09-27 | Stop reason: HOSPADM

## 2021-09-27 RX ORDER — ONDANSETRON 4 MG/1
4 TABLET, ORALLY DISINTEGRATING ORAL
Status: DISCONTINUED | OUTPATIENT
Start: 2021-09-27 | End: 2021-09-29 | Stop reason: HOSPADM

## 2021-09-27 RX ORDER — HYDROMORPHONE HYDROCHLORIDE 1 MG/ML
0.5 INJECTION, SOLUTION INTRAMUSCULAR; INTRAVENOUS; SUBCUTANEOUS
Status: DISCONTINUED | OUTPATIENT
Start: 2021-09-27 | End: 2021-09-27 | Stop reason: HOSPADM

## 2021-09-27 RX ORDER — FAMOTIDINE 20 MG/1
20 TABLET, FILM COATED ORAL 2 TIMES DAILY
Status: DISCONTINUED | OUTPATIENT
Start: 2021-09-28 | End: 2021-09-29 | Stop reason: HOSPADM

## 2021-09-27 RX ORDER — ROSUVASTATIN CALCIUM 20 MG/1
TABLET, COATED ORAL
Qty: 90 TABLET | Refills: 0 | Status: SHIPPED | OUTPATIENT
Start: 2021-09-27 | End: 2021-12-20

## 2021-09-27 RX ORDER — IBUPROFEN 600 MG/1
600 TABLET ORAL EVERY 6 HOURS
Qty: 21 TABLET | Refills: 0 | OUTPATIENT
Start: 2021-09-27 | End: 2021-09-28

## 2021-09-27 RX ORDER — FENOFIBRATE 48 MG/1
48 TABLET, COATED ORAL DAILY
Status: DISCONTINUED | OUTPATIENT
Start: 2021-09-28 | End: 2021-09-29 | Stop reason: HOSPADM

## 2021-09-27 RX ORDER — FENTANYL CITRATE 50 UG/ML
100 INJECTION, SOLUTION INTRAMUSCULAR; INTRAVENOUS ONCE
Status: DISCONTINUED | OUTPATIENT
Start: 2021-09-27 | End: 2021-09-27 | Stop reason: HOSPADM

## 2021-09-27 RX ORDER — FENTANYL CITRATE 50 UG/ML
INJECTION, SOLUTION INTRAMUSCULAR; INTRAVENOUS AS NEEDED
Status: DISCONTINUED | OUTPATIENT
Start: 2021-09-27 | End: 2021-09-27 | Stop reason: HOSPADM

## 2021-09-27 RX ORDER — LIDOCAINE HYDROCHLORIDE 10 MG/ML
0.1 INJECTION, SOLUTION EPIDURAL; INFILTRATION; INTRACAUDAL; PERINEURAL AS NEEDED
Status: DISCONTINUED | OUTPATIENT
Start: 2021-09-27 | End: 2021-09-27 | Stop reason: HOSPADM

## 2021-09-27 RX ORDER — DEXTROSE 50 % IN WATER (D50W) INTRAVENOUS SYRINGE
25-50 AS NEEDED
Status: DISCONTINUED | OUTPATIENT
Start: 2021-09-27 | End: 2021-09-27 | Stop reason: HOSPADM

## 2021-09-27 RX ORDER — OXYCODONE HYDROCHLORIDE 5 MG/1
5 TABLET ORAL
Status: DISCONTINUED | OUTPATIENT
Start: 2021-09-27 | End: 2021-09-29 | Stop reason: HOSPADM

## 2021-09-27 RX ORDER — FENTANYL CITRATE 50 UG/ML
50 INJECTION, SOLUTION INTRAMUSCULAR; INTRAVENOUS AS NEEDED
Status: DISCONTINUED | OUTPATIENT
Start: 2021-09-27 | End: 2021-09-27 | Stop reason: HOSPADM

## 2021-09-27 RX ORDER — METOPROLOL SUCCINATE 50 MG/1
TABLET, EXTENDED RELEASE ORAL
Qty: 90 TABLET | Refills: 0 | Status: SHIPPED | OUTPATIENT
Start: 2021-09-27 | End: 2021-12-20

## 2021-09-27 RX ORDER — METOPROLOL SUCCINATE 25 MG/1
50 TABLET, EXTENDED RELEASE ORAL DAILY
Status: DISCONTINUED | OUTPATIENT
Start: 2021-09-28 | End: 2021-09-29 | Stop reason: HOSPADM

## 2021-09-27 RX ORDER — BUPIVACAINE HYDROCHLORIDE 2.5 MG/ML
INJECTION, SOLUTION EPIDURAL; INFILTRATION; INTRACAUDAL AS NEEDED
Status: DISCONTINUED | OUTPATIENT
Start: 2021-09-27 | End: 2021-09-27 | Stop reason: HOSPADM

## 2021-09-27 RX ORDER — DAPAGLIFLOZIN AND METFORMIN HYDROCHLORIDE 5; 1000 MG/1; MG/1
TABLET, FILM COATED, EXTENDED RELEASE ORAL
Qty: 90 TABLET | Refills: 0 | Status: SHIPPED | OUTPATIENT
Start: 2021-09-27

## 2021-09-27 RX ORDER — DEXAMETHASONE SODIUM PHOSPHATE 4 MG/ML
INJECTION, SOLUTION INTRA-ARTICULAR; INTRALESIONAL; INTRAMUSCULAR; INTRAVENOUS; SOFT TISSUE AS NEEDED
Status: DISCONTINUED | OUTPATIENT
Start: 2021-09-27 | End: 2021-09-27 | Stop reason: HOSPADM

## 2021-09-27 RX ORDER — OXYCODONE HYDROCHLORIDE 5 MG/1
5 TABLET ORAL
Qty: 20 TABLET | Refills: 0 | Status: SHIPPED | OUTPATIENT
Start: 2021-09-27 | End: 2021-09-30

## 2021-09-27 RX ORDER — DOCUSATE SODIUM 100 MG/1
100 CAPSULE, LIQUID FILLED ORAL 2 TIMES DAILY
Qty: 60 CAPSULE | Refills: 2 | Status: SHIPPED | OUTPATIENT
Start: 2021-09-27 | End: 2022-04-22

## 2021-09-27 RX ORDER — ONDANSETRON 2 MG/ML
INJECTION INTRAMUSCULAR; INTRAVENOUS AS NEEDED
Status: DISCONTINUED | OUTPATIENT
Start: 2021-09-27 | End: 2021-09-27 | Stop reason: HOSPADM

## 2021-09-27 RX ORDER — ENOXAPARIN SODIUM 100 MG/ML
40 INJECTION SUBCUTANEOUS DAILY
Status: DISCONTINUED | OUTPATIENT
Start: 2021-09-28 | End: 2021-09-29 | Stop reason: HOSPADM

## 2021-09-27 RX ORDER — SODIUM CHLORIDE 0.9 % (FLUSH) 0.9 %
5-40 SYRINGE (ML) INJECTION EVERY 8 HOURS
Status: DISCONTINUED | OUTPATIENT
Start: 2021-09-27 | End: 2021-09-29 | Stop reason: HOSPADM

## 2021-09-27 RX ORDER — ACETAMINOPHEN 325 MG/1
650 TABLET ORAL EVERY 6 HOURS
Qty: 42 TABLET | Refills: 0 | Status: SHIPPED | OUTPATIENT
Start: 2021-09-27

## 2021-09-27 RX ADMIN — IOPAMIDOL 100 ML: 612 INJECTION, SOLUTION INTRAVENOUS at 20:04

## 2021-09-27 RX ADMIN — INSULIN LISPRO 3 UNITS: 100 INJECTION, SOLUTION INTRAVENOUS; SUBCUTANEOUS at 13:03

## 2021-09-27 RX ADMIN — INSULIN LISPRO 6 UNITS: 100 INJECTION, SOLUTION INTRAVENOUS; SUBCUTANEOUS at 08:33

## 2021-09-27 RX ADMIN — SODIUM CHLORIDE, SODIUM LACTATE, POTASSIUM CHLORIDE, AND CALCIUM CHLORIDE 25 ML/HR: 600; 310; 30; 20 INJECTION, SOLUTION INTRAVENOUS at 08:32

## 2021-09-27 RX ADMIN — PROPOFOL 150 MG: 10 INJECTION, EMULSION INTRAVENOUS at 10:39

## 2021-09-27 RX ADMIN — SODIUM CHLORIDE, SODIUM LACTATE, POTASSIUM CHLORIDE, AND CALCIUM CHLORIDE 1000 ML: 600; 310; 30; 20 INJECTION, SOLUTION INTRAVENOUS at 17:58

## 2021-09-27 RX ADMIN — PHENYLEPHRINE HYDROCHLORIDE 200 MCG: 10 INJECTION INTRAVENOUS at 11:04

## 2021-09-27 RX ADMIN — PHENYLEPHRINE HYDROCHLORIDE 100 MCG: 10 INJECTION INTRAVENOUS at 11:21

## 2021-09-27 RX ADMIN — SODIUM CHLORIDE, SODIUM LACTATE, POTASSIUM CHLORIDE, AND CALCIUM CHLORIDE 125 ML/HR: 600; 310; 30; 20 INJECTION, SOLUTION INTRAVENOUS at 22:42

## 2021-09-27 RX ADMIN — Medication 10 MG: at 12:43

## 2021-09-27 RX ADMIN — PROPOFOL 50 MG: 10 INJECTION, EMULSION INTRAVENOUS at 10:40

## 2021-09-27 RX ADMIN — Medication 25 MG: at 11:29

## 2021-09-27 RX ADMIN — DEXAMETHASONE SODIUM PHOSPHATE 4 MG: 4 INJECTION, SOLUTION INTRA-ARTICULAR; INTRALESIONAL; INTRAMUSCULAR; INTRAVENOUS; SOFT TISSUE at 11:41

## 2021-09-27 RX ADMIN — ROCURONIUM BROMIDE 50 MG: 50 INJECTION INTRAVENOUS at 10:40

## 2021-09-27 RX ADMIN — SODIUM CHLORIDE, SODIUM LACTATE, POTASSIUM CHLORIDE, AND CALCIUM CHLORIDE 75 ML/HR: 600; 310; 30; 20 INJECTION, SOLUTION INTRAVENOUS at 21:26

## 2021-09-27 RX ADMIN — DEXAMETHASONE SODIUM PHOSPHATE 8 MG: 4 INJECTION, SOLUTION INTRAMUSCULAR; INTRAVENOUS at 10:43

## 2021-09-27 RX ADMIN — FENTANYL CITRATE 100 MCG: 50 INJECTION, SOLUTION INTRAMUSCULAR; INTRAVENOUS at 10:39

## 2021-09-27 RX ADMIN — ROPIVACAINE HYDROCHLORIDE 60 MG: 2 INJECTION, SOLUTION EPIDURAL; INFILTRATION at 11:41

## 2021-09-27 RX ADMIN — ONDANSETRON 4 MG: 2 INJECTION INTRAMUSCULAR; INTRAVENOUS at 10:43

## 2021-09-27 RX ADMIN — Medication 25 MG: at 10:47

## 2021-09-27 RX ADMIN — MIDAZOLAM HYDROCHLORIDE 2 MG: 2 INJECTION, SOLUTION INTRAMUSCULAR; INTRAVENOUS at 10:28

## 2021-09-27 RX ADMIN — CEFAZOLIN SODIUM 2 G: 1 INJECTION, POWDER, FOR SOLUTION INTRAMUSCULAR; INTRAVENOUS at 10:42

## 2021-09-27 RX ADMIN — LIDOCAINE HYDROCHLORIDE 100 MG: 20 INJECTION, SOLUTION EPIDURAL; INFILTRATION; INTRACAUDAL; PERINEURAL at 10:39

## 2021-09-27 RX ADMIN — SUGAMMADEX 200 MG: 100 INJECTION, SOLUTION INTRAVENOUS at 11:44

## 2021-09-27 RX ADMIN — PHENYLEPHRINE HYDROCHLORIDE 200 MCG: 10 INJECTION INTRAVENOUS at 11:38

## 2021-09-27 RX ADMIN — SODIUM CHLORIDE, SODIUM LACTATE, POTASSIUM CHLORIDE, AND CALCIUM CHLORIDE 1000 ML: 600; 310; 30; 20 INJECTION, SOLUTION INTRAVENOUS at 19:04

## 2021-09-27 RX ADMIN — FAMOTIDINE 20 MG: 20 TABLET ORAL at 08:33

## 2021-09-27 RX ADMIN — CEFAZOLIN 2 G: 1 INJECTION, POWDER, FOR SOLUTION INTRAMUSCULAR; INTRAVENOUS at 22:41

## 2021-09-27 RX ADMIN — SODIUM CHLORIDE 500 ML: 900 INJECTION, SOLUTION INTRAVENOUS at 13:00

## 2021-09-27 RX ADMIN — ROPIVACAINE HYDROCHLORIDE 30 ML: 2 INJECTION, SOLUTION EPIDURAL; INFILTRATION; PERINEURAL at 14:53

## 2021-09-27 NOTE — ANESTHESIA POSTPROCEDURE EVALUATION
Procedure(s):  ROBOTIC ASSISTED LAPAROSCOPIC LEFT INGUINAL HERNIA REPAIR WITH MESH/TAP BLOCK.    general    Anesthesia Post Evaluation      Multimodal analgesia: multimodal analgesia used between 6 hours prior to anesthesia start to PACU discharge  Patient location during evaluation: PACU  Patient participation: complete - patient participated  Level of consciousness: awake and alert  Pain management: adequate  Airway patency: patent  Anesthetic complications: no  Cardiovascular status: acceptable  Respiratory status: acceptable  Hydration status: acceptable  Post anesthesia nausea and vomiting:  none  Final Post Anesthesia Temperature Assessment:  Normothermia (36.0-37.5 degrees C)      INITIAL Post-op Vital signs:   Vitals Value Taken Time   BP 83/63 09/27/21 1602   Temp 36.3 °C (97.4 °F) 09/27/21 1204   Pulse 91 09/27/21 1606   Resp 14 09/27/21 1606   SpO2 100 % 09/27/21 1606   Vitals shown include unvalidated device data.

## 2021-09-27 NOTE — DISCHARGE INSTRUCTIONS
[facility]  Tristan Weinberg MD, FACS  General Surgery    Pt may remove the dressing and shower in two days. Please use your wash clothe to wash the incision with soap and water once the dressings have been removed  No driving or operating heavy machinery while on narcotic pain medications. Please apply an ice pack to the operative site for 30 minutes 3 times daily to help reduce pain and swelling and the need for narcotic pain medication. No strenuous activity or contact sports for two weeks. No lifting greater than 15 lbs for 2 weeks. Call MD for any redness, swelling, bleeding or pus at the incision. Also call for any nausea, vomiting, increased pain or pain uncontrolled by pain medicine. Patient Education        Hernia Repair: What to Expect at [address] are likely to have pain for the next few days. You may also feel tired and have less energy than normal. This is common. You should feel better after a few days and will probably feel much better in 7 days. For several weeks you may feel discomfort or pulling in the hernia repair when you move. You may have some bruising near the repair site and on your genitals. This is normal. If you have swelling in the genitals, you may be told to wear well-fitting briefs. [employer] bicycle shorts may also provide good support. This care sheet gives you a general idea about how long it will take for you to recover. But each person recovers at a different pace. Follow the steps below to get better as quickly as possible. How can you care for yourself at home? Activity    · Rest when you feel tired. Getting enough sleep will help you recover.     · Try to walk each day. Start by walking a little more than you did the day before. Bit by bit, increase the amount you walk.  Walking boosts blood flow and helps prevent pneumonia and constipation.     · Avoid strenuous activities, such as biking, jogging, weight lifting, or aerobic exercise, until your doctor says it is okay.     · Avoid lifting anything that would make you strain. This may include heavy grocery bags and milk containers, a heavy briefcase or backpack, cat litter or dog food bags, a vacuum , or a child.     · You may drive when you are no longer taking pain medicine and can quickly move your foot from the gas pedal to the brake. You must also be able to sit comfortably for a long period of time, even if you do not plan to go far. You might get caught in traffic.     · Most people are able to return to work within 1 to 2 weeks after surgery.     · You may shower 24 to 48 hours after surgery, if your doctor okays it. Pat the cut (incision) dry. Do not take a bath for the first 2 weeks, or until your doctor tells you it is okay.     · Your doctor will tell you when you can have sex again. Diet    · You can eat your normal diet. If your stomach is upset, try bland, low-fat foods like plain rice, broiled chicken, toast, and yogurt.     · Drink plenty of fluids (unless your doctor tells you not to).     · You may notice that your bowel movements are not regular right after your surgery. This is common. Avoid constipation and straining with bowel movements. You may want to take a fiber supplement every day. If you have not had a bowel movement after a couple of days, ask your doctor about taking a mild laxative. Medicines    · Your doctor will tell you if and when you can restart your medicines. He or she will also give you instructions about taking any new medicines.     · If you take aspirin or some other blood thinner, ask your doctor if and when to start taking it again. Make sure that you understand exactly what your doctor wants you to do.     · Be safe with medicines. Take pain medicines exactly as directed. ? If the doctor gave you a prescription medicine for pain, take it as prescribed.   ? If you are not taking a prescription pain medicine, take an over-the-counter medicine such as acetaminophen (Tylenol), ibuprofen (Advil, Motrin), or naproxen (Aleve). Read and follow all instructions on the label. ? Do not take two or more pain medicines at the same time unless the doctor told you to. Many pain medicines have acetaminophen, which is Tylenol. Too much acetaminophen (Tylenol) can be harmful.     · If your doctor prescribed antibiotics, take them as directed. Do not stop taking them just because you feel better. You need to take the full course of antibiotics.     · If you think your pain medicine is making you sick to your stomach:  ? Take your medicine after meals (unless your doctor has told you not to). ? Ask your doctor for a different pain medicine. Incision care    · If you have strips of tape on the cut (incision) the doctor made, leave the tape on for a week or until it falls off.     · If you have staples closing the cut, you will need to visit your doctor in 1 to 2 weeks to have them removed.     · Wash the area daily with warm, soapy water and pat it dry. Follow-up care is a key part of your treatment and safety. Be sure to make and go to all appointments, and call your doctor if you are having problems. It's also a good idea to know your test results and keep a list of the medicines you take. When should you call for help? Call 911 anytime you think you may need emergency care. For example, call if:    · You passed out (lost consciousness).     · You are short of breath. Call your doctor now or seek immediate medical care if:    · You have pain that does not get better after you take pain medicine.     · You are sick to your stomach and cannot keep fluids down.     · You have signs of a blood clot in your leg (called a deep vein thrombosis), such as:  ? Pain in your calf, back of the knee, thigh, or groin. ?  Redness and swelling in your leg or groin.     · You cannot pass stools or gas.     · Bright red blood has soaked through the bandage over your incision.     · You have loose stitches, or your incision comes open.     · You have signs of infection, such as:  ? Increased pain, swelling, warmth, or redness. ? Red streaks leading from the incision. ? Pus draining from the incision. ? A fever. Watch closely for any changes in your health, and be sure to contact your doctor if you have any problems. Where can you learn more? Go to http://www.gray.com/  Enter K723 in the search box to learn more about \"Hernia Repair: What to Expect at Home. \"  Current as of: February 10, 2021               Content Version: 13.0  © 1633-7265 [employer], [employer]. Care instructions adapted under license by [employer] (which disclaims liability or warranty for this information). If you have questions about a medical condition or this instruction, always ask your healthcare professional. [place] [address] any warranty or liability for your use of this information.

## 2021-09-27 NOTE — ANESTHESIA PROCEDURE NOTES
Peripheral Block    Start time: 9/27/2021 11:37 AM  End time: 9/27/2021 11:42 AM  Performed by: Cullen Joseph MD  Authorized by: Cullen Joseph MD       Pre-procedure:    Indications: at surgeon's request and post-op pain management    Preanesthetic Checklist: patient identified, risks and benefits discussed, site marked, timeout performed, anesthesia consent given and patient being monitored    Timeout Time: 11:37 EDT          Block Type:   Block Type:  TAP  Laterality:  Left  Monitoring:  Standard ASA monitoring, responsive to questions, oxygen, continuous pulse ox, frequent vital sign checks and heart rate  Injection Technique:  Single shot  Procedures: ultrasound guided    Patient Position: supine  Prep: chlorhexidine    Needle Type:  Ultraplex  Needle Gauge:  21 G  Needle Localization:  Ultrasound guidance  Medication Injected:  Dexamethasone (DECADRON) 4 mg/mL injection, 4 mg  ropivacaine (NAROPIN) 2 mg/mL (0.2 %) injection, 60 mg  Med Admin Time: 9/27/2021 11:41 AM    Assessment:  Number of attempts:  1  Injection Assessment:  Negative aspiration for CSF, no paresthesia, local visualized surrounding nerve on ultrasound, negative aspiration for blood, no intravascular symptoms, low pressure verified by pressure monitor and ultrasound image on chart  Patient tolerance:  Patient tolerated the procedure well with no immediate complications

## 2021-09-27 NOTE — Clinical Note
Status[de-identified] INPATIENT [101]   Type of Bed: Telemetry [19]   Cardiac Monitoring Required?: Yes   Inpatient Hospitalization Certified Necessary for the Following Reasons: 3.  Patient receiving treatment that can only be provided in an inpatient setting (further clarification in H&P documentation)   Admitting Diagnosis: Retroperitoneal bleed [[id number]]   Admitting Physician: Carola Paz   Attending Physician: Carola Paz   Estimated Length of Stay: 2 Midnights   Discharge Plan[de-identified] Home with Office Follow-up

## 2021-09-27 NOTE — ED NOTES
IV fluids infusing without noted problems. Frequent rounding on patient; no new complaints, changes, or physical findings noted. Patient updated on which test results are still pending. Encouraged to voice any concerns. All questions/concerns addressed. Offered comfort measures; warm blanket, reposition, dimmed lights. Call bell remains at bedside. Hourly rounding for comfort and pain control remains in progress.

## 2021-09-27 NOTE — ED TRIAGE NOTES
Pt had hernia surgery today, was kept longer due to hypotension. Upon arriving at home, had syncopal episode while getting out of the car.

## 2021-09-27 NOTE — OP NOTES
[address] [place] [address] Dr Tierney [facility], [address] [facility]                                 OPERATIVE REPORT    PATIENT:    Meliza Mckay  MRN:            [id number]      DATE:  2021  BILLIN  ROOM:        @BED@  ATTENDING:   Raisa Potter MD  DICTATING:   Raisa Potter MD    PREOPERATIVE DIAGNOSIS : Left inguinal hernia  POSTOPERATIVE DIAGNOSIS:  Indirect left inguinal hernia  PROCEDURES PERFORMED: Robot-assisted laparoscopic left inguinal hernias  repair with mesh. ESTIMATED BLOOD LOSS: 10 mL. FLUIDS GIVEN:  1000 mL. SPECIMENS REMOVED: None. SURGEON: Negro Sellers MD   ASSISTANT: Gerardo Herndon SA  ANESTHESIA: General and local (0.5% Marcaine plain). FINDINGS: left inguinal hernia  IMPLANTS: Bard 3D Max large left  OPERATIVE INDICATION: Patient with symptomatic left inguinal hernias  DESCRIPTION OF PROCEDURE: The patient was identified in the holding area   with his wife, where consent for robot-assisted laparoscopic left inguinal   hernia repair with mesh was verified. In the operating room, the patient   was placed under general anesthesia. Britt catheter was inserted. The   abdomen was prepped and draped in sterile fashion using chlorhexidine   solution and sterile drapes. The time-out was performed to ensure correct   procedure. The local anesthetic was infiltrated into the skin and deep   dermal tissues at each proposed trocar site prior to the incision. The   initial incision was placed above the umbilicus. This was a 9 mm incision   to accommodate the 8 mm trocar and scope. The Visiport system was used to   safely enter the peritoneal cavity. Pneumoperitoneum was obtained using   carbon dioxide gas to 15 mmHg. The trocar in the left mid clavicular line   was then placed safely into the peritoneal cavity. This was an 8 mm robotic   trocar.  The second 8 mm trocar was placed slightly cephalad and in the mid   clavicular line on the right. Safe entry into the peritoneal cavity was   visualized. The patient was placed in Trendelenburg. The robot was then docked and the robotic instruments were safely inserted   into place under direct visualization. The peritoneum was then incised   using electrocautery at the left anterior superior iliac spine. This dissection was carried laterally from the ASIS, medially to the midline. The peritoneum was then reflected downward. The hernia sac was bluntly dissected away from the surruounding tissues. The hernia sac was then dissected away from the vasculature. The Bard 3D Max large left mesh was inserted through the right-sided trocar. The 0- Vicryl and 2 - 0 V-Loc suture were also inserted through this trocar. The SutureCut needle  was then inserted into the right trocar. The mesh was positioned and then sutured with a stitch in Toan's ligament and medial in the upper   center of the mesh for fixation. The suture was inserted and the peritoneum was closed in a running fashion. The mesh set nicely against the abdominal wall. The peritoneal   closure was tension free. The needles were removed from the peritoneal cavity. The trocars were removed under direct   visualization. Pneumoperitoneum was allowed to deflate. A 4-0 Monocryl   suture was used to close the skin at each trocar site. Mastisol, steristrips and bandaids were used for dressings. The patient tolerated the procedure very well. DISPOSITION: He was stable with the Britt removed, extubated upon transport   to the recovery room.

## 2021-09-27 NOTE — ED NOTES
7:49 PM  Assumed care for the patient from Dr. Ugo Paz at shift change. Patient had laparoscopic robot-assisted hernia repair earlier today and had a syncopal event. He was pending labs and IV hydration then discharge, had positive orthostatic vital signs. The patient had 4 points drop in his H&H and given the recent surgery I be concerned about retroperitoneal hematoma so I ordered a CT of his abdomen and pelvis for active bleeding versus hematoma. The patient has minimal tenderness over his abdomen, distended with subcutaneous air which is consistent with his laparoscopic procedure. 9:23 PM  CT positive for retroperitoneal hematoma with no active extravasation likely originating from the cecum. Case was discussed with Dr. Patric Hinkle and he agrees on the plan of admission for observation and serial H&H's. Patient remains hemodynamically stable on a cardiac monitor. He is allowed clear diet. Discussed the findings with the patient and his wife and they both feel comfortable with the plan. 4:23 AM  Hemoglobin is now 9, since the patient is not tachycardic nor hypotensive I do not think he needs an emergent transfusion, will continue to trend with low threshold to transfuse if it drops below 8 or if he became unstable at any point. I ordered 2 units of blood for type and cross. Paula Camacho MD  4:24 AM        7:13 AM  After talking to the nursing supervisor, there are still no beds available at [facility] so I contacted Dr. Loren Abdalla and Dr. Dominic Barnes and they recommended sending the patient to [facility] ER. This was discussed with the ED attending Dr. Kell Ordonez. I will also update Dr. Patric Hinkle. Patient remains hemodynamically stable.     Pao Romero MD

## 2021-09-27 NOTE — ADDENDUM NOTE
Addendum  created 09/27/21 1929 by Munir Sauceda MD    Attestation recorded in [address] [place] [address], [facility] 97 filed

## 2021-09-27 NOTE — ANESTHESIA PREPROCEDURE EVALUATION
Relevant Problems   No relevant active problems       Anesthetic History   No history of anesthetic complications            Review of Systems / Medical History  Patient summary reviewed and pertinent labs reviewed    Pulmonary  Within defined limits                 Neuro/Psych   Within defined limits           Cardiovascular              CAD and cardiac stents    Exercise tolerance: >4 METS     GI/Hepatic/Renal                Endo/Other    Diabetes: poorly controlled, type 2         Other Findings              Physical Exam    Airway  Mallampati: III  TM Distance: 4 - 6 cm  Neck ROM: decreased range of motion   Mouth opening: Normal     Cardiovascular    Rhythm: regular  Rate: normal         Dental  No notable dental hx       Pulmonary  Breath sounds clear to auscultation               Abdominal  GI exam deferred       Other Findings            Anesthetic Plan    ASA: 3  Anesthesia type: general            Anesthetic plan and risks discussed with: Patient

## 2021-09-27 NOTE — INTERVAL H&P NOTE
Update History & Physical    The Patient's History and Physical of September 1, 2021 was reviewed with the patient and I examined the patient. There was no change. The surgical site was confirmed by the patient and me. Plan:  The risk, benefits, expected outcome, and alternative to the recommended procedure have been discussed with the patient. Patient understands and wants to proceed with the procedure.     Electronically signed by Brendan Vega MD on 9/27/2021 at 10:05 AM

## 2021-09-27 NOTE — DISCHARGE SUMMARY
[facility]  Ludivina Ann MD, [facility]  General Surgery  Discharge Summary     Patient ID:  Narda Perales  [id number]  63 y.o.  1960    Admit Date: 9/27/2021    Discharge Date: 9/27/2021    Admission Diagnoses: Left inguinal hernia [K40.90]    Discharge Diagnoses:    Problem List as of 9/27/2021 Date Reviewed: 9/1/2021        Codes Class Noted - Resolved    Left inguinal hernia ICD-10-CM: K40.90  ICD-9-CM: 550.90  9/27/2021 - Present        Hyperchloremia ICD-10-CM: E87.8  ICD-9-CM: 276.9  2/11/2019 - Present        Chest pain, unspecified ICD-10-CM: R07.9  ICD-9-CM: 786.50  Unknown - Present    Overview Signed 7/30/2013 12:44 PM by Belle[place]amanda Ferrer A     Abnormal nuclear scan             Personal history of tobacco use, presenting hazards to health ICD-10-CM: Z87.891  ICD-9-CM: V15.82  Unknown - Present        CAD (coronary artery disease) ICD-10-CM: I25.10  ICD-9-CM: 414.00  3/12/2013 - Present        Hypercholesteremia ICD-10-CM: E78.00  ICD-9-CM: 272.0  10/8/2012 - Present        DM2 (diabetes mellitus, type 2) ([facility] ICD-10-CM: E11.9  ICD-9-CM: 250.00  1/16/2012 - Present        RESOLVED: Palpitations ICD-10-CM: R00.2  ICD-9-CM: 785.1  Unknown - 8/9/2018               Admission Condition: Good    Discharge Condition: Good    Last Procedure: Procedure(s):  ROBOTIC ASSISTED LAPAROSCOPIC LEFT INGUINAL HERNIA REPAIR WITH MESH/TAP BLOCK    Hospital Course:   Normal hospital course for this procedure. Consults: None    Significant Diagnostic Studies: None    Disposition: home    Patient Instructions:   Current Discharge Medication List      START taking these medications    Details   oxyCODONE IR (ROXICODONE) 5 mg immediate release tablet Take 1 Tablet by mouth every four (4) hours as needed for Pain for up to 3 days.  Max Daily Amount: 30 mg.  Qty: 20 Tablet, Refills: 0    Associated Diagnoses: Postoperative pain      acetaminophen (TYLENOL) 325 mg tablet Take 2 Tablets by mouth every six (6) hours. Indications: pain  Qty: 42 Tablet, Refills: 0      ibuprofen (MOTRIN) 600 mg tablet Take 1 Tablet by mouth every six (6) hours. Qty: 21 Tablet, Refills: 0      docusate sodium (COLACE) 100 mg capsule Take 1 Capsule by mouth two (2) times a day for 90 days. Qty: 60 Capsule, Refills: 2      bisacodyL 5 mg tab Take 5 mg by mouth daily. Qty: 3 Tablet, Refills: 0         CONTINUE these medications which have NOT CHANGED    Details   metoprolol succinate (TOPROL-XL) 50 mg XL tablet Take 1 Tablet by mouth daily. Qty: 90 Tablet, Refills: 0    Associated Diagnoses: Essential hypertension      rosuvastatin (CRESTOR) 20 mg tablet Take 1 Tablet by mouth daily. Qty: 90 Tablet, Refills: 0    Associated Diagnoses: Mixed hyperlipidemia      dapagliflozin-metformin (Xigduo XR) 5-1,000 mg TBph Take 1 Tablet by mouth daily. Qty: 90 Tablet, Refills: 0    Associated Diagnoses: Type 2 diabetes mellitus without complication, without long-term current use of insulin ([facility])      aspirin delayed-release 81 mg tablet Take  by mouth daily. fenofibrate nanocrystallized (TRICOR) 48 mg tablet TAKE 1 TABLET BY MOUTH EVERY DAY  Qty: 15 Tab, Refills: 0    Comments: No refills until Pt is seen and fasting labs are done. Associated Diagnoses: Mixed hyperlipidemia      ONETOUCH ULTRA TEST strip TEST ONCE IN THE AM FASTING, THEN 2 HRS AFTER DINNER. DISP: 180 STRIPS. Qty: 180 Strip, Refills: 2      Blood-Glucose Meter (ONE TOUCH ULTRAMINI) monitoring kit by Does Not Apply route. Test 1 am fasting then 2 hrs after dinner  Qty: 1 Kit, Refills: 0    Associated Diagnoses: Diabetes type 2, uncontrolled ([address]      Lancets (ONE [employer]) Misc by Does Not Apply route. Test once in the AM fasting, then 2 hrs after dinner. Disp:  A box for 180  Qty: 1 Package, Refills: 11    Associated Diagnoses: Diabetes type 2, uncontrolled ([address]           Activity: See surgical instructions  Diet: Low fat, Low cholesterol  Wound Care:  As directed    Follow-up with Dr. Spivey Person in 2 weeks.   Follow-up tests/labs None    Signed:  Serge Abdullahi MD  9/27/2021  10:11 AM

## 2021-09-28 ENCOUNTER — APPOINTMENT (OUTPATIENT)
Dept: CT IMAGING | Age: 61
DRG: 981 | End: 2021-09-28
Attending: SURGERY
Payer: COMMERCIAL

## 2021-09-28 VITALS
TEMPERATURE: 98.3 F | BODY MASS INDEX: 23.38 KG/M2 | WEIGHT: 167 LBS | RESPIRATION RATE: 14 BRPM | HEART RATE: 90 BPM | SYSTOLIC BLOOD PRESSURE: 126 MMHG | HEIGHT: 71 IN | OXYGEN SATURATION: 98 % | DIASTOLIC BLOOD PRESSURE: 93 MMHG

## 2021-09-28 LAB
ANION GAP SERPL CALC-SCNC: 9 MMOL/L (ref 3–18)
APTT PPP: 22.8 SEC (ref 23–36.4)
ATRIAL RATE: 72 BPM
ATRIAL RATE: 84 BPM
BASOPHILS # BLD: 0 K/UL (ref 0–0.1)
BASOPHILS NFR BLD: 0 % (ref 0–2)
BUN SERPL-MCNC: 14 MG/DL (ref 7–18)
BUN/CREAT SERPL: 14 (ref 12–20)
CALCIUM SERPL-MCNC: 8.3 MG/DL (ref 8.5–10.1)
CALCULATED P AXIS, ECG09: 54 DEGREES
CALCULATED P AXIS, ECG09: 63 DEGREES
CALCULATED R AXIS, ECG10: 12 DEGREES
CALCULATED R AXIS, ECG10: 6 DEGREES
CALCULATED T AXIS, ECG11: 55 DEGREES
CALCULATED T AXIS, ECG11: 56 DEGREES
CHLORIDE SERPL-SCNC: 100 MMOL/L (ref 100–111)
CO2 SERPL-SCNC: 28 MMOL/L (ref 21–32)
CREAT SERPL-MCNC: 0.97 MG/DL (ref 0.6–1.3)
DIAGNOSIS, 93000: NORMAL
DIAGNOSIS, 93000: NORMAL
DIFFERENTIAL METHOD BLD: ABNORMAL
EOSINOPHIL # BLD: 0 K/UL (ref 0–0.4)
EOSINOPHIL NFR BLD: 0 % (ref 0–5)
ERYTHROCYTE [DISTWIDTH] IN BLOOD BY AUTOMATED COUNT: 12.4 % (ref 11.6–14.5)
ERYTHROCYTE [DISTWIDTH] IN BLOOD BY AUTOMATED COUNT: 12.7 % (ref 11.6–14.5)
ERYTHROCYTE [DISTWIDTH] IN BLOOD BY AUTOMATED COUNT: 12.7 % (ref 11.6–14.5)
GLUCOSE BLD STRIP.AUTO-MCNC: 177 MG/DL (ref 70–110)
GLUCOSE BLD STRIP.AUTO-MCNC: 208 MG/DL (ref 70–110)
GLUCOSE SERPL-MCNC: 219 MG/DL (ref 74–99)
HCT VFR BLD AUTO: 24.3 % (ref 36–48)
HCT VFR BLD AUTO: 24.7 % (ref 36–48)
HCT VFR BLD AUTO: 26 % (ref 36–48)
HGB BLD-MCNC: 8.4 G/DL (ref 13–16)
HGB BLD-MCNC: 8.5 G/DL (ref 13–16)
HGB BLD-MCNC: 9.1 G/DL (ref 13–16)
HISTORY CHECKED?,CKHIST: NORMAL
INR PPP: 1.1 (ref 0.8–1.2)
LIPASE SERPL-CCNC: 111 U/L (ref 73–393)
LYMPHOCYTES # BLD: 0.9 K/UL (ref 0.9–3.6)
LYMPHOCYTES # BLD: 1.1 K/UL (ref 0.9–3.6)
LYMPHOCYTES # BLD: 1.4 K/UL (ref 0.9–3.6)
LYMPHOCYTES NFR BLD: 10 % (ref 21–52)
LYMPHOCYTES NFR BLD: 14 % (ref 21–52)
LYMPHOCYTES NFR BLD: 19 % (ref 21–52)
MAGNESIUM SERPL-MCNC: 1.4 MG/DL (ref 1.6–2.6)
MCH RBC QN AUTO: 32.2 PG (ref 24–34)
MCH RBC QN AUTO: 32.3 PG (ref 24–34)
MCH RBC QN AUTO: 32.6 PG (ref 24–34)
MCHC RBC AUTO-ENTMCNC: 34 G/DL (ref 31–37)
MCHC RBC AUTO-ENTMCNC: 35 G/DL (ref 31–37)
MCHC RBC AUTO-ENTMCNC: 35 G/DL (ref 31–37)
MCV RBC AUTO: 91.9 FL (ref 78–100)
MCV RBC AUTO: 93.1 FL (ref 78–100)
MCV RBC AUTO: 95 FL (ref 78–100)
MONOCYTES # BLD: 0.9 K/UL (ref 0.05–1.2)
MONOCYTES # BLD: 0.9 K/UL (ref 0.05–1.2)
MONOCYTES # BLD: 1 K/UL (ref 0.05–1.2)
MONOCYTES NFR BLD: 11 % (ref 3–10)
MONOCYTES NFR BLD: 12 % (ref 3–10)
MONOCYTES NFR BLD: 12 % (ref 3–10)
NEUTS SEG # BLD: 5.1 K/UL (ref 1.8–8)
NEUTS SEG # BLD: 5.8 K/UL (ref 1.8–8)
NEUTS SEG # BLD: 7.3 K/UL (ref 1.8–8)
NEUTS SEG NFR BLD: 69 % (ref 40–73)
NEUTS SEG NFR BLD: 74 % (ref 40–73)
NEUTS SEG NFR BLD: 79 % (ref 40–73)
P-R INTERVAL, ECG05: 126 MS
P-R INTERVAL, ECG05: 128 MS
PHOSPHATE SERPL-MCNC: 3.1 MG/DL (ref 2.5–4.9)
PLATELET # BLD AUTO: 127 K/UL (ref 135–420)
PLATELET # BLD AUTO: 148 K/UL (ref 135–420)
PLATELET # BLD AUTO: 157 K/UL (ref 135–420)
PMV BLD AUTO: 10.2 FL (ref 9.2–11.8)
PMV BLD AUTO: 10.4 FL (ref 9.2–11.8)
PMV BLD AUTO: 10.5 FL (ref 9.2–11.8)
POTASSIUM SERPL-SCNC: 4.3 MMOL/L (ref 3.5–5.5)
PROTHROMBIN TIME: 14.5 SEC (ref 11.5–15.2)
Q-T INTERVAL, ECG07: 378 MS
Q-T INTERVAL, ECG07: 406 MS
QRS DURATION, ECG06: 80 MS
QRS DURATION, ECG06: 82 MS
QTC CALCULATION (BEZET), ECG08: 444 MS
QTC CALCULATION (BEZET), ECG08: 446 MS
RBC # BLD AUTO: 2.6 M/UL (ref 4.35–5.65)
RBC # BLD AUTO: 2.61 M/UL (ref 4.35–5.65)
RBC # BLD AUTO: 2.83 M/UL (ref 4.35–5.65)
SODIUM SERPL-SCNC: 137 MMOL/L (ref 136–145)
VENTRICULAR RATE, ECG03: 72 BPM
VENTRICULAR RATE, ECG03: 84 BPM
WBC # BLD AUTO: 7.4 K/UL (ref 4.6–13.2)
WBC # BLD AUTO: 7.8 K/UL (ref 4.6–13.2)
WBC # BLD AUTO: 9.3 K/UL (ref 4.6–13.2)

## 2021-09-28 PROCEDURE — 74011000636 HC RX REV CODE- 636: Performed by: SURGERY

## 2021-09-28 PROCEDURE — 83735 ASSAY OF MAGNESIUM: CPT

## 2021-09-28 PROCEDURE — 83690 ASSAY OF LIPASE: CPT

## 2021-09-28 PROCEDURE — 85025 COMPLETE CBC W/AUTO DIFF WBC: CPT

## 2021-09-28 PROCEDURE — 84100 ASSAY OF PHOSPHORUS: CPT

## 2021-09-28 PROCEDURE — 85610 PROTHROMBIN TIME: CPT

## 2021-09-28 PROCEDURE — 74011250636 HC RX REV CODE- 250/636: Performed by: SURGERY

## 2021-09-28 PROCEDURE — 80048 BASIC METABOLIC PNL TOTAL CA: CPT

## 2021-09-28 PROCEDURE — 82962 GLUCOSE BLOOD TEST: CPT

## 2021-09-28 PROCEDURE — 74011250637 HC RX REV CODE- 250/637: Performed by: SURGERY

## 2021-09-28 PROCEDURE — 74174 CTA ABD&PLVS W/CONTRAST: CPT

## 2021-09-28 PROCEDURE — 85730 THROMBOPLASTIN TIME PARTIAL: CPT

## 2021-09-28 PROCEDURE — 74011000250 HC RX REV CODE- 250: Performed by: SURGERY

## 2021-09-28 RX ORDER — ACETAMINOPHEN 325 MG/1
650 TABLET ORAL EVERY 6 HOURS
Status: DISCONTINUED | OUTPATIENT
Start: 2021-09-28 | End: 2021-09-29 | Stop reason: HOSPADM

## 2021-09-28 RX ORDER — SODIUM CHLORIDE 9 MG/ML
250 INJECTION, SOLUTION INTRAVENOUS AS NEEDED
Status: DISCONTINUED | OUTPATIENT
Start: 2021-09-28 | End: 2021-09-29 | Stop reason: HOSPADM

## 2021-09-28 RX ORDER — INSULIN LISPRO 100 [IU]/ML
INJECTION, SOLUTION INTRAVENOUS; SUBCUTANEOUS
Status: DISCONTINUED | OUTPATIENT
Start: 2021-09-28 | End: 2021-09-29 | Stop reason: HOSPADM

## 2021-09-28 RX ORDER — DEXTROSE 50 % IN WATER (D50W) INTRAVENOUS SYRINGE
25-50 AS NEEDED
Status: DISCONTINUED | OUTPATIENT
Start: 2021-09-28 | End: 2021-09-29 | Stop reason: HOSPADM

## 2021-09-28 RX ORDER — MAGNESIUM SULFATE 100 %
4 CRYSTALS MISCELLANEOUS AS NEEDED
Status: DISCONTINUED | OUTPATIENT
Start: 2021-09-28 | End: 2021-09-29 | Stop reason: HOSPADM

## 2021-09-28 RX ADMIN — ACETAMINOPHEN 650 MG: 325 TABLET ORAL at 17:58

## 2021-09-28 RX ADMIN — Medication 10 ML: at 14:00

## 2021-09-28 RX ADMIN — ACETAMINOPHEN 650 MG: 325 TABLET ORAL at 12:32

## 2021-09-28 RX ADMIN — FAMOTIDINE 20 MG: 20 TABLET ORAL at 12:32

## 2021-09-28 RX ADMIN — CEFAZOLIN 2 G: 1 INJECTION, POWDER, FOR SOLUTION INTRAMUSCULAR; INTRAVENOUS at 06:36

## 2021-09-28 RX ADMIN — IOPAMIDOL 100 ML: 755 INJECTION, SOLUTION INTRAVENOUS at 16:41

## 2021-09-28 RX ADMIN — OXYCODONE HYDROCHLORIDE 5 MG: 5 TABLET ORAL at 07:51

## 2021-09-28 RX ADMIN — FAMOTIDINE 20 MG: 20 TABLET ORAL at 17:58

## 2021-09-28 RX ADMIN — ONDANSETRON 4 MG: 4 TABLET, ORALLY DISINTEGRATING ORAL at 07:51

## 2021-09-28 RX ADMIN — ROSUVASTATIN CALCIUM 20 MG: 10 TABLET, COATED ORAL at 12:32

## 2021-09-28 RX ADMIN — SODIUM CHLORIDE, SODIUM LACTATE, POTASSIUM CHLORIDE, AND CALCIUM CHLORIDE 125 ML/HR: 600; 310; 30; 20 INJECTION, SOLUTION INTRAVENOUS at 12:22

## 2021-09-28 NOTE — PROGRESS NOTES
completed the initial Spiritual Assessment of the patient and family ember in room 1 of the emergency room and offered Pastoral Care support to the patient. . Patient does not have an advance directive completed at this time. Patient does not have any Voodoo/cultural needs that will affect patients preferences in health care. Chaplains will continue to follow and will provide pastoral care on an as needed/requested basis.     Shalonda Neville  [facility] Care Department  [phone]

## 2021-09-28 NOTE — PROGRESS NOTES
Metformin ER 1000mg was therapeutically interchanged for Metformin ER-Dapagliflozin (Xigduo XR) 1000/5 mg per the P&T Committee approved Therapeutic Interchanges Policy.

## 2021-09-28 NOTE — H&P
[facility]  General Surgery    Subjective:     CC: Anemia of acute blood loss syncope and abdominal pain     HPI: Patient is status post robot-assisted laparoscopic left inguinal hernia repair with mesh today. He has some hypertension blood pressure 80/50 in the recovery room which improved with fluid. However when he arrived home he stood up out of the car and had a syncopal episode. EMS brought him to the emergency department at [facility] where he was found to have a hemoglobin of 10 decreased from 14 preadmission. The patient has remained hemodynamically stable but CAT scan did reveal retroperitoneal hematoma on the right side of the body tracking around the second third portion of the duodenum kidney and cecum. He has had a second hemoglobin checked that was 9. His pain is rated around 5-7 and he is refusing additional pain medication.     Patient Active Problem List    Diagnosis Date Noted    Left inguinal hernia 09/27/2021    Retroperitoneal bleed 09/27/2021    Hyperchloremia 02/11/2019    Chest pain, unspecified     Personal history of tobacco use, presenting hazards to health     CAD (coronary artery disease) 03/12/2013    Hypercholesteremia 10/08/2012    DM2 (diabetes mellitus, type 2) ([facility] 01/16/2012     Past Medical History:   Diagnosis Date    CAD (coronary artery disease) 2011    coronary artery stent    Diabetes ([facility]     Hypercholesterolemia     Personal history of tobacco use, presenting hazards to health       Past Surgical History:   Procedure Laterality Date    COLONOSCOPY N/A 6/7/2017    COLONOSCOPY / polypectomy performed by Vinh Wild MD at [facility] ENDOSCOPY    HX [address]    broken nose    MT CARDIAC SURG PROCEDURE UNLIST  11/04/2011    stent    [place] COLSC FLX W/RMVL OF TUMOR POLYP LESION SNARE TQ  12/16/15    Dr. Clarisa Brothers f/u 2016    STENT INSERTION        Family History   Problem Relation Age of Onset    Cancer Mother     Cancer Maternal Uncle     Hypertension Father     Cancer Father         colon/rectal CA [de-identified] yo    Colon Cancer Father     Colon Polyps Father     Heart Disease Other         Positive family history of heart disease      Social History     Tobacco Use    Smoking status: Former Smoker     Quit date: 2012     Years since quittin.8    Smokeless tobacco: Never Used   Substance Use Topics    Alcohol use: Yes     Comment: Beer in summer      No Known Allergies    Prior to Admission medications    Medication Sig Start Date End Date Taking? Authorizing Provider   Xigduo XR 5-1,000 mg TBph TAKE 1 TABLET BY MOUTH EVERY DAY 21   Andry Larios NP   metoprolol succinate (TOPROL-XL) 50 mg XL tablet TAKE 1 TABLET BY MOUTH EVERY DAY 21   Odealisa CoppingNICK   rosuvastatin (CRESTOR) 20 mg tablet TAKE 1 TABLET BY MOUTH EVERY DAY 21   Odealisa Copra, NICK   oxyCODONE IR (ROXICODONE) 5 mg immediate release tablet Take 1 Tablet by mouth every four (4) hours as needed for Pain for up to 3 days. Max Daily Amount: 30 mg. 21  Brianna Monaco MD   acetaminophen (TYLENOL) 325 mg tablet Take 2 Tablets by mouth every six (6) hours. Indications: pain 21   Brianna Monaco MD   ibuprofen (MOTRIN) 600 mg tablet Take 1 Tablet by mouth every six (6) hours. 21   Brianna Monaco MD   docusate sodium (COLACE) 100 mg capsule Take 1 Capsule by mouth two (2) times a day for 90 days. 21  Brianna Monaco MD   bisacodyL 5 mg tab Take 5 mg by mouth daily. 21   Brianna Monaco MD   aspirin delayed-release 81 mg tablet Take  by mouth daily. Provider, Historical   fenofibrate nanocrystallized (TRICOR) 48 mg tablet TAKE 1 TABLET BY MOUTH EVERY DAY 10/21/19   Baxter, Zenia Boxer, NP   ONETOUCH ULTRA TEST strip TEST ONCE IN THE AM FASTING, THEN 2 HRS AFTER DINNER.  DISP: [address] [employer]   Nhan Garg DO   Blood-Glucose Meter (ONE TOUCH ULTRAMINI) monitoring kit by Does Not Apply route. Test 1 am fasting then 2 hrs after dinner 1/16/12   [place]roll[place] Gave, DO   Lancets ([address]) Misc by Does Not Apply route. Test once in the AM fasting, then 2 hrs after dinner. Disp:  A box for 180 1/16/12   [place] Gave, DO       Review of Systems:    14 systems were reviewed. The results are as above in the HPI and otherwise negative. Objective:     Vitals:    09/28/21 0200 09/28/21 0400 09/28/21 0500 09/28/21 0600   BP: 100/68 93/70 107/68 116/70   Pulse: 80 90 79 83   Resp: 11 16 11 12   Temp:   98.3 °F (36.8 °C)    SpO2: 96% 96% 97% 96%   Weight:       Height:           Physical Exam:  GENERAL: alert, cooperative, no distress, appears stated age,   EYE: conjunctivae/corneas clear. PERRL, EOM's intact. THROAT & NECK: normal and no erythema or exudates noted. ,    LYMPHATIC: Cervical, supraclavicular, and axillary nodes normal. ,   LUNG: clear to auscultation bilaterally,   HEART: regular rate and rhythm, S1, S2 normal, no murmur, click, rub or gallop,   ABDOMEN: soft, non-distended, minimal tenderness. Incisions are clean dry and intact. bowel sounds normal. No masses,  no organomegaly,   EXTREMITIES:  extremities normal, atraumatic, no cyanosis or edema,   SKIN: Normal.,   NEUROLOGIC: AOx3. Cranial nerves 2-12 and sensation grossly intact. ,     Data Review:  Recent Results (from the past 24 hour(s))   GLUCOSE, POC    Collection Time: 09/27/21  8:25 AM   Result Value Ref Range    Glucose (POC) 209 (H) 70 - 110 mg/dL   GLUCOSE, POC    Collection Time: 09/27/21 12:39 PM   Result Value Ref Range    Glucose (POC) 157 (H) 70 - 110 mg/dL   TROPONIN I    Collection Time: 09/27/21  6:55 PM   Result Value Ref Range    Troponin-I, QT <0.02 0.0 - 0.045 NG/ML   CBC WITH AUTOMATED DIFF    Collection Time: 09/27/21  6:55 PM   Result Value Ref Range    WBC 11.2 4.6 - 13.2 K/uL    RBC 3.19 (L) 4.35 - 5.65 M/uL    HGB 10.3 (L) 13.0 - 16.0 g/dL    HCT 29.5 (L) 36.0 - 48.0 %    MCV 92.5 78.0 - 100.0 FL    MCH 32.3 24.0 - 34.0 PG    MCHC 34.9 31.0 - 37.0 g/dL    RDW 12.4 11.6 - 14.5 %    PLATELET 016 534 - 445 K/uL    MPV 10.1 9.2 - 11.8 FL    NEUTROPHILS 93 (H) 40 - 73 %    LYMPHOCYTES 3 (L) 21 - 52 %    MONOCYTES 3 3 - 10 %    EOSINOPHILS 0 0 - 5 %    BASOPHILS 0 0 - 2 %    ABS. NEUTROPHILS 10.4 (H) 1.8 - 8.0 K/UL    ABS. LYMPHOCYTES 0.4 (L) 0.9 - 3.6 K/UL    ABS. MONOCYTES 0.3 0.05 - 1.2 K/UL    ABS. EOSINOPHILS 0.0 0.0 - 0.4 K/UL    ABS. BASOPHILS 0.1 0.0 - 0.1 K/UL    DF AUTOMATED     METABOLIC PANEL, COMPREHENSIVE    Collection Time: 09/27/21  6:55 PM   Result Value Ref Range    Sodium 135 (L) 136 - 145 mmol/L    Potassium 4.3 3.5 - 5.5 mmol/L    Chloride 101 100 - 111 mmol/L    CO2 25 21 - 32 mmol/L    Anion gap 9 3.0 - 18 mmol/L    Glucose 238 (H) 74 - 99 mg/dL    BUN 13 7.0 - 18 MG/DL    Creatinine 0.92 0.6 - 1.3 MG/DL    BUN/Creatinine ratio 14 12 - 20      GFR est AA >60 >60 ml/min/1.73m2    GFR est non-AA >60 >60 ml/min/1.73m2    Calcium 7.8 (L) 8.5 - 10.1 MG/DL    Bilirubin, total 0.9 0.2 - 1.0 MG/DL    ALT (SGPT) 48 16 - 61 U/L    AST (SGOT) 32 10 - 38 U/L    Alk.  phosphatase 48 45 - 117 U/L    Protein, total 5.8 (L) 6.4 - 8.2 g/dL    Albumin 2.9 (L) 3.4 - 5.0 g/dL    Globulin 2.9 2.0 - 4.0 g/dL    A-G Ratio 1.0 0.8 - 1.7     HEMOGLOBIN A1C WITH EAG    Collection Time: 09/27/21  6:55 PM   Result Value Ref Range    Hemoglobin A1c 7.1 (H) 4.2 - 5.6 %    Est. average glucose 157 mg/dL   EKG, 12 LEAD, INITIAL    Collection Time: 09/27/21  7:11 PM   Result Value Ref Range    Ventricular Rate 84 BPM    Atrial Rate 84 BPM    P-R Interval 128 ms    QRS Duration 82 ms    Q-T Interval 378 ms    QTC Calculation (Bezet) 446 ms    Calculated P Axis 54 degrees    Calculated R Axis 6 degrees    Calculated T Axis 56 degrees    Diagnosis       Normal sinus rhythm  Nonspecific ST abnormality  Abnormal ECG  When compared with ECG of 22-SEP-2021 10:27,  T wave amplitude has decreased in Inferior leads  T wave amplitude has decreased in Anterolateral leads  Confirmed by Prince Lee ([id number]) on 9/28/2021 6:47:25 AM     TYPE & SCREEN    Collection Time: 09/27/21  9:26 PM   Result Value Ref Range    Crossmatch Expiration 09/30/2021,2359     ABO/Rh(D) Carmen Ada POSITIVE     Antibody screen NEG     CALLED TO: [facility] ED 0530 ON [id number] [facility]     Unit number [id number]     Blood component type RC LR,1     Unit division 00     Status of unit ALLOCATED     Crossmatch result Compatible    RBC, ALLOCATE    Collection Time: 09/27/21  9:26 PM   Result Value Ref Range    HISTORY CHECKED? Historical check performed    METABOLIC PANEL, BASIC    Collection Time: 09/28/21  3:50 AM   Result Value Ref Range    Sodium 137 136 - 145 mmol/L    Potassium 4.3 3.5 - 5.5 mmol/L    Chloride 100 100 - 111 mmol/L    CO2 28 21 - 32 mmol/L    Anion gap 9 3.0 - 18 mmol/L    Glucose 219 (H) 74 - 99 mg/dL    BUN 14 7.0 - 18 MG/DL    Creatinine 0.97 0.6 - 1.3 MG/DL    BUN/Creatinine ratio 14 12 - 20      GFR est AA >60 >60 ml/min/1.73m2    GFR est non-AA >60 >60 ml/min/1.73m2    Calcium 8.3 (L) 8.5 - 10.1 MG/DL   MAGNESIUM    Collection Time: 09/28/21  3:50 AM   Result Value Ref Range    Magnesium 1.4 (L) 1.6 - 2.6 mg/dL   PHOSPHORUS    Collection Time: 09/28/21  3:50 AM   Result Value Ref Range    Phosphorus 3.1 2.5 - 4.9 MG/DL   LIPASE    Collection Time: 09/28/21  3:50 AM   Result Value Ref Range    Lipase 111 73 - 393 U/L   CBC WITH AUTOMATED DIFF    Collection Time: 09/28/21  3:50 AM   Result Value Ref Range    WBC 9.3 4.6 - 13.2 K/uL    RBC 2.83 (L) 4.35 - 5.65 M/uL    HGB 9.1 (L) 13.0 - 16.0 g/dL    HCT 26.0 (L) 36.0 - 48.0 %    MCV 91.9 78.0 - 100.0 FL    MCH 32.2 24.0 - 34.0 PG    MCHC 35.0 31.0 - 37.0 g/dL    RDW 12.4 11.6 - 14.5 %    PLATELET 640 599 - 446 K/uL    MPV 10.5 9.2 - 11.8 FL    NEUTROPHILS 79 (H) 40 - 73 %    LYMPHOCYTES 10 (L) 21 - 52 %    MONOCYTES 11 (H) 3 - 10 %    EOSINOPHILS 0 0 - 5 %    BASOPHILS 0 0 - 2 %    ABS.  NEUTROPHILS 7. 3 1.8 - 8.0 K/UL    ABS. LYMPHOCYTES 0.9 0.9 - 3.6 K/UL    ABS. MONOCYTES 1.0 0.05 - 1.2 K/UL    ABS. EOSINOPHILS 0.0 0.0 - 0.4 K/UL    ABS. BASOPHILS 0.0 0.0 - 0.1 K/UL    DF AUTOMATED     PROTHROMBIN TIME + INR    Collection Time: 09/28/21  3:50 AM   Result Value Ref Range    Prothrombin time 14.5 11.5 - 15.2 sec    INR 1.1 0.8 - 1.2     PTT    Collection Time: 09/28/21  3:50 AM   Result Value Ref Range    aPTT 22.8 (L) 23.0 - 36.4 SEC       Impression:     · Patient with retroperitoneal bleeding most likely secondary to vascular injury during the surgery which went unnoticed because of no bleeding into the field. He does have a history of a stable abdominal aortic aneurysm and atherosclerosis.     Plan:     · Standing dose Tylenol for pain with as needed oxycodone  · Continue home meds  · Sliding scale insulin for blood sugar control  · Serial CBCs every 6 hours  · Bed rest    Signed By: Zaria Deng MD     September 28, 2021

## 2021-09-28 NOTE — DIABETES MGMT
Diabetes Plan of Care    9/28: Noted patient in ED POD# 1 robotic assisted laparoscopic left inguinal hernia repair with mesh (9/27/2021)  Home diabetes medications:   Xigduo (dapagliflozin-metformin) 5-1000 mg everyday    Glycemic assessment: Hyperglycemia    POC BG 9/27: 209, 157  Lab BG 9/28: 219    History: T2DM with A1c of 7.1 (9/27/2021)  Morning blood glucose: None. Order obtained for  Correctional insulin starting before lunch 9/28/2021  IVF containing dextrose: None. Steroids: one time dose dexamethason 12 mg on 9/27/2021    Recommendations:  1.) hold oral diabetes medication, Metformin, while patient is in house. Order obtained. 2.) Resume pre admission diabetes medication at disch.  3.) correctional lispro insulin ACHS while patient is in house. 4.) modify clear liquid diet by adding 4 carb choices and no concentrated sweets. Done. Most recent blood glucose values: Within target range (non-ICU: <140; ICU<180):  No.    Current A1c 7.1% (9/27/2021) which is equivalent to estimated average blood glucose of 157 mg/dL during the past 2-3 months    Adequate glycemic control PTA:  Yes. Current insulin regimen:  Correctional lispro insulin ACHS.  Normal sensitivity dose    TDD previous day 9/27:  Lispro: 9 units    Diet: clear liquid; 4 carb choices, no concentrated sweets    Goals: Blood glucose will be within target of 70 - 180 mg/dl by: 10/01/2021    Education:  _____ Refer to Diabetes Education Record                       _X____ Education not indicated at this time     Pau Claudior, MEHDI [facility]  Pager: [phone]

## 2021-09-28 NOTE — ROUTINE PROCESS
TRANSFER - OUT REPORT:    Verbal report given to [facility] EMERGENCY DEPARTMENT AT [facility], RN(name) on Milli Mason  being transferred to [facility] ED(unit) for routine progression of care       Report consisted of patients Situation, Background, Assessment and   Recommendations(SBAR). Information from the following report(s) ED Summary was reviewed with the receiving nurse. Lines:   Peripheral IV 09/27/21 Anterior;Right Forearm (Active)        Opportunity for questions and clarification was provided.       Patient transported with:   Monitor

## 2021-09-28 NOTE — ED NOTES
Resting quietly. No distress noted. Respirations equal and unlabored. Skins warm, dry, acyanotic. Arouses easily to verbal stimuli. Denies any disicomforts at this time.  Lights dimmed  Will continue to [facility]

## 2021-09-28 NOTE — ED NOTES
Patient resting comfortably on stretcher, denies any new complaints at this time. Vital signs are stable. Will continue to closely monitor patient.

## 2021-09-28 NOTE — ED PROVIDER NOTES
Patient 65-year-old male with history of coronary artery disease stent, stent x1, diabetes, hypercholesteremia, and recent inguinal hernia repair performed earlier today by Dr. Patric Hinkle at [facility].  Patient had tolerated the procedure well and had successfully made it home but when he attempted to stand out of the car he had a syncopal episode and fell to the ground. Patient denies any associated trauma, no head pain, neck pain, chest pain, or any new abdominal pain that was not present before the fall. Per EMS patient demonstrated orthostatic vital signs with a heart rate that went from 80 while sitting to 120 upon standing with a concomitant drop of approximately 30 mmHg of his blood pressure. Blood glucose was 243 mg/dL.            Past Medical History:   Diagnosis Date    CAD (coronary artery disease) 2011    coronary artery stent    Diabetes ([address]     Hypercholesterolemia     Personal history of tobacco use, presenting hazards to health        Past Surgical History:   Procedure Laterality Date    COLONOSCOPY N/A 6/7/2017    COLONOSCOPY / polypectomy performed by Ines Faust MD at [facility] ENDOSCOPY    HX [address]    broken nose    [place] CARDIAC SURG PROCEDURE UNLIST  11/04/2011    stent    [place] COLSC FLX W/RMVL OF TUMOR POLYP LESION SNARE TQ  12/16/15    Dr. Hans Michael f/u 2016    STENT INSERTION           Family History:   Problem Relation Age of Onset    Cancer Mother     Cancer Maternal Uncle     Hypertension Father     Cancer Father         colon/rectal CA [de-identified] yo    Colon Cancer Father     Colon Polyps Father     Heart Disease Other         Positive family history of heart disease       Social History     Socioeconomic History    Marital status:      Spouse name: Not on file    Number of children: Not on file    Years of education: Not on file    Highest education level: Not on file   Occupational History    Not on file   Tobacco Use    Smoking status: Former Smoker     Quit date: 2012     Years since quittin.8    Smokeless tobacco: Never Used   Vaping Use    Vaping Use: Never used   Substance and Sexual Activity    Alcohol use: Yes     Comment: Beer in summer    Drug use: Never    Sexual activity: Yes     Partners: Female   Other Topics Concern    Not on file   Social History Narrative    Not on file     Social Determinants of Health     Financial Resource Strain:     Difficulty of Paying Living Expenses:    Food Insecurity:     Worried About Running Out of Food in the Last Year:     [address] in the Last Year:    Transportation Needs:     Lack of Transportation (Medical):  Lack of Transportation (Non-Medical):    Physical Activity:     Days of Exercise per Week:     Minutes of Exercise per Session:    Stress:     Feeling of Stress :    Social Connections:     Frequency of Communication with Friends and Family:     Frequency of Social Gatherings with Friends and Family:     Attends [employer] Services:     Active Member of Clubs or Organizations:     Attends Club or Organization Meetings:     Marital Status:    Intimate Partner Violence:     Fear of Current or Ex-Partner:     Emotionally Abused:     Physically Abused:     Sexually Abused: ALLERGIES: Patient has no known allergies. Review of Systems   Constitutional: Negative for chills and fever. HENT: Negative for rhinorrhea and sore throat. Eyes: Negative for discharge and redness. Respiratory: Negative for cough and shortness of breath. Cardiovascular: Negative for chest pain and leg swelling. Gastrointestinal: Negative for abdominal pain, diarrhea, nausea and vomiting. Genitourinary: Negative for difficulty urinating and dysuria. Musculoskeletal: Negative for back pain and neck pain. Skin: Negative for rash and wound. Neurological: Positive for syncope. Negative for light-headedness and headaches.        Vitals:    21 1751 21 1900 09/27/21 1930   BP: 117/75 (!) 141/75 112/89   Pulse: 95     Resp: 17     Temp: 98.3 °F (36.8 °C)     SpO2: 100% 100% 100%   Weight: 75.8 kg (167 lb)     Height: 5' 11\" (1.803 m)              Physical Exam  Constitutional:       General: He is not in acute distress. Appearance: He is not ill-appearing, toxic-appearing or diaphoretic. HENT:      Head: Normocephalic and atraumatic. Right Ear: External ear normal.      Left Ear: External ear normal.      Nose: No congestion or rhinorrhea. Mouth/Throat:      Mouth: Mucous membranes are moist.      Pharynx: No oropharyngeal exudate or posterior oropharyngeal erythema. Eyes:      General:         Right eye: No discharge. Left eye: No discharge. Pupils: Pupils are equal, round, and reactive to light. Neck:      Vascular: No carotid bruit. Cardiovascular:      Rate and Rhythm: Normal rate and regular rhythm. Heart sounds: No murmur heard. No friction rub. No gallop. Pulmonary:      Effort: Pulmonary effort is normal. No respiratory distress. Breath sounds: No stridor. No wheezing, rhonchi or rales. Abdominal:      General: Abdomen is flat. There is no distension. Tenderness: There is no right CVA tenderness, left CVA tenderness, guarding or rebound. Comments: Abdomen is soft, nondistended and no rebound with some tenderness noted around laparoscopic incision sites   Musculoskeletal:         General: No swelling, tenderness, deformity or signs of injury. Cervical back: No rigidity or tenderness. Right lower leg: No edema. Left lower leg: No edema. Lymphadenopathy:      Cervical: No cervical adenopathy. Skin:     General: Skin is warm. Capillary Refill: Capillary refill takes less than 2 seconds. Coloration: Skin is not jaundiced or pale. Findings: No bruising, erythema, lesion or rash. Neurological:      General: No focal deficit present.       Mental Status: He is alert and oriented to person, place, and time. Sensory: No sensory deficit. Motor: No weakness.    Psychiatric:         Mood and Affect: Mood normal.          MDM       Procedures

## 2021-09-28 NOTE — DISCHARGE SUMMARY
[place] [id number] [facility]  Evaristo Carbajal MD, [facility]  General Surgery  Discharge Summary     Patient ID:  Sonia Ivy  [id number]  63 y.o.  1960    Admit Date: 9/27/2021    Discharge Date: 9/28/2021    Admission Diagnoses: Retroperitoneal bleed [R58]    Discharge Diagnoses:    Problem List as of 9/28/2021 Date Reviewed: 9/1/2021        Codes Class Noted - Resolved    Left inguinal hernia ICD-10-CM: K40.90  ICD-9-CM: 550.90  9/27/2021 - Present        Retroperitoneal bleed ICD-10-CM: R58  ICD-9-CM: 459.0  9/27/2021 - Present        Hyperchloremia ICD-10-CM: E87.8  ICD-9-CM: 276.9  2/11/2019 - Present        Chest pain, unspecified ICD-10-CM: R07.9  ICD-9-CM: 786.50  Unknown - Present    Overview Signed 7/30/2013 12:44 PM by Rome Fothergill A     Abnormal nuclear scan             Personal history of tobacco use, presenting hazards to health ICD-10-CM: Z87.891  ICD-9-CM: V15.82  Unknown - Present        CAD (coronary artery disease) ICD-10-CM: I25.10  ICD-9-CM: 414.00  3/12/2013 - Present        Hypercholesteremia ICD-10-CM: E78.00  ICD-9-CM: 272.0  10/8/2012 - Present        DM2 (diabetes mellitus, type 2) ([facility] ICD-10-CM: E11.9  ICD-9-CM: 250.00  1/16/2012 - Present        RESOLVED: Palpitations ICD-10-CM: R00.2  ICD-9-CM: 785.1  Unknown - 8/9/2018               Admission Condition: [address]    Discharge Condition: Good    Last Procedure:     Hospital Course:   Pt admitted with syncope and anemia of acute blood loss after robot assisted laparoscopic left inguinal hernia repair. Hemoglobin is stable at 8.4. CTA abd/pelvis shows no active bleeding. The patient is clinically stable with vital signs stable.      Consults: Diabetes manager    Significant Diagnostic Studies: radiology: CT scan: CTA    Disposition: home    Patient Instructions:   Current Discharge Medication List      CONTINUE these medications which have NOT CHANGED    Details   Xigduo XR 5-1,000 mg TBph TAKE 1 TABLET BY MOUTH EVERY DAY  Qty: 90 Tablet, Refills: 0    Associated Diagnoses: Type 2 diabetes mellitus without complication, without long-term current use of insulin (HCC)      metoprolol succinate (TOPROL-XL) 50 mg XL tablet TAKE 1 TABLET BY MOUTH EVERY DAY  Qty: 90 Tablet, Refills: 0    Associated Diagnoses: Essential hypertension      rosuvastatin (CRESTOR) 20 mg tablet TAKE 1 TABLET BY MOUTH EVERY DAY  Qty: 90 Tablet, Refills: 0    Associated Diagnoses: Mixed hyperlipidemia      oxyCODONE IR (ROXICODONE) 5 mg immediate release tablet Take 1 Tablet by mouth every four (4) hours as needed for Pain for up to 3 days. Max Daily Amount: 30 mg.  Qty: 20 Tablet, Refills: 0    Associated Diagnoses: Postoperative pain      acetaminophen (TYLENOL) 325 mg tablet Take 2 Tablets by mouth every six (6) hours. Indications: pain  Qty: 42 Tablet, Refills: 0      docusate sodium (COLACE) 100 mg capsule Take 1 Capsule by mouth two (2) times a day for 90 days. Qty: 60 Capsule, Refills: 2      bisacodyL 5 mg tab Take 5 mg by mouth daily. Qty: 3 Tablet, Refills: 0      fenofibrate nanocrystallized (TRICOR) 48 mg tablet TAKE 1 TABLET BY MOUTH EVERY DAY  Qty: 15 Tab, Refills: 0    Comments: No refills until Pt is seen and fasting labs are done. Associated Diagnoses: Mixed hyperlipidemia      ONETOUCH ULTRA TEST strip TEST ONCE IN THE AM FASTING, THEN 2 HRS AFTER DINNER. DISP: 180 STRIPS. Qty: 180 Strip, Refills: 2      Blood-Glucose Meter (ONE TOUCH ULTRAMINI) monitoring kit by Does Not Apply route. Test 1 am fasting then 2 hrs after dinner  Qty: 1 Kit, Refills: 0    Associated Diagnoses: Diabetes type 2, uncontrolled ([address]      Lancets (ONE [address]) Misc by Does Not Apply route. Test once in the AM fasting, then 2 hrs after dinner.   Disp:  A box for 180  Qty: 1 Package, Refills: 11    Associated Diagnoses: Diabetes type 2, uncontrolled ([address]         STOP taking these medications       ibuprofen (MOTRIN) 600 mg tablet Comments:   Reason for Stopping:         aspirin delayed-release 81 mg tablet Comments:   Reason for Stopping:             Activity: See surgical instructions  Diet: Low fat, Low cholesterol  Wound Care: As directed    Follow-up with Dr. Bibiana Luciano in 2 weeks.   Follow-up tests/labs None    Signed:  Jessie Lau MD  9/28/2021  6:47 PM

## 2021-09-28 NOTE — ED NOTES
Report given to oncoming shift RN Fahad Palmer), including  all care previously given. Addressed all questions asked by oncoming RN.

## 2021-09-28 NOTE — ED NOTES
Patient continues to rest comfortably on stretcher, states pain score is still 6/10 but doesn't want any pain medication. Will continue to monitor patient.

## 2021-09-28 NOTE — DISCHARGE INSTRUCTIONS
[facility]  Brendan Vega MD, FACS  General Surgery    Pt may remove the dressing and shower in two days. Please use your wash clothe to wash the incision with soap and water once the dressings have been removed  No strenuous activity. Bed rest with bathroom and kitchen privileges for 3 days. No driving or operating heavy machinery while on narcotic pain medications. Please apply an ice pack to the operative site for 30 minutes 3 times daily to help reduce pain and swelling and the need for narcotic pain medication. No strenuous activity or contact sports for two weeks. No lifting greater than 15 lbs for 2 weeks. Call MD for any redness, swelling, bleeding or pus at the incision. Also call for any nausea, vomiting, increased pain or pain uncontrolled by pain medicine. Patient Education        Inguinal Hernia Repair Surgery: What to Expect at Home  Your Recovery     After surgery to repair a hernia, you're likely to have pain for a few days. You may also feel tired and have less energy than normal. This is common. You should start to feel better after a few days. And you'll probably feel much better in 7 days. For a few weeks you may feel discomfort or pulling in the groin area when you move. You may have some bruising near the repair site and on your genitals. This is normal.  This care sheet gives you a general idea about how long it will take for you to recover. But each person recovers at a different pace. Follow the steps below to get better as quickly as possible. How can you care for yourself at home? Activity    · Rest when you feel tired.     · You may shower 24 to 48 hours after surgery, if your doctor okays it. Pat the incision dry. Do not take a bath for the first 2 weeks, or until your doctor tells you it is okay.     · Allow the area to heal. Don't move quickly or lift anything heavy until you are feeling better.     · Be active. Walking is a good choice.   · You most likely can return to light activity after 1 to 3 weeks, depending on the type of surgery you had. Diet    · You can eat your normal diet. If your stomach is upset, try bland, low-fat foods like plain rice, broiled chicken, toast, and yogurt.     · If your bowel movements are not regular right after surgery, try to avoid constipation and straining. Drink plenty of water. Your doctor may suggest fiber, a stool softener, or a mild laxative. Medicines    · Be safe with medicines. Read and follow all instructions on the label. ? If the doctor gave you a prescription medicine for pain, take it as prescribed. ? If you are not taking a prescription pain medicine, ask your doctor if you can take an over-the-counter medicine.     · Your doctor will tell you if and when you can restart your medicines. He or she will also give you instructions about taking any new medicines. Incision care    · You will have a dressing over the cut (incision). A dressing helps the cut heal and protects it. Your doctor will tell you how to take care of this.     · If you have skin adhesive on the cut, leave it on until it falls off. Skin adhesive is also called liquid stitches or glue.     · If you have strips of tape on the cut, leave the tape on for a week or until it falls off.     · If you had stitches, your doctor will tell you when to come back to have them removed.     · Wash the area daily with warm, soapy water, and pat it dry. Don't use hydrogen peroxide or alcohol. They can slow healing. Ice    · Put ice or a cold pack on the area for 10 to 20 minutes at a time. Try to do this every 1 to 2 hours for the next 3 days (when you are awake) or until the swelling goes down. Put a thin cloth between the ice and your skin. Follow-up care is a key part of your treatment and safety. Be sure to make and go to all appointments, and call your doctor if you are having problems.  It's also a good idea to know your test results and keep a list of the medicines you take. When should you call for help? Call 911 anytime you think you may need emergency care. For example, call if:    · You passed out (lost consciousness).     · You are short of breath. Call your doctor now or seek immediate medical care if:    · You have pain that does not get better after you take pain medicine.     · You have loose stitches, or your incision comes open.     · Bright red blood has soaked through your bandage.     · You are sick to your stomach or cannot drink fluids.     · You have signs of a blood clot in your leg (called a deep vein thrombosis), such as:  ? Pain in your calf, back of the knee, thigh, or groin. ? Redness and swelling in your leg or groin.     · You cannot pass stools or gas.     · You have symptoms of infection, such as:  ? Increased pain, swelling, warmth, or redness. ? Red streaks leading from the incision. ? Pus draining from the incision. ? A fever. Watch closely for changes in your health, and be sure to contact your doctor if you have any problems. Where can you learn more? Go to http://www.gray.com/  Enter D758 in the search box to learn more about \"Inguinal Hernia Repair Surgery: What to Expect at Home. \"  Current as of: February 10, 2021               Content Version: 13.0  © 7219-7697 [employer]. Care instructions adapted under license by [employer] (which disclaims liability or warranty for this information). If you have questions about a medical condition or this instruction, always ask your healthcare professional. [place] [address] any warranty or liability for your use of this information. Patient Education        Hematoma: Care Instructions  Your Care Instructions     A hematoma is a bad bruise. It happens when an injury causes blood to collect and pool under the skin.  The pooling blood gives the skin a spongy, rubbery, lumpy feel.  A hematoma usually is not a cause for concern. It is not the same thing as a blood clot in a vein, and it does not cause blood clots. Follow-up care is a key part of your treatment and safety. Be sure to make and go to all appointments, and call your doctor if you are having problems. It's also a good idea to know your test results and keep a list of the medicines you take. How can you care for yourself at home? · Rest and protect the bruised area. · Put ice or a cold pack on the area for 10 to 20 minutes at a time. · Prop up the bruised area on a pillow when you ice it or anytime you sit or lie down during the next 3 days. Try to keep it above the level of your heart. This will help reduce swelling. · Wrapping the bruised area with an elastic bandage such as an Ace wrap will help decrease swelling. Don't wrap it too tightly, as this can cause more swelling below the affected area. · Be safe with medicines. Read and follow all instructions on the label. ? If the doctor gave you a prescription medicine for pain, take it as prescribed. ? If you are not taking a prescription pain medicine, ask your doctor if you can take an over-the-counter medicine. · Do not take two or more pain medicines at the same time unless the doctor told you to. Many pain medicines have acetaminophen, which is Tylenol. Too much acetaminophen (Tylenol) can be harmful. When should you call for help? Call your doctor now or seek immediate medical care if:    · You have signs of skin infection, such as:  ? Increased pain, swelling, warmth, or redness. ? Red streaks leading from the area. ? Pus draining from the area. ? A fever. Watch closely for changes in your health, and be sure to contact your doctor if:    · The bruise lasts longer than 4 weeks.     · The bruise gets bigger or becomes more painful.     · You do not get better as expected. Where can you learn more?   Go to http://www.gray.com/  Enter P911 in the search box to learn more about \"Hematoma: Care Instructions. \"  Current as of: July 1, 2021               Content Version: 13.0  © 3149-4644 [employer]. Care instructions adapted under license by [employer] (which disclaims liability or warranty for this information). If you have questions about a medical condition or this instruction, always ask your healthcare professional. [place] [address] any warranty or liability for your use of this information. Patient Education        Abdominal Aortic Aneurysm: Care Instructions  Your Care Instructions  An abdominal aortic aneurysm is a stretched and bulging area of the aorta. The aorta is the large blood vessel that takes oxygen-rich blood from the heart to the rest of the body. This type of aneurysm is in the belly, where the aorta takes blood to the lower body. If an aneurysm gets too big, it can cause serious problems. A bulging aorta is weak and can burst, or rupture. This causes life-threatening bleeding. If your doctor has determined that your aneurysm is small and not growing fast, it is safe to watch the aneurysm carefully and wait on surgery. If the aneurysm is larger, surgery may be the safest choice. In some cases, your doctor may be able to put in a type of graft, called a stent, to fix the aneurysm without doing major surgery. Follow-up care is a key part of your treatment and safety. Be sure to make and go to all appointments, and call your doctor if you are having problems. It's also a good idea to know your test results and keep a list of the medicines you take. How can you care for yourself at home? · Take your medicines exactly as prescribed. Call your doctor if you think you are having a problem with your medicine. You may take medicine to help lower blood pressure and cholesterol. · Follow a heart-healthy diet.   ? Eat lots of fruits and vegetables, whole grains, and low-fat or nonfat dairy foods. ? Eat lean proteins, such as seafood, lean meats and poultry, eggs, beans, peas, nuts, seeds, and soy products. Limit saturated fat and avoid trans fat. ? Limit processed food, sodium, alcohol, and sweets. · Ask your doctor what type and level of activity is safe for you. If your doctor recommends it, get regular exercise. Walking is a good choice. Bit by bit, increase the amount you walk every day. Try for at least 30 minutes on most days of the week. You also may want to swim, bike, or do other activities. · Stay at a healthy weight. Lose weight if you need to. · Do not smoke or allow others to smoke around you. Smoking can make your condition worse. If you need help quitting, talk to your doctor about stop-smoking programs and medicines. These can increase your chances of quitting for good. · Manage other health problems. These include diabetes, high blood pressure, and high cholesterol. If you think you may have a problem with alcohol or drug use, talk to your doctor. When should you call for help? Call 911 anytime you think you may need emergency care. For example, call if:    · You have severe pain in your belly, back, or chest.     · You passed out (lost consciousness).     · You have severe trouble breathing. Call your doctor now or seek immediate medical care if:    · You are dizzy or lightheaded, or you feel like you may faint.     · One or both feet change color, are painful, feel cool, or burn or tingle. Watch closely for changes in your health, and be sure to contact your doctor if you have any problems. Where can you learn more? Go to http://www.gray.com/  Enter S471 in the search box to learn more about \"Abdominal Aortic Aneurysm: Care Instructions. \"  Current as of: July 6, 2021               Content Version: 13.0  © 4629-7186 [employer], [employer].    Care instructions adapted under license by [employer] (which disclaims liability or warranty for this information). If you have questions about a medical condition or this instruction, always ask your healthcare professional. [address] any warranty or liability for your use of this information.

## 2021-09-28 NOTE — ED NOTES
Assuming care of patient, patient A/O x 4, resting comfortably on stretcher. Patient resting comfortably on stretcher, receiving IV fluids as ordered by provider. Dressings noted to incision site, no bleeding noted. Patient complaining of pain 7/10 to abdomen and groin. Patient offered pain medication, states he will take some \"later\". Patient made aware to call on call bell when he wants pain medication, verbalized understanding. No acute distress noted.

## 2021-09-29 ENCOUNTER — PATIENT OUTREACH (OUTPATIENT)
Dept: CASE MANAGEMENT | Age: 61
End: 2021-09-29

## 2021-09-29 NOTE — PROGRESS NOTES
Care Transitions Initial Call    Call within 2 business days of discharge: Yes     Patient: Keri Dugan Patient : 1960 MRN: [id number]    [facility]       Complaint Diagnosis Description Type Department Provider    21 Syncope Retroperitoneal hematoma ED (DISCHARGE) RADHA Matt MD; Carlos Blood, ... 21  Postoperative pain Admission (Discharged) Ligia Cazares MD          Was this an external facility discharge? No Discharge Facility: N/A    Challenges to be reviewed by the provider   Additional needs identified to be addressed with provider: yes  medications- Per patient he was instructed to HOLD his Venkat Haresh based on CT results. Unable to find info to support holding of this medication. This is the only diabetic medication takes to control DM. Method of communication with provider : none    Discussed COVID-19 related testing which was not done at this time. Test results were not done. Patient informed of results, if available? N/A     Advance Care Planning:   Does patient have an Advance Directive:  not on file    Inpatient Readmission Risk score: Unplanned Readmit Risk Score: 13    Was this a readmission? yes   Patient stated reason for the admission: syncope/fall    Patients top risk factors for readmission: medical condition-inguinal hernia repair   Interventions to address risk factors: Scheduled appointment with PCP-THANG appt request sent and Obtained and reviewed discharge summary and/or continuity of care documents    Care Transition Nurse (CTN) contacted the patient by telephone to perform post hospital discharge assessment. Verified name and  with patient as identifiers. Provided introduction to self, and explanation of the CTN role. Wife reported bruising to abdomen which is expected due to nature of surgery. Patient also reported bruising to arms as well.  Advised patient to report any new bruising or lumps to abdominal area.     Patient reported stomach area is extremely sore; 6-7/10. Alternating between Roxicodone and Tylenol every 4 hrs brings pain down to where it is tolerable but unable to rate goal. Patient has not started taking Colace or bisacodyl. Advised to start today to prevent constipation caused by narcotic pain med. Patient advised to follow low fat/low cholesterol diet. CTN reviewed discharge instructions, medical action plan and red flags with patient who verbalized understanding. Were discharge instructions available to patient? yes. Reviewed appropriate site of care based on symptoms and resources available to patient including: PCP, Specialist and CTN. Patient given an opportunity to ask questions and does not have any further questions or concerns at this time. The patient agrees to contact the PCP office for questions related to their healthcare. Medication reconciliation was performed with patient, who verbalizes understanding of administration of home medications. Advised obtaining a 90-day supply of all daily and as-needed medications. Referral to Pharm D needed: no     Patient aware he is not to take ASA or ibupofen. Covid Risk Education  Educated patient about risk for severe COVID-19 due to risk factors according to CDC guidelines. CTN reviewed discharge instructions, medical action plan and red flag symptoms with the patient who verbalized understanding. Discussed COVID vaccination status: no. Education provided on COVID-19 vaccination as appropriate. Discussed exposure protocols and quarantine with CDC Guidelines. Patient was given an opportunity to verbalize any questions and concerns and agrees to contact CTN or health care provider for questions related to their healthcare. Discussed follow-up appointments. If no appointment was previously scheduled, appointment scheduling offered: yes. Is follow up appointment scheduled within 7 days of discharge?  D.   [facility] follow up appointment(s):   Future Appointments   Date Time Provider Darren Merceri   10/13/2021  1:30 PM Ana Montemayor MD [facility] BS AMB   11/8/2021  9:30 AM Tona Estrada NP [facility] BS AMB     Non-BS follow up appointment(s): None    Plan for follow-up call in 7-10 days based on severity of symptoms and risk factors. Plan for next call: symptom management-assess for s/s of infection/hematoma, follow up appointment-verify scheduling of THANG appt and medication management-verify patient taking Colace and bisacodyl  CTN provided contact information for future needs. Goals Addressed                 This Visit's Progress     Attends follow-up appointments as directed. Goal: Patient will attend all appointments scheduled within the next 30 days. Ensure provider appt is scheduled within 7 business days post-discharge; 9/29: THANG appt request sent. Patient aware of surgical follow up appt   Confirm patient attended post-discharge provider appt   Complete post-visit call to confirm attendance and update care needs           Prevent complications post hospitalization. Goal: No admissions post 30 days from discharge of 9/28/21. Review/educate common or potential \"red flags\" of condition worsening; 9/29: Reviewed/educated on s/s of hematoma to monitor/report: new bruising, lump under at or near incisions, increased redness, pain or swelling. Also advised to report: N/V, SOB, CP, fever  Evaluate adherence to medications and priority barriers to resolve; 9/29: patient reported he has not had a BM since d/c. Has not started stool softener or laxative since d/c. Advised patient to start taking Colace and bisacodyl today to help prevent constipation. Instruct on adherence to medications as ordered and assess for therapeutic response and side-effects             Discuss and provide resources for ACP; 9/29: Not on file; will address at a later time.

## 2021-10-01 LAB
ABO + RH BLD: NORMAL
BLD PROD TYP BPU: NORMAL
BLOOD GROUP ANTIBODIES SERPL: NORMAL
BPU ID: NORMAL
CALLED TO:,BCALL1: NORMAL
CROSSMATCH RESULT,%XM: NORMAL
SPECIMEN EXP DATE BLD: NORMAL
STATUS OF UNIT,%ST: NORMAL
UNIT DIVISION, %UDIV: 0

## 2021-10-04 ENCOUNTER — HOSPITAL ENCOUNTER (OUTPATIENT)
Dept: CT IMAGING | Age: 61
Discharge: HOME | End: 2021-10-04
Attending: SURGERY
Payer: COMMERCIAL

## 2021-10-04 ENCOUNTER — HOSPITAL ENCOUNTER (OUTPATIENT)
Dept: LAB | Age: 61
Discharge: HOME | End: 2021-10-04
Payer: COMMERCIAL

## 2021-10-04 DIAGNOSIS — R58 RETROPERITONEAL HEMORRHAGE: Primary | ICD-10-CM

## 2021-10-04 DIAGNOSIS — R58 RETROPERITONEAL HEMORRHAGE: ICD-10-CM

## 2021-10-04 LAB
BASOPHILS # BLD: 0.1 K/UL (ref 0–0.1)
BASOPHILS NFR BLD: 1 % (ref 0–2)
CREAT UR-MCNC: 1 MG/DL (ref 0.6–1.3)
DIFFERENTIAL METHOD BLD: ABNORMAL
EOSINOPHIL # BLD: 0.2 K/UL (ref 0–0.4)
EOSINOPHIL NFR BLD: 2 % (ref 0–5)
ERYTHROCYTE [DISTWIDTH] IN BLOOD BY AUTOMATED COUNT: 12.5 % (ref 11.6–14.5)
HCT VFR BLD AUTO: 32.5 % (ref 36–48)
HGB BLD-MCNC: 10.9 G/DL (ref 13–16)
LYMPHOCYTES # BLD: 1.6 K/UL (ref 0.9–3.6)
LYMPHOCYTES NFR BLD: 19 % (ref 21–52)
MCH RBC QN AUTO: 31.2 PG (ref 24–34)
MCHC RBC AUTO-ENTMCNC: 33.5 G/DL (ref 31–37)
MCV RBC AUTO: 93.1 FL (ref 78–100)
MONOCYTES # BLD: 1.5 K/UL (ref 0.05–1.2)
MONOCYTES NFR BLD: 17 % (ref 3–10)
NEUTS SEG # BLD: 5.1 K/UL (ref 1.8–8)
NEUTS SEG NFR BLD: 60 % (ref 40–73)
PLATELET # BLD AUTO: 316 K/UL (ref 135–420)
PMV BLD AUTO: 10.4 FL (ref 9.2–11.8)
RBC # BLD AUTO: 3.49 M/UL (ref 4.35–5.65)
WBC # BLD AUTO: 8.6 K/UL (ref 4.6–13.2)

## 2021-10-04 PROCEDURE — 74011000636 HC RX REV CODE- 636: Performed by: SURGERY

## 2021-10-04 PROCEDURE — 74177 CT ABD & PELVIS W/CONTRAST: CPT

## 2021-10-04 PROCEDURE — 82565 ASSAY OF CREATININE: CPT

## 2021-10-04 PROCEDURE — 85025 COMPLETE CBC W/AUTO DIFF WBC: CPT

## 2021-10-04 PROCEDURE — 36415 COLL VENOUS BLD VENIPUNCTURE: CPT

## 2021-10-04 RX ADMIN — IOPAMIDOL 99 ML: 612 INJECTION, SOLUTION INTRAVENOUS at 16:35

## 2021-10-05 DIAGNOSIS — G89.18 POSTOPERATIVE PAIN: Primary | ICD-10-CM

## 2021-10-05 RX ORDER — OXYCODONE HYDROCHLORIDE 5 MG/1
5 TABLET ORAL
Qty: 20 TABLET | Refills: 0 | Status: SHIPPED | OUTPATIENT
Start: 2021-10-05 | End: 2021-10-08

## 2021-10-07 ENCOUNTER — OFFICE VISIT (OUTPATIENT)
Dept: FAMILY MEDICINE CLINIC | Age: 61
End: 2021-10-07
Payer: COMMERCIAL

## 2021-10-07 VITALS
OXYGEN SATURATION: 100 % | WEIGHT: 158 LBS | SYSTOLIC BLOOD PRESSURE: 104 MMHG | RESPIRATION RATE: 18 BRPM | BODY MASS INDEX: 22.12 KG/M2 | HEIGHT: 71 IN | DIASTOLIC BLOOD PRESSURE: 63 MMHG | TEMPERATURE: 97.5 F | HEART RATE: 80 BPM

## 2021-10-07 DIAGNOSIS — Z87.19 H/O LEFT INGUINAL HERNIA REPAIR: ICD-10-CM

## 2021-10-07 DIAGNOSIS — Z98.890 H/O LEFT INGUINAL HERNIA REPAIR: ICD-10-CM

## 2021-10-07 DIAGNOSIS — Z09 HOSPITAL DISCHARGE FOLLOW-UP: ICD-10-CM

## 2021-10-07 DIAGNOSIS — G47.00 INSOMNIA, UNSPECIFIED TYPE: ICD-10-CM

## 2021-10-07 DIAGNOSIS — R58 RETROPERITONEAL BLEED: Primary | ICD-10-CM

## 2021-10-07 PROCEDURE — 99214 OFFICE O/P EST MOD 30 MIN: CPT | Performed by: NURSE PRACTITIONER

## 2021-10-07 PROCEDURE — 1111F DSCHRG MED/CURRENT MED MERGE: CPT | Performed by: NURSE PRACTITIONER

## 2021-10-07 RX ORDER — TRAZODONE HYDROCHLORIDE 50 MG/1
50 TABLET ORAL
Qty: 30 TABLET | Refills: 2 | Status: SHIPPED | OUTPATIENT
Start: 2021-10-07 | End: 2022-01-05 | Stop reason: SDUPTHER

## 2021-10-07 NOTE — LETTER
NOTIFICATION RETURN TO WORK    10/7/2021 1:18 PM    [address] [id number]      To Whom It May Concern:    Alonso [facility]ir[facility]. He will return to work on: 10/14/2021    If there are questions or concerns please have the patient contact our office.         Sincerely,      Ryan Vogt NP

## 2021-10-07 NOTE — PROGRESS NOTES
Willie Nance presents today for No chief complaint on file. Is someone accompanying this pt? Yes, wife    Is the patient using any DME equipment during [address]? no    Depression Screening:  3 most recent PHQ Screens 8/4/2021   Little interest or pleasure in doing things Not at all   Feeling down, depressed, irritable, or hopeless Not at all   Total Score PHQ 2 0       Learning Assessment:  Learning Assessment 4/1/2015   PRIMARY LEARNER Patient   HIGHEST LEVEL OF EDUCATION - PRIMARY LEARNER  SOME COLLEGE   BARRIERS PRIMARY LEARNER NONE   CO-LEARNER CAREGIVER No   PRIMARY LANGUAGE ENGLISH   LEARNER PREFERENCE PRIMARY DEMONSTRATION     READING     LISTENING     VIDEOS   ANSWERED BY patient   RELATIONSHIP SELF       Health Maintenance reviewed and discussed and ordered per Provider. Health Maintenance Due   Topic Date Due    COVID-19 Vaccine (1) Never done    DTaP/Tdap/Td series (1 - Tdap) Never done    Shingrix Vaccine Age 50> (2 of 2) 03/04/2020    MICROALBUMIN Q1  01/08/2021    Flu Vaccine (1) 09/01/2021   . Coordination of Care:  1. Have you been to the ER, urgent care clinic since your last visit? Hospitalized since your last visit? Yes 9- [facility]    2. Have you seen or consulted any other health care providers outside of the [address] [place] [address] since your last visit? Include any pap smears or colon screening.

## 2021-10-07 NOTE — PROGRESS NOTES
Transitional Care Management Progress Note    Patient: Willie Nance  : 1960  PCP: Pennie Blood NP    Date of office visit: 10/7/2021   Date of admission: 21  Date of discharge: 21     Discharge Diagnoses:              Problem List as of 2021 Date Reviewed: 2021         Codes Class Noted - Resolved     Left inguinal hernia ICD-10-CM: K40.90  ICD-9-CM: 550.90   2021 - Present           Retroperitoneal bleed ICD-10-CM: R58  ICD-9-CM: 459.0   2021 - Present       Hospital: [facility]    Call initiated w/i 2 business dates of discharge: Yes   Date of the most recent call to the patient: 2021 12:57 PM        Assessment/Plan:     Diagnoses and all orders for this visit:    1. Retroperitoneal bleed    2. Hospital discharge follow-up  -     [place] DISCHARGE MEDS RECONCILED W/ CURRENT OUTPATIENT MED LIST    3. Insomnia, unspecified type  -     REFERRAL TO NEUROLOGY    4. H/O left inguinal hernia repair    Other orders  -     traZODone (DESYREL) 50 mg tablet; Take 1 Tablet by mouth nightly. Follow-up and Dispositions    · Return for keep previous scheduled appt. Subjective:   Willie Nance is a 63 y.o. male presenting today for follow-up after hospital discharge. This encounter and supporting documentation was reviewed if available. Medication reconciliation was performed today. The main problem requiring admission was Admission Diagnoses: Retroperitoneal bleed [R58]. Complications during admission: none    Admitting symptoms have: improved    Hospital Course:   Pt admitted with syncope and anemia of acute blood loss after robot assisted laparoscopic left inguinal hernia repair. Hemoglobin is stable at 8.4. CTA abd/pelvis shows no active bleeding. The patient is clinically stable with vital signs stable. Patient states he has difficulty sleeping however, sleep has been worse due to the post surgical pain.   Patient has been taking melatonin without improvement. Patient's wife also states patient does snore and describes apneic episodes. Medications marked \"taking\" at this time:  Prior to Admission medications    Medication Sig Start Date End Date Taking? Authorizing Provider   oxyCODONE IR (ROXICODONE) 5 mg immediate release tablet Take 1 Tablet by mouth every four (4) hours as needed for Pain for up to 3 days. Max Daily Amount: 30 mg. 10/5/21 10/8/21  Shana Matos MD   Xigduo XR 5-1,000 mg TBph TAKE 1 TABLET BY MOUTH EVERY DAY  Patient not taking: Reported on 9/29/2021 9/27/21   Alex Solano NP   metoprolol succinate (TOPROL-XL) 50 mg XL tablet TAKE 1 TABLET BY MOUTH EVERY DAY 9/27/21   Alex Solano NP   rosuvastatin (CRESTOR) 20 mg tablet TAKE 1 TABLET BY MOUTH EVERY DAY 9/27/21   Alex Solano NP   acetaminophen (TYLENOL) 325 mg tablet Take 2 Tablets by mouth every six (6) hours. Indications: pain 9/27/21   Shana Matos MD   docusate sodium (COLACE) 100 mg capsule Take 1 Capsule by mouth two (2) times a day for 90 days. 9/27/21 12/26/21  Shana Matos MD   bisacodyL 5 mg tab Take 5 mg by mouth daily. 9/27/21   Shana Matos MD   fenofibrate nanocrystallized (TRICOR) 48 mg tablet TAKE 1 TABLET BY MOUTH EVERY DAY 10/21/19   Palacios, Marten , NP   ONETOUCH ULTRA TEST strip TEST ONCE IN THE AM FASTING, THEN 2 HRS AFTER DINNER. DISP: 180 STRIPS. 2/29/16   Annie Ibarra DO   Blood-Glucose Meter (ONE TOUCH ULTRAMINI) monitoring kit by Does Not Apply route. Test 1 am fasting then 2 hrs after dinner 1/16/12   Annie Ibarra DO   Lancets (Inessa [address]camp[address] 42) Misc by Does Not Apply route. Test once in the AM fasting, then 2 hrs after dinner. Disp:  A box for 180 1/16/12   Annie Ibarra DO        Review of Systems   Constitutional: Negative for chills and fever. Respiratory: Negative for shortness of breath. Cardiovascular: Negative for chest pain and palpitations.    Neurological: Negative for dizziness and headaches. Patient Active Problem List   Diagnosis Code    DM2 (diabetes mellitus, type 2) ([facility] E11.9    Hypercholesteremia E78.00    CAD (coronary artery disease) I25.10    Chest pain, unspecified R07.9    Personal history of tobacco use, presenting hazards to health Z87.891    Hyperchloremia E87.8    Left inguinal hernia K40.90    Retroperitoneal bleed R58     Patient Active Problem List    Diagnosis Date Noted    Left inguinal hernia 09/27/2021    Retroperitoneal bleed 09/27/2021    Hyperchloremia 02/11/2019    Chest pain, unspecified     Personal history of tobacco use, presenting hazards to health     CAD (coronary artery disease) 03/12/2013    Hypercholesteremia 10/08/2012    DM2 (diabetes mellitus, type 2) ([facility] 01/16/2012     Current Outpatient Medications   Medication Sig Dispense Refill    oxyCODONE IR (ROXICODONE) 5 mg immediate release tablet Take 1 Tablet by mouth every four (4) hours as needed for Pain for up to 3 days. Max Daily Amount: 30 mg. 20 Tablet 0    Xigduo XR 5-1,000 mg TBph TAKE 1 TABLET BY MOUTH EVERY DAY (Patient not taking: Reported on 9/29/2021) 90 Tablet 0    metoprolol succinate (TOPROL-XL) 50 mg XL tablet TAKE 1 TABLET BY MOUTH EVERY DAY 90 Tablet 0    rosuvastatin (CRESTOR) 20 mg tablet TAKE 1 TABLET BY MOUTH EVERY DAY 90 Tablet 0    acetaminophen (TYLENOL) 325 mg tablet Take 2 Tablets by mouth every six (6) hours. Indications: pain 42 Tablet 0    docusate sodium (COLACE) 100 mg capsule Take 1 Capsule by mouth two (2) times a day for 90 days. 60 Capsule 2    bisacodyL 5 mg tab Take 5 mg by mouth daily. 3 Tablet 0    fenofibrate nanocrystallized (TRICOR) 48 mg tablet TAKE 1 TABLET BY MOUTH EVERY DAY 15 Tab 0    ONETOUCH ULTRA TEST strip TEST ONCE IN THE AM FASTING, THEN 2 HRS AFTER DINNER. DISP: 180 STRIPS. 180 Strip 2    Blood-Glucose Meter (ONE TOUCH ULTRAMINI) monitoring kit by Does Not Apply route.  Test 1 am fasting then 2 hrs after dinner 1 Kit 0    Lancets (ONE TOUCH DELICA) Misc by Does Not Apply route. Test once in the AM fasting, then 2 hrs after dinner. Disp:  A box for 180 1 Package 11     No Known Allergies  Past Medical History:   Diagnosis Date    CAD (coronary artery disease)     coronary artery stent    Diabetes ([facility]     Hypercholesterolemia     Personal history of tobacco use, presenting hazards to health      Past Surgical History:   Procedure Laterality Date    COLONOSCOPY N/A 2017    COLONOSCOPY / polypectomy performed by Vinh Wild MD at [facility] HX [address]    broken nose    [place] CARDIAC SURG PROCEDURE UNLIST  2011    stent    [place] COLSC FLX W/RMVL OF TUMOR POLYP LESION SNARE TQ  12/16/15    Dr. Clarisa Brothers f/u     STENT INSERTION       Family History   Problem Relation Age of Onset    Cancer Mother     Cancer Maternal Uncle     Hypertension Father     Cancer Father         colon/rectal CA [de-identified] yo    Colon Cancer Father     Colon Polyps Father     Heart Disease Other         Positive family history of heart disease     Social History     Tobacco Use    Smoking status: Former Smoker     Quit date: 2012     Years since quittin.8    Smokeless tobacco: Never Used   Substance Use Topics    Alcohol use: Yes     Comment: Beer in summer      Objective:   /63 (BP 1 Location: Left arm, BP Patient Position: Sitting, BP Cuff Size: Adult)   Pulse 80   Temp 97.5 °F (36.4 °C) (Temporal)   Resp 18   Ht 5' 11\" (1.803 m)   Wt 158 lb (71.7 kg)   BMI 22.04 kg/m²      Physical Exam  Constitutional:       Appearance: He is well-developed. HENT:      Head: Normocephalic and atraumatic. Cardiovascular:      Rate and Rhythm: Normal rate and regular rhythm. Heart sounds: Normal heart sounds. No murmur heard. No friction rub. No gallop. Pulmonary:      Effort: Pulmonary effort is normal.      Breath sounds: Normal breath sounds.  No wheezing, rhonchi or rales. Abdominal:      General: Bowel sounds are normal.      Palpations: Abdomen is soft. Tenderness: There is no abdominal tenderness. Musculoskeletal:      Cervical back: Normal range of motion and neck supple. Skin:     General: Skin is warm and dry. Neurological:      Mental Status: He is alert and oriented to person, place, and time. We discussed the expected course, resolution and complications of the diagnosis(es) in detail. Medication risks, benefits, costs, interactions, and alternatives were discussed as indicated. I advised him to contact the office if his condition worsens, changes or fails to improve as anticipated. He expressed understanding with the diagnosis(es) and plan.      Watson Carrel, NP

## 2021-10-07 NOTE — PATIENT INSTRUCTIONS
Insomnia: Care Instructions  Overview     Insomnia is the inability to sleep well. Insomnia may make it hard for you to get to sleep, stay asleep, or sleep as long as you need to. This can make you tired and grouchy during the day. It can also make you forgetful, less effective at work, and unhappy. Insomnia can be linked to many things. These include health problems, medicines, and stressful events. Treatment may include treating problems that may be linked with your insomnia. Treatment also includes behavior and lifestyle changes. This may include cognitive-behavioral therapy for insomnia (CBT-I). CBT-I uses different ways to help you change your thoughts and behaviors that may interfere with sleep. Your doctor can recommend specific things you can try. Examples include doing relaxation exercises, keeping regular bedtimes and wake times, limiting alcohol, and making healthy sleep habits. Some people decide to take medicine for a while to help with sleep. Follow-up care is a key part of your treatment and safety. Be sure to make and go to all appointments, and call your doctor if you are having problems. It's also a good idea to know your test results and keep a list of the medicines you take. How can you care for yourself at home? Cognitive-behavioral therapy for insomnia (CBT-I)  · If your doctor recommends CBT-I, follow your treatment plan. Your doctor will give you instructions that are unique for you. · Your plan will likely include a few things that you can try at home. For example:  ? Try meditation or other relaxation techniques before you go to bed. ? Go to bed at the same time every night, and wake up at the same time every morning. Do not take naps during the day. ? Do not stay in bed awake for too long.  If you can't fall asleep, or if you wake up in the middle of the night and can't get back to sleep within about 15 to 20 minutes, get out of bed and go to another room until you feel sleepy. ? If watching the clock makes you anxious, turn it facing away from you so you cannot see the time. ? If you worry when you lie down, start a worry book. Well before bedtime, write down your worries, and then set the book and your concerns aside. Healthy sleep habits  · If your doctor recommends it, try making healthy sleep habits. For example:  ? Keep your bedroom quiet, dark, and cool. ? Do not have drinks with caffeine, such as coffee or black tea, for 8 hours before bed. ? Do not smoke or use other types of tobacco near bedtime. Nicotine is a stimulant and can keep you awake. ? Avoid drinking alcohol late in the evening, because it can cause you to wake in the middle of the night. ? Do not eat a big meal close to bedtime. If you are hungry, eat a light snack. ? Do not drink a lot of water close to bedtime, because the need to urinate may wake you up during the night. ? Do not read, watch TV, or use your phone in bed. Use the bed only for sleeping and sex. Medicine  · Be safe with medicines. Take your medicines exactly as prescribed. Call your doctor if you think you are having a problem with your medicine. · Talk with your doctor before you try an over-the-counter medicine, herbal product, or supplement to try to improve your sleep. Your doctor can recommend how much to take and when to take it. Make sure your doctor knows all of the medicines, vitamins, herbal products, and supplements you take. · You will get more details on the specific medicines your doctor prescribes. When should you call for help? Watch closely for changes in your health, and be sure to contact your doctor if:    · Your efforts to improve your sleep do not work.     · Your insomnia gets worse.     · You have been feeling down, depressed, or hopeless or have lost interest in things that you usually enjoy. Where can you learn more?   Go to http://www.gray.com/  Enter P513 in the search box to learn more about \"Insomnia: Care Instructions. \"  Current as of: June 16, 2021               Content Version: 13.0  © 1047-9136 [employer]. Care instructions adapted under license by [employer] (which disclaims liability or warranty for this information). If you have questions about a medical condition or this instruction, always ask your healthcare professional. [address] any warranty or liability for your use of this information.

## 2021-10-08 ENCOUNTER — PATIENT OUTREACH (OUTPATIENT)
Dept: CASE MANAGEMENT | Age: 61
End: 2021-10-08

## 2021-10-08 NOTE — PROGRESS NOTES
Care Transitions Follow Up Call    Challenges to be reviewed by the provider   Additional needs identified to be addressed with provider: no  none           Method of communication with provider : none    Care Transition Nurse (CTN) contacted the patient by telephone to follow up after admission on 21. Verified name and  with patient as identifiers. Patient reported some redness and puffiness at/around surgical incisions. Denied warmth or drainage. Patient has 3 surgical incisions. Moving bowels but a little constipated. Can feel when he has to go and feels better after moving bowels. Reported pain to lower abdomen. Laying down makes it better. Sitting up for long period of time or moving around in bed makes it worse. Addressed changes since last contact: none  Follow up appointment completed? yes. Was follow up appointment scheduled within 7 days of discharge? no.     Advance Care Planning:   Does patient have an Advance Directive:  ACP referral placed    CTN reviewed discharge instructions, medical action plan and red flags with patient and discussed any barriers to care and/or understanding of plan of care after discharge. Discussed appropriate site of care based on symptoms and resources available to patient including: PCP, Specialist, When to call 911 and CTN. The patient agrees to contact the PCP office for questions related to their healthcare. Patients top risk factors for readmission: medical condition-inguinal hernia repair   Interventions to address risk factors: Reinforced s/s to monitor and report    [facility] follow up appointment(s):   Future Appointments   Date Time Provider Darren Gifford   10/13/2021  1:30 PM Lorraine Bhardwaj MD [facility] BS AMB   2021  9:30 AM Angie Carmona NP [facility] BS AMB     Non-[facility] follow up appointment(s): none    CTN provided contact information for future needs.  Plan for follow-up call in 10-14 days based on severity of symptoms and risk factors. Plan for next call: symptom management-assess for s/s of infection     Goals Addressed                 This Visit's Progress     Attends follow-up appointments as directed. On track     Goal: Patient will attend all appointments scheduled within the next 30 days. Ensure provider appt is scheduled within 7 business days post-discharge; 9/29: THANG appt request sent. Patient aware of surgical follow up appt   Confirm patient attended post-discharge provider appt ; 10/8; Attended PCP appt 10/7. Complete post-visit call to confirm attendance and update care needs; 10/8: Done.  Prevent complications post hospitalization. On track     Goal: No admissions post 30 days from discharge of 9/28/21. Review/educate common or potential \"red flags\" of condition worsening; 9/29: Reviewed/educated on s/s of hematoma to monitor/report: new bruising, lump under at or near incisions, increased redness, pain or swelling. Also advised to report: N/V, SOB, CP, fever  Evaluate adherence to medications and priority barriers to resolve; 9/29: patient reported he has not had a BM since d/c. Has not started stool softener or laxative since d/c. Advised patient to start taking Colace and bisacodyl today to help prevent constipation. 10/8: Patient moving bowels and taking colace and laxative. Discuss and provide resources for ACP; 9/29: Not on file; will address at a later time.  10/8: ACP referral placed

## 2021-10-12 ENCOUNTER — HOSPITAL ENCOUNTER (EMERGENCY)
Age: 61
Discharge: HOME | End: 2021-10-12
Attending: STUDENT IN AN ORGANIZED HEALTH CARE EDUCATION/TRAINING PROGRAM
Payer: COMMERCIAL

## 2021-10-12 ENCOUNTER — APPOINTMENT (OUTPATIENT)
Dept: CT IMAGING | Age: 61
End: 2021-10-12
Attending: STUDENT IN AN ORGANIZED HEALTH CARE EDUCATION/TRAINING PROGRAM
Payer: COMMERCIAL

## 2021-10-12 ENCOUNTER — PATIENT OUTREACH (OUTPATIENT)
Dept: CASE MANAGEMENT | Age: 61
End: 2021-10-12

## 2021-10-12 VITALS
TEMPERATURE: 97.7 F | SYSTOLIC BLOOD PRESSURE: 100 MMHG | RESPIRATION RATE: 18 BRPM | BODY MASS INDEX: 22.33 KG/M2 | HEART RATE: 83 BPM | DIASTOLIC BLOOD PRESSURE: 65 MMHG | WEIGHT: 156 LBS | OXYGEN SATURATION: 100 % | HEIGHT: 70 IN

## 2021-10-12 DIAGNOSIS — K52.9 PROCTOCOLITIS: ICD-10-CM

## 2021-10-12 DIAGNOSIS — R10.84 ABDOMINAL PAIN, GENERALIZED: Primary | ICD-10-CM

## 2021-10-12 LAB
ABO + RH BLD: NORMAL
ALBUMIN SERPL-MCNC: 3.4 G/DL (ref 3.4–5)
ALBUMIN/GLOB SERPL: 0.9 {RATIO} (ref 0.8–1.7)
ALP SERPL-CCNC: 117 U/L (ref 45–117)
ALT SERPL-CCNC: 38 U/L (ref 16–61)
ANION GAP SERPL CALC-SCNC: 7 MMOL/L (ref 3–18)
APPEARANCE UR: CLEAR
AST SERPL-CCNC: 41 U/L (ref 10–38)
ATRIAL RATE: 77 BPM
BASOPHILS # BLD: 0.1 K/UL (ref 0–0.1)
BASOPHILS NFR BLD: 1 % (ref 0–2)
BILIRUB SERPL-MCNC: 0.7 MG/DL (ref 0.2–1)
BILIRUB UR QL: NEGATIVE
BLOOD GROUP ANTIBODIES SERPL: NORMAL
BUN SERPL-MCNC: 18 MG/DL (ref 7–18)
BUN/CREAT SERPL: 22 (ref 12–20)
CALCIUM SERPL-MCNC: 9.4 MG/DL (ref 8.5–10.1)
CALCULATED P AXIS, ECG09: 53 DEGREES
CALCULATED R AXIS, ECG10: 50 DEGREES
CALCULATED T AXIS, ECG11: 61 DEGREES
CHLORIDE SERPL-SCNC: 105 MMOL/L (ref 100–111)
CO2 SERPL-SCNC: 26 MMOL/L (ref 21–32)
COLOR UR: YELLOW
CREAT SERPL-MCNC: 0.81 MG/DL (ref 0.6–1.3)
DIAGNOSIS, 93000: NORMAL
DIFFERENTIAL METHOD BLD: ABNORMAL
EOSINOPHIL # BLD: 0.3 K/UL (ref 0–0.4)
EOSINOPHIL NFR BLD: 4 % (ref 0–5)
ERYTHROCYTE [DISTWIDTH] IN BLOOD BY AUTOMATED COUNT: 12.5 % (ref 11.6–14.5)
GLOBULIN SER CALC-MCNC: 4 G/DL (ref 2–4)
GLUCOSE SERPL-MCNC: 147 MG/DL (ref 74–99)
GLUCOSE UR STRIP.AUTO-MCNC: >1000 MG/DL
HCT VFR BLD AUTO: 40.9 % (ref 36–48)
HGB BLD-MCNC: 13.7 G/DL (ref 13–16)
HGB UR QL STRIP: NEGATIVE
INR PPP: 0.9 (ref 0.8–1.2)
KETONES UR QL STRIP.AUTO: ABNORMAL MG/DL
LACTATE BLD-SCNC: 2.28 MMOL/L (ref 0.4–2)
LEUKOCYTE ESTERASE UR QL STRIP.AUTO: NEGATIVE
LIPASE SERPL-CCNC: 299 U/L (ref 73–393)
LYMPHOCYTES # BLD: 1.7 K/UL (ref 0.9–3.6)
LYMPHOCYTES NFR BLD: 22 % (ref 21–52)
MCH RBC QN AUTO: 30.7 PG (ref 24–34)
MCHC RBC AUTO-ENTMCNC: 33.5 G/DL (ref 31–37)
MCV RBC AUTO: 91.7 FL (ref 78–100)
MONOCYTES # BLD: 0.7 K/UL (ref 0.05–1.2)
MONOCYTES NFR BLD: 9 % (ref 3–10)
NEUTS SEG # BLD: 4.8 K/UL (ref 1.8–8)
NEUTS SEG NFR BLD: 63 % (ref 40–73)
NITRITE UR QL STRIP.AUTO: NEGATIVE
P-R INTERVAL, ECG05: 124 MS
PH UR STRIP: 5 [PH] (ref 5–8)
PLATELET # BLD AUTO: 430 K/UL (ref 135–420)
PMV BLD AUTO: 10.4 FL (ref 9.2–11.8)
POTASSIUM SERPL-SCNC: 4.4 MMOL/L (ref 3.5–5.5)
PROT SERPL-MCNC: 7.4 G/DL (ref 6.4–8.2)
PROT UR STRIP-MCNC: NEGATIVE MG/DL
PROTHROMBIN TIME: 12.4 SEC (ref 11.5–15.2)
Q-T INTERVAL, ECG07: 386 MS
QRS DURATION, ECG06: 82 MS
QTC CALCULATION (BEZET), ECG08: 436 MS
RBC # BLD AUTO: 4.46 M/UL (ref 4.35–5.65)
SODIUM SERPL-SCNC: 138 MMOL/L (ref 136–145)
SP GR UR REFRACTOMETRY: >1.03 (ref 1–1.03)
SPECIMEN EXP DATE BLD: NORMAL
UROBILINOGEN UR QL STRIP.AUTO: 0.2 EU/DL (ref 0.2–1)
VENTRICULAR RATE, ECG03: 77 BPM
WBC # BLD AUTO: 7.6 K/UL (ref 4.6–13.2)

## 2021-10-12 PROCEDURE — 74174 CTA ABD&PLVS W/CONTRAST: CPT

## 2021-10-12 PROCEDURE — 93005 ELECTROCARDIOGRAM TRACING: CPT

## 2021-10-12 PROCEDURE — 86901 BLOOD TYPING SEROLOGIC RH(D): CPT

## 2021-10-12 PROCEDURE — 96374 THER/PROPH/DIAG INJ IV PUSH: CPT

## 2021-10-12 PROCEDURE — 99285 EMERGENCY DEPT VISIT HI MDM: CPT

## 2021-10-12 PROCEDURE — 81003 URINALYSIS AUTO W/O SCOPE: CPT

## 2021-10-12 PROCEDURE — 83690 ASSAY OF LIPASE: CPT

## 2021-10-12 PROCEDURE — 74011250636 HC RX REV CODE- 250/636: Performed by: STUDENT IN AN ORGANIZED HEALTH CARE EDUCATION/TRAINING PROGRAM

## 2021-10-12 PROCEDURE — 85610 PROTHROMBIN TIME: CPT

## 2021-10-12 PROCEDURE — 85025 COMPLETE CBC W/AUTO DIFF WBC: CPT

## 2021-10-12 PROCEDURE — 74011000636 HC RX REV CODE- 636: Performed by: STUDENT IN AN ORGANIZED HEALTH CARE EDUCATION/TRAINING PROGRAM

## 2021-10-12 PROCEDURE — 80053 COMPREHEN METABOLIC PANEL: CPT

## 2021-10-12 PROCEDURE — 83605 ASSAY OF LACTIC ACID: CPT

## 2021-10-12 PROCEDURE — 96376 TX/PRO/DX INJ SAME DRUG ADON: CPT

## 2021-10-12 RX ORDER — LEVOFLOXACIN 750 MG/1
750 TABLET ORAL DAILY
Qty: 14 TABLET | Refills: 0 | Status: SHIPPED | OUTPATIENT
Start: 2021-10-12 | End: 2021-10-26

## 2021-10-12 RX ORDER — METRONIDAZOLE 500 MG/1
500 TABLET ORAL 2 TIMES DAILY
Qty: 28 TABLET | Refills: 0 | Status: SHIPPED | OUTPATIENT
Start: 2021-10-12 | End: 2021-10-26

## 2021-10-12 RX ORDER — OXYCODONE HYDROCHLORIDE 5 MG/1
5 TABLET ORAL
Qty: 12 TABLET | Refills: 0 | Status: SHIPPED | OUTPATIENT
Start: 2021-10-12 | End: 2021-10-15

## 2021-10-12 RX ORDER — MORPHINE SULFATE 4 MG/ML
4 INJECTION INTRAVENOUS
Status: COMPLETED | OUTPATIENT
Start: 2021-10-12 | End: 2021-10-12

## 2021-10-12 RX ORDER — CIPROFLOXACIN 500 MG/1
500 TABLET ORAL 2 TIMES DAILY
Qty: 28 TABLET | Refills: 0 | Status: SHIPPED | OUTPATIENT
Start: 2021-10-12 | End: 2021-10-12 | Stop reason: ALTCHOICE

## 2021-10-12 RX ADMIN — IOPAMIDOL 100 ML: 755 INJECTION, SOLUTION INTRAVENOUS at 11:08

## 2021-10-12 RX ADMIN — SODIUM CHLORIDE, SODIUM LACTATE, POTASSIUM CHLORIDE, AND CALCIUM CHLORIDE 1000 ML: 600; 310; 30; 20 INJECTION, SOLUTION INTRAVENOUS at 09:59

## 2021-10-12 RX ADMIN — MORPHINE SULFATE 4 MG: 4 INJECTION INTRAVENOUS at 10:31

## 2021-10-12 RX ADMIN — MORPHINE SULFATE 4 MG: 4 INJECTION INTRAVENOUS at 12:24

## 2021-10-12 NOTE — ED NOTES
Pt discharged w/ d/c paperwork, IV removed. Pt confirms understanding of all instructions, no further questions or complaints at this time, ambulates out of ED with wife without difficulty.

## 2021-10-12 NOTE — ED PROVIDER NOTES
Patient is a 61-year-old male with history remarkable for recent inguinal hernia repair (complicated by retroperitoneal hemorrhage), coronary artery disease (stent x1), diabetes, and hypercholesteremia. Patient presents today with primary complaint of worsening of abdominal pain that has been present since discharge from the hospital for his inguinal hernia repair. Patient describes the pain as generalized, sharp, severe, and recently started radiating to his rectum/anus. Patient reports associated lightheadedness, nausea without vomiting and constipation. Patient denies any associated fever/chills, blood in his stool, melena, chest pain, shortness of breath, or syncope.            Past Medical History:   Diagnosis Date    CAD (coronary artery disease)     coronary artery stent    Diabetes ([facility] [address] [facility]     Hypercholesterolemia     Personal history of tobacco use, presenting hazards to health        Past Surgical History:   Procedure Laterality Date    COLONOSCOPY N/A 2017    COLONOSCOPY / polypectomy performed by Damon Sweet MD at [facility] ENDOSCOPY    HX ORTHOPAEDIC  1977    broken nose    [place] CARDIAC SURG PROCEDURE UNLIST  2011    stent    [place] COLSC FLX W/RMVL OF TUMOR POLYP LESION SNARE TQ  12/16/15    Dr. Selena Del Valle f/u     STENT INSERTION           Family History:   Problem Relation Age of Onset    Cancer Mother     Cancer Maternal Uncle     Hypertension Father     Cancer Father         colon/rectal CA [de-identified] yo    Colon Cancer Father     Colon Polyps Father     Heart Disease Other         Positive family history of heart disease       Social History     Socioeconomic History    Marital status:      Spouse name: Not on file    Number of children: Not on file    Years of education: Not on file    Highest education level: Not on file   Occupational History    Not on file   Tobacco Use    Smoking status: Former Smoker     Quit date: 2012     Years since quittin.8    Smokeless tobacco: Never Used   Vaping Use    Vaping Use: Never used   Substance and Sexual Activity    Alcohol use: Yes     Comment: Beer in summer    Drug use: Never    Sexual activity: Yes     Partners: Female   Other Topics Concern    Not on file   Social History Narrative    Not on file     Social Determinants of Health     Financial Resource Strain:     Difficulty of Paying Living Expenses:    Food Insecurity:     Worried About Running Out of Food in the Last Year:     [address] in the Last Year:    Transportation Needs:     Lack of Transportation (Medical):  Lack of Transportation (Non-Medical):    Physical Activity:     Days of Exercise per Week:     Minutes of Exercise per Session:    Stress:     Feeling of Stress :    Social Connections:     Frequency of Communication with Friends and Family:     Frequency of Social Gatherings with Friends and Family:     Attends Oriental orthodox Services:     Active Member of Clubs or Organizations:     Attends Club or Organization Meetings:     Marital Status:    Intimate Partner Violence:     Fear of Current or Ex-Partner:     Emotionally Abused:     Physically Abused:     Sexually Abused: ALLERGIES: Patient has no known allergies. Review of Systems   Constitutional: Negative for chills and fever. HENT: Negative for rhinorrhea and sore throat. Eyes: Negative for discharge and redness. Respiratory: Negative for cough and shortness of breath. Cardiovascular: Negative for chest pain and leg swelling. Gastrointestinal: Positive for abdominal pain, constipation and nausea. Negative for diarrhea and vomiting. Genitourinary: Negative for difficulty urinating and dysuria. Musculoskeletal: Negative for back pain and neck pain. Skin: Negative for rash and wound. Neurological: Positive for light-headedness. Negative for syncope and headaches.        Vitals:    10/12/21 0852   BP: 100/75   Pulse: 80   Resp: 18   Temp: 97.7 °F (36.5 °C)   SpO2: 98%   Weight: 70.8 kg (156 lb)   Height: 5' 10\" (1.778 m)            Physical Exam  Constitutional:       General: He is not in acute distress. Appearance: He is not ill-appearing, toxic-appearing or diaphoretic. HENT:      Head: Normocephalic and atraumatic. Right Ear: External ear normal.      Left Ear: External ear normal.      Nose: No congestion or rhinorrhea. Mouth/Throat:      Mouth: Mucous membranes are moist.      Pharynx: No oropharyngeal exudate or posterior oropharyngeal erythema. Eyes:      General:         Right eye: No discharge. Left eye: No discharge. Pupils: Pupils are equal, round, and reactive to light. Neck:      Vascular: No carotid bruit. Cardiovascular:      Rate and Rhythm: Normal rate and regular rhythm. Heart sounds: No murmur heard. No friction rub. No gallop. Pulmonary:      Effort: Pulmonary effort is normal. No respiratory distress. Breath sounds: No stridor. No wheezing, rhonchi or rales. Chest:      Chest wall: No tenderness. Abdominal:      General: Abdomen is flat. There is no distension. Tenderness: There is generalized abdominal tenderness. There is no guarding or rebound. Musculoskeletal:         General: No swelling, tenderness, deformity or signs of injury. Cervical back: No rigidity or tenderness. Right lower leg: No edema. Left lower leg: No edema. Lymphadenopathy:      Cervical: No cervical adenopathy. Skin:     General: Skin is warm. Capillary Refill: Capillary refill takes less than 2 seconds. Coloration: Skin is not cyanotic, jaundiced or pale. Findings: No bruising, erythema, lesion or rash. Neurological:      General: No focal deficit present. Mental Status: He is alert and oriented to person, place, and time. Sensory: No sensory deficit. Motor: No weakness.    Psychiatric:         Mood and Affect: Mood normal.          [facility]  ED Course as of Oct 12 1342   Tue Oct 12, 2021   1337 This was discussed in detail with patient's surgeon, Dr. Quinn Feliz given that proctocolitis was noted on CT scan with no evidence of extravasation and a or enlargement of his AAA we will proceed with p.o. antibiotics and discharge with close outpatient follow-up.     [JK]      ED Course User Index  [JK] Alex Souza MD       Procedures

## 2021-10-12 NOTE — ED TRIAGE NOTES
Pt to ED with complaints of generalized abdominal pain, states pain switches from right to left.  Reports hernia repair surgery on 9/27/2021

## 2021-10-12 NOTE — DISCHARGE INSTRUCTIONS
As we discussed please complete the entire course of antibiotics as prescribed even if your symptoms begin to improve. Also please keep your already scheduled appointment with your surgeon, Dr. Isabela Robles, tomorrow. If you develop any sudden change in your symptoms including fever/chills, sudden worsening of pain, inability to tolerate any food/drink or any other sudden/severe change in your condition please return immediately to emergency department further evaluation and treatment.

## 2021-10-13 ENCOUNTER — OFFICE VISIT (OUTPATIENT)
Dept: SURGERY | Age: 61
End: 2021-10-13
Payer: COMMERCIAL

## 2021-10-13 VITALS — HEIGHT: 70 IN | TEMPERATURE: 97.2 F | RESPIRATION RATE: 18 BRPM | BODY MASS INDEX: 22.19 KG/M2 | WEIGHT: 155 LBS

## 2021-10-13 DIAGNOSIS — K52.9 COLITIS: Primary | ICD-10-CM

## 2021-10-13 PROCEDURE — 99024 POSTOP FOLLOW-UP VISIT: CPT | Performed by: SURGERY

## 2021-10-13 RX ORDER — LACTOBACILLUS COMBINATION NO.4 3B CELL
1 CAPSULE ORAL DAILY
Qty: 90 CAPSULE | Refills: 0 | Status: SHIPPED | OUTPATIENT
Start: 2021-10-13 | End: 2022-01-07

## 2021-10-13 NOTE — PROGRESS NOTES
[facility]  General Surgery    Name: Ran Ashby MRN: [id number]   : 1960 [facility]   Date: 10/13/2021 Admission Date: No admission date for patient encounter. Subjective:  Patient was seen in the emergency department yesterday for what he describes as acute onset lower abdominal pain. He states that he was sitting at the computer working at about 7 AM when he had acute onset of abdominal pain mostly in the lower abdomen but diffusely. He was seen in the emergency department where remarkable findings were lactic acid of 2.44 and mild colitis possibly seen in the colon. Patient's pain improved in the emergency room. He was discharged home with p.o. antibiotics and pain medication. Objective:  Vitals:    10/13/21 1330   Resp: 18   Temp: 97.2 °F (36.2 °C)   TempSrc: Temporal   Weight: 70.3 kg (155 lb)   Height: 5' 10\" (1.778 m)       Physical Exam:    General: Awake and alert, oriented x4, no apparent distress   Abdomen: abdomen is soft with minimal tenderness. Incision(s) are C/D/I. No   masses, organomegaly or guarding    Current Medications:  Current Outpatient Medications   Medication Sig Dispense Refill    lactobacillus combination no.4 (Probiotic) 3 billion cell cap Take 1 Capsule by mouth daily. 90 Capsule 0    oxyCODONE IR (Roxicodone) 5 mg immediate release tablet Take 1 Tablet by mouth every six (6) hours as needed for Pain for up to 3 days. Max Daily Amount: 20 mg. 12 Tablet 0    metroNIDAZOLE (FlagyL) 500 mg tablet Take 1 Tablet by mouth two (2) times a day for 14 days. 28 Tablet 0    levoFLOXacin (Levaquin) 750 mg tablet Take 1 Tablet by mouth daily for 14 days. 14 Tablet 0    traZODone (DESYREL) 50 mg tablet Take 1 Tablet by mouth nightly.  30 Tablet 2    Xigduo XR 5-1,000 mg TBph TAKE 1 TABLET BY MOUTH EVERY DAY 90 Tablet 0    metoprolol succinate (TOPROL-XL) 50 mg XL tablet TAKE 1 TABLET BY MOUTH EVERY DAY 90 Tablet 0    rosuvastatin (CRESTOR) 20 mg tablet TAKE 1 TABLET BY MOUTH EVERY DAY 90 Tablet 0    docusate sodium (COLACE) 100 mg capsule Take 1 Capsule by mouth two (2) times a day for 90 days. 60 Capsule 2    fenofibrate nanocrystallized (TRICOR) 48 mg tablet TAKE 1 TABLET BY MOUTH EVERY DAY 15 Tab 0    ONETOUCH ULTRA TEST strip TEST ONCE IN THE AM FASTING, THEN 2 HRS AFTER DINNER. DISP: 180 STRIPS. 180 Strip 2    Blood-Glucose Meter (ONE TOUCH ULTRAMINI) monitoring kit by Does Not Apply route. Test 1 am fasting then 2 hrs after dinner 1 Kit 0    Lancets (ONE TOUCH DELICA) Misc by Does Not Apply route. Test once in the AM fasting, then 2 hrs after dinner. Disp:  A box for 180 1 Package 11    acetaminophen (TYLENOL) 325 mg tablet Take 2 Tablets by mouth every six (6) hours. Indications: pain (Patient not taking: Reported on 10/13/2021) 42 Tablet 0       Chart and notes reviewed. Data reviewed. I have evaluated and examined the patient. IMPRESSION:   · Patient with mild colitis possible infectious or inflammatory.       PLAN:/DISCUSION:   · Complete 14-day antibiotic prescription  · Add probiotics prescription sent to pharmacy  · Follow-up in 2 weeks after repeat CT scan        Zaria Deng MD

## 2021-10-13 NOTE — PROGRESS NOTES
Farheen Stock is a 63 y.o. male  Chief Complaint   Patient presents with    Post OP Follow Up     left inguinal hernia repair with mesh 9/27/21

## 2021-10-13 NOTE — PATIENT INSTRUCTIONS
Colitis: Care Instructions  Your Care Instructions  Colitis is the medical term for swelling (inflammation) of the intestine. It can be caused by different things, such as an infection or loss of blood flow in the intestine. Other causes are problems like Crohn's disease or ulcerative colitis. Symptoms may include fever, diarrhea that may be bloody, or belly pain. Sometimes symptoms go away without treatment. But you may need treatment or more tests, such as blood tests or a stool test. Or you may need imaging tests like a CT scan or a colonoscopy. In some cases, the doctor may want to test a sample of tissue from the intestine. This test is called a biopsy. The doctor has checked you carefully, but problems can develop later. If you notice any problems or new symptoms, get medical treatment right away. Follow-up care is a key part of your treatment and safety. Be sure to make and go to all appointments, and call your doctor if you are having problems. It's also a good idea to know your test results and keep a list of the medicines you take. How can you care for yourself at home? · Rest until you feel better. · Your doctor may recommend that you eat bland foods. These include rice, dry toast or crackers, bananas, and applesauce. · To prevent dehydration, drink plenty of fluids. Choose water and other clear liquids until you feel better. If you have kidney, heart, or liver disease and have to limit fluids, talk with your doctor before you increase the amount of fluids you drink. · Be safe with medicines. Take your medicines exactly as prescribed. Call your doctor if you think you are having a problem with your medicine. You will get more details on the specific medicines your doctor prescribes. When should you call for help? Call 911 anytime you think you may need emergency care. For example, call if:    · You passed out (lost consciousness).     · Your stools are maroon or very bloody.    Call your doctor now or seek immediate medical care if:    · You have new or worse belly pain.     · You have a fever.     · You are vomiting.     · You cannot pass stools or gas.     · You have new or more blood in your stools. Watch closely for changes in your health, and be sure to contact your doctor if:    · You have new or worse symptoms.     · You are losing weight.     · You do not get better as expected. Where can you learn more? Go to http://www.gray.com/  Enter [id number] in the search box to learn more about \"Colitis: Care Instructions. \"  Current as of: February 10, 2021               Content Version: 13.0  © 7885-2213 [employer]. Care instructions adapted under license by [employer] (which disclaims liability or warranty for this information). If you have questions about a medical condition or this instruction, always ask your healthcare professional. [address] any warranty or liability for your use of this information.

## 2021-10-21 ENCOUNTER — PATIENT OUTREACH (OUTPATIENT)
Dept: CASE MANAGEMENT | Age: 61
End: 2021-10-21

## 2021-10-21 NOTE — PROGRESS NOTES
Care Transitions Follow Up Call    Challenges to be reviewed by the provider   Additional needs identified to be addressed with provider: no  none           Method of communication with provider : none    Care Transition Nurse (CTN) contacted the patient by telephone to follow up after admission on 21. Verified name and  with patient as identifiers. Patient reported tenderness below belly button and area where hernia was repaired; lower waistline area. Has a lump size of a nickel where he thinks the end of the mesh is. Denied constipation or diarrhea. Stools are green and mushy. Does experience some lightheaded about half way through the day. Appetite has been good. unable to lay on left or right side so having to sleep flat on back. Denied fever, N/V, SOB, chills, sweats. Addressed changes since last contact: medications- now taking Flagy, Levaquin and lactobacillus for proctocolitis  Follow up appointment completed? yes. Was follow up appointment scheduled within 7 days of discharge? yes. Advance Care Planning:   Does patient have an Advance Directive:  ACP referral placed    CTN reviewed discharge instructions, medical action plan and red flags with patient and discussed any barriers to care and/or understanding of plan of care after discharge. Discussed appropriate site of care based on symptoms and resources available to patient including: PCP, Specialist and CTN. The patient agrees to contact the PCP office for questions related to their healthcare.      Patients top risk factors for readmission: medical condition-hernia repair   Interventions to address risk factors: Reinforced s/s to monitor and report    [address] follow up appointment(s):   Future Appointments   Date Time Provider Darren Gifford   2021  9:15 AM Bernhard Oppenheim, MD [facility] BS AMB   2021  9:30 AM Kaleigh Ga NP [facility] BS AMB     Non-BS follow up appointment(s): none    CTN provided contact information for future needs. Plan for follow-up call in 7-10 days based on severity of symptoms and risk factors.   Plan for next call: symptom management-assess for s/s of infection and medication management-adherence to flagyl, levaquin and probiotic     Goals Addressed    None

## 2021-10-27 ENCOUNTER — PATIENT OUTREACH (OUTPATIENT)
Dept: CASE MANAGEMENT | Age: 61
End: 2021-10-27

## 2021-10-27 NOTE — PROGRESS NOTES
Social Work Note  10/27/2021    Date of referral: 10/08/2021  Referral received from: KAROLINE Martinez RN  Reason for referral: Assist the patient with the process and completion of ACP.      MSW contacted the patient and introduced self, role and reason for call. The patient is alert and oriented and engaged in conversation.   The patient verified his name and  as identifiers. The patient reported that he received the ACP information packet mailed to him but he has not been able to take a look at the brochures sent. He shared that he will plan on reading the information and will contact MSW when it's been completed. MSW informed the patient that a follow up call will be made in a week for updates and to complete process. The patient was in agreement with plan. MSW will follow to assist as needed.

## 2021-10-29 ENCOUNTER — HOSPITAL ENCOUNTER (OUTPATIENT)
Dept: CT IMAGING | Age: 61
Discharge: HOME | End: 2021-10-29
Attending: SURGERY
Payer: COMMERCIAL

## 2021-10-29 DIAGNOSIS — K52.9 COLITIS: ICD-10-CM

## 2021-10-29 PROCEDURE — 74177 CT ABD & PELVIS W/CONTRAST: CPT

## 2021-10-29 PROCEDURE — 74011000636 HC RX REV CODE- 636: Performed by: SURGERY

## 2021-10-29 RX ADMIN — IOPAMIDOL 100 ML: 612 INJECTION, SOLUTION INTRAVENOUS at 11:59

## 2021-11-01 ENCOUNTER — PATIENT OUTREACH (OUTPATIENT)
Dept: CASE MANAGEMENT | Age: 61
End: 2021-11-01

## 2021-11-01 NOTE — PROGRESS NOTES
Care Transitions Follow Up Call    Challenges to be reviewed by the provider   Additional needs identified to be addressed with provider: yes  Patient c/o intermittent lower abd pain. Pain located under umbilicus and radiates across abd in a straight line. Patient voiced sitting or walking makes it worse an laying down makes it better. Skin along this area also tender to touch. Incisions a little tender but denied s/s of infection. Denied constipation/diarrhea. Completed Levaquin and Flagyl. Method of communication with provider : chart routing    Care Transition Nurse (CTN) contacted the patient by telephone to follow up after admission on 21. Verified name and  with patient as identifiers. Addressed changes since last contact: medications- completed Levaquin and Flagyl  Follow up appointment completed? yes. Was follow up appointment scheduled within 7 days of discharge? no.     Advance Care Planning:   Does patient have an Advance Directive:  ACP referral placed    CTN reviewed discharge instructions, medical action plan and red flags with patient and discussed any barriers to care and/or understanding of plan of care after discharge. Discussed appropriate site of care based on symptoms and resources available to patient including: PCP, Specialist and CTN. The patient agrees to contact the PCP office for questions related to their healthcare. Patients top risk factors for readmission: medical condition-hernia repair   Interventions to address risk factors: Assess for s/s of infection    [address] follow up appointment(s):   Future Appointments   Date Time Provider Darren Gifford   2021  9:15 AM Shilpa Cox MD [facility] BS AMB   2021  9:30 AM Morgan Oconnell NP [facility] BS AMB     Non-BS follow up appointment(s): none    CTN provided contact information for future needs. No further follow-up call indicated based on severity of symptoms and risk factors.   Plan for next call: none     Patient has graduated from the Transitions of Care Coordination  program on 11/1/21. Patient/family has the ability to self-manage at this time Care management goals have been completed. Patient was not referred to the [facility] team for further management. Goals Addressed                 This Visit's Progress     COMPLETED: Attends follow-up appointments as directed. Goal: Patient will attend all appointments scheduled within the next 30 days. Ensure provider appt is scheduled within 7 business days post-discharge; 9/29: THANG appt request sent. Patient aware of surgical follow up appt   Confirm patient attended post-discharge provider appt ; 10/8; Attended PCP appt 10/7. Complete post-visit call to confirm attendance and update care needs; 10/8: Done. 10/21: Done. 11/1: Done         COMPLETED: Prevent complications post hospitalization. Goal: No admissions post 30 days from discharge of 9/28/21. Review/educate common or potential \"red flags\" of condition worsening; 9/29: Reviewed/educated on s/s of hematoma to monitor/report: new bruising, lump under at or near incisions, increased redness, pain or swelling. Also advised to report: N/V, SOB, CP, fever  Evaluate adherence to medications and priority barriers to resolve; 9/29: patient reported he has not had a BM since d/c. Has not started stool softener or laxative since d/c. Advised patient to start taking Colace and bisacodyl today to help prevent constipation. 10/8: Patient moving bowels and taking colace and laxative. 10/21: Verified patient taking Flagyl, Levaquin and Lactobacillus as prescribed. Discuss and provide resources for ACP; 9/29: Not on file; will address at a later time. 10/8: ACP referral placed              Patient has Care Transition Nurse's contact information for any further questions, concerns, or needs.   Patients upcoming visits:    Future Appointments   Date Time Provider Darren Gifford 11/2/2021  9:15 AM Latricia Lopez MD [facility] BS AMB   11/8/2021  9:30 AM Kaylie Darling NP [facility] BS AMB

## 2021-11-02 ENCOUNTER — OFFICE VISIT (OUTPATIENT)
Dept: SURGERY | Age: 61
End: 2021-11-02
Payer: COMMERCIAL

## 2021-11-02 VITALS
BODY MASS INDEX: 22.62 KG/M2 | OXYGEN SATURATION: 99 % | HEIGHT: 70 IN | SYSTOLIC BLOOD PRESSURE: 138 MMHG | RESPIRATION RATE: 18 BRPM | HEART RATE: 84 BPM | TEMPERATURE: 97.7 F | DIASTOLIC BLOOD PRESSURE: 88 MMHG | WEIGHT: 158 LBS

## 2021-11-02 DIAGNOSIS — G89.18 POSTOPERATIVE PAIN: Primary | ICD-10-CM

## 2021-11-02 PROCEDURE — 99024 POSTOP FOLLOW-UP VISIT: CPT | Performed by: SURGERY

## 2021-11-02 RX ORDER — IBUPROFEN 600 MG/1
600 TABLET ORAL EVERY 6 HOURS
Qty: 21 TABLET | Refills: 0 | Status: SHIPPED | OUTPATIENT
Start: 2021-11-02

## 2021-11-02 NOTE — PROGRESS NOTES
[facility]  General Surgery    Name: Lorean Gottron MRN: [id number]   : 1960 [facility]   Date: 2021 Admission Date: No admission date for patient encounter. Subjective:  Patient presents today with complaints of constant 2-3 out of 10 left lower quadrant abdominal wall/groin pain which occasionally radiates as a sharp pain across the abdomen several times throughout the day. He denies any accompanying symptoms such as nausea vomiting tenesmus or urgency to urinate. We discussed his recent CT scan which did not have a transcribed report however I did go back and review the transcription and the CAT scan independently on the monitor I agree with the findings of residual hematoma in the retroperitoneum around the duodenum and pancreas. No evidence of abscess no evidence of colitis. No evidence of hernia recurrence. Objective:  Vitals:    21 0917   BP: 138/88   Pulse: 84   Resp: 18   Temp: 97.7 °F (36.5 °C)   TempSrc: Temporal   SpO2: 99%   Weight: 71.7 kg (158 lb)   Height: 5' 10\" (1.778 m)       Physical Exam:    General: Awake and alert, oriented x4, no apparent distress   Abdomen: abdomen is soft with a small firm fibrotic filling mass adjacent to the pubic tubercle in the left groin with tenderness. Incision(s) are C/D/I. No evidence of hernia recurrence with cough. No masses, organomegaly or guarding    Current Medications:  Current Outpatient Medications   Medication Sig Dispense Refill    lactobacillus combination no.4 (Probiotic) 3 billion cell cap Take 1 Capsule by mouth daily. 90 Capsule 0    traZODone (DESYREL) 50 mg tablet Take 1 Tablet by mouth nightly.  30 Tablet 2    Xigduo XR 5-1,000 mg TBph TAKE 1 TABLET BY MOUTH EVERY DAY 90 Tablet 0    metoprolol succinate (TOPROL-XL) 50 mg XL tablet TAKE 1 TABLET BY MOUTH EVERY DAY 90 Tablet 0    rosuvastatin (CRESTOR) 20 mg tablet TAKE 1 TABLET BY MOUTH EVERY DAY 90 Tablet 0    docusate sodium (COLACE) 100 mg capsule Take 1 Capsule by mouth two (2) times a day for 90 days. 60 Capsule 2    fenofibrate nanocrystallized (TRICOR) 48 mg tablet TAKE 1 TABLET BY MOUTH EVERY DAY 15 Tab 0    ONETOUCH ULTRA TEST strip TEST ONCE IN THE AM FASTING, THEN 2 HRS AFTER DINNER. DISP: 180 STRIPS. 180 Strip 2    Blood-Glucose Meter (ONE TOUCH ULTRAMINI) monitoring kit by Does Not Apply route. Test 1 am fasting then 2 hrs after dinner 1 Kit 0    Lancets (ONE TOUCH DELICA) Misc by Does Not Apply route. Test once in the AM fasting, then 2 hrs after dinner. Disp:  A box for 180 1 Package 11    acetaminophen (TYLENOL) 325 mg tablet Take 2 Tablets by mouth every six (6) hours. Indications: pain (Patient not taking: Reported on 10/13/2021) 42 Tablet 0       Chart and notes reviewed. Data reviewed. I have evaluated and examined the patient. IMPRESSION:   · Patient is nearly 2 months out from robot-assisted laparoscopic left inguinal hernia repair with mesh with slowly improving symptoms of pain and complete resolution of symptoms of anemia of acute blood loss secondary to retroperitoneal hematoma. PLAN:/DISCUSION:   · I will call patient to discuss the most recent CAT scan results. · Patient should continue with half days at work.   · Follow-up with me in 2 weeks        Brandie Pompa MD

## 2021-12-01 ENCOUNTER — PATIENT OUTREACH (OUTPATIENT)
Dept: CASE MANAGEMENT | Age: 61
End: 2021-12-01

## 2021-12-01 NOTE — PROGRESS NOTES
Social Work Note  12/1/2021    Date of referral: 10/08/2021  Referral received from: KAROLINE Vaughn RN  Reason for referral: Assist the patient with the process and completion of ACP    MSW attempted to contact the patient for follow up on ACP. Left message with information to return call. Will attempt to contact the patient at a later time.

## 2021-12-08 ENCOUNTER — OFFICE VISIT (OUTPATIENT)
Dept: SURGERY | Age: 61
End: 2021-12-08
Payer: COMMERCIAL

## 2021-12-08 VITALS
RESPIRATION RATE: 15 BRPM | WEIGHT: 165 LBS | HEIGHT: 70 IN | DIASTOLIC BLOOD PRESSURE: 76 MMHG | OXYGEN SATURATION: 99 % | HEART RATE: 80 BPM | BODY MASS INDEX: 23.62 KG/M2 | SYSTOLIC BLOOD PRESSURE: 110 MMHG | TEMPERATURE: 98.1 F

## 2021-12-08 DIAGNOSIS — Z09 POSTOPERATIVE EXAMINATION: Primary | ICD-10-CM

## 2021-12-08 PROCEDURE — 99024 POSTOP FOLLOW-UP VISIT: CPT | Performed by: SURGERY

## 2021-12-08 NOTE — PROGRESS NOTES
Radha Blakely is a 63 y.o. male  Chief Complaint   Patient presents with    Follow-up     1 month follow up

## 2021-12-08 NOTE — PROGRESS NOTES
[facility]  General Surgery    Name: Carlos Palencia MRN: [id number]   : 1960 [facility]   Date: 2021 Admission Date: No admission date for patient encounter. Subjective:  Patient status post left inguinal hernia repair robot-assisted laparoscopic. He presents today without any new complaints. He still has residual numbness and tenderness to touch along the lower abdomen above the pubic bone. He denies any lightheadedness or dizziness. His energy level is good. He has been back at work full-time. Objective:  Vitals:    21 1438   BP: 110/76   Pulse: 80   Resp: 15   Temp: 98.1 °F (36.7 °C)   TempSrc: Temporal   SpO2: 99%   Weight: 74.8 kg (165 lb)   Height: 5' 10\" (1.778 m)       Physical Exam:    General: Awake and alert, oriented x4, no apparent distress   Abdomen: abdomen is soft without tenderness. Incision(s) are C/D/I. No evidence of hernia recurrence with  cough. No masses, organomegaly or guarding    Current Medications:  Current Outpatient Medications   Medication Sig Dispense Refill    ibuprofen (MOTRIN) 600 mg tablet Take 1 Tablet by mouth every six (6) hours. 21 Tablet 0    lactobacillus combination no.4 (Probiotic) 3 billion cell cap Take 1 Capsule by mouth daily. 90 Capsule 0    traZODone (DESYREL) 50 mg tablet Take 1 Tablet by mouth nightly. 30 Tablet 2    Xigduo XR 5-1,000 mg TBph TAKE 1 TABLET BY MOUTH EVERY DAY 90 Tablet 0    metoprolol succinate (TOPROL-XL) 50 mg XL tablet TAKE 1 TABLET BY MOUTH EVERY DAY 90 Tablet 0    rosuvastatin (CRESTOR) 20 mg tablet TAKE 1 TABLET BY MOUTH EVERY DAY 90 Tablet 0    fenofibrate nanocrystallized (TRICOR) 48 mg tablet TAKE 1 TABLET BY MOUTH EVERY DAY 15 Tab 0    ONETOUCH ULTRA TEST strip TEST ONCE IN THE AM FASTING, THEN 2 HRS AFTER DINNER. DISP: 180 STRIPS. 180 Strip 2    Blood-Glucose Meter (ONE TOUCH ULTRAMINI) monitoring kit by Does Not Apply route.  Test 1 am fasting then 2 hrs after dinner 1 Kit 0    Lancets (ONE TOUCH DELICA) Misc by Does Not Apply route. Test once in the AM fasting, then 2 hrs after dinner. Disp:  A box for 180 1 Package 11    acetaminophen (TYLENOL) 325 mg tablet Take 2 Tablets by mouth every six (6) hours. Indications: pain (Patient not taking: Reported on 10/13/2021) 42 Tablet 0    docusate sodium (COLACE) 100 mg capsule Take 1 Capsule by mouth two (2) times a day for 90 days. (Patient not taking: Reported on 12/8/2021) 60 Capsule 2       Chart and notes reviewed. Data reviewed. I have evaluated and examined the patient. IMPRESSION:   · Patient patient with retroperitoneal hematoma as a complication of robot-assisted laparoscopic left inguinal hernia repair with mesh.       PLAN:/DISCUSION:   · Follow-up as needed        Monique Robbins MD

## 2021-12-26 LAB — SARS-COV-2, NAA: NOT DETECTED

## 2022-01-05 RX ORDER — TRAZODONE HYDROCHLORIDE 50 MG/1
50 TABLET ORAL
Qty: 90 TABLET | Refills: 0 | Status: SHIPPED | OUTPATIENT
Start: 2022-01-05 | End: 2022-04-14

## 2022-01-05 NOTE — TELEPHONE ENCOUNTER
Insurance requires a minimum fill for 90 days    It appears patient has an appt on 3/10/22 with NP Saira Mckee    Last Visit: 8/4/21 with NP Phyllis Walter  Previous Refill Encounter(s): 10/7/21 #30 with 2 refills    Requested Prescriptions     Pending Prescriptions Disp Refills    traZODone (DESYREL) 50 mg tablet 90 Tablet 0     Sig: Take 1 Tablet by mouth nightly.

## 2022-02-03 ENCOUNTER — TELEPHONE (OUTPATIENT)
Dept: SURGERY | Age: 62
End: 2022-02-03

## 2022-02-03 DIAGNOSIS — I71.40 ABDOMINAL AORTIC ANEURYSM (AAA) 3.0 CM TO 5.5 CM IN DIAMETER IN MALE: Primary | ICD-10-CM

## 2022-02-03 NOTE — TELEPHONE ENCOUNTER
Pt daughter Brijesh Sumner called and left message on nurse line that patient was supposed to have a referral put in to see surgeon since last appt. and there is not a referral.  I checked pt record and called Dr. Gala De La Rosa. He stated he can put a referral to a vascular surgeon Dr. Zeb Orourke. He will have  to call patient.

## 2022-02-04 ENCOUNTER — DOCUMENTATION ONLY (OUTPATIENT)
Dept: SURGERY | Age: 62
End: 2022-02-04

## 2022-03-19 PROBLEM — E87.8 HYPERCHLOREMIA: Status: ACTIVE | Noted: 2019-02-11

## 2022-03-19 PROBLEM — K40.90 LEFT INGUINAL HERNIA: Status: ACTIVE | Noted: 2021-09-27

## 2022-03-19 PROBLEM — R58 RETROPERITONEAL BLEED: Status: ACTIVE | Noted: 2021-09-27

## 2022-03-31 ENCOUNTER — PATIENT OUTREACH (OUTPATIENT)
Dept: CASE MANAGEMENT | Age: 62
End: 2022-03-31

## 2022-03-31 NOTE — PROGRESS NOTES
Social Work Note  3/31/2022    Date of referral: 10/08/2021  Referral received from: KAROLINE Stanton RN  Reason for referral: Assist the patient with the process and completion of ACP.      MSW contacted the patient and introduced self, role and reason for call.      The patient verified his name and  as identifiers. The patient is alert and oriented and engaged in conversation. The patient reported that he had received the ACP information packet mailed to him, however he doesn't know where to find it. He was open to receiving another packet. Address information was confirmed. The patient was made aware that a follow up call will be made to confirm receipt of packet, and he verbalized understanding. He shared that he will be okay with completing AMD on his own. MSW will follow to assist as needed.

## 2022-04-08 NOTE — TELEPHONE ENCOUNTER
Last Visit: 10/7/21 with NICK Gonzáles, 8/4/21 with NICK Kong  Next Appointment: 11/8/21 pt cancelled appt with NICK Kong  Previous Refill Encounter(s): 1/5/22 #90    Requested Prescriptions     Pending Prescriptions Disp Refills    traZODone (DESYREL) 50 mg tablet [Pharmacy Med Name: TRAZODONE 50 MG TABLET] 90 Tablet 0     Sig: TAKE 1 TABLET BY MOUTH NIGHTLY

## 2022-04-13 ENCOUNTER — PATIENT OUTREACH (OUTPATIENT)
Dept: CASE MANAGEMENT | Age: 62
End: 2022-04-13

## 2022-04-13 NOTE — PROGRESS NOTES
Social Work Note  4/13/2022    Date of referral: 10/08/2021  Referral received from: KAROLINE Apple RN  Reason for referral: Assist the patient with the process and completion of ACP     MSW attempted to contact the patient for follow up on ACP.    Left message with information to return call.      Will follow to assist the patient as needed.

## 2022-04-14 RX ORDER — TRAZODONE HYDROCHLORIDE 50 MG/1
50 TABLET ORAL
Qty: 90 TABLET | Refills: 0 | Status: SHIPPED | OUTPATIENT
Start: 2022-04-14

## 2022-04-21 ENCOUNTER — PATIENT OUTREACH (OUTPATIENT)
Dept: CASE MANAGEMENT | Age: 62
End: 2022-04-21

## 2022-04-21 ENCOUNTER — TRANSCRIBE ORDER (OUTPATIENT)
Dept: SCHEDULING | Age: 62
End: 2022-04-21

## 2022-04-21 DIAGNOSIS — I71.40 ABDOMINAL AORTIC ANEURYSM WITHOUT RUPTURE: Primary | ICD-10-CM

## 2022-04-21 NOTE — PROGRESS NOTES
Social Work Note  4/21/2022     Date of referral: 10/08/2021  Referral received from: KAROLINE Benavides RN  Reason for referral: Assist the patient with the process and completion of ACP     MSW attempted to contact the patient for follow up on ACP.    Left message with information to return call. No further follow up will be required. Episode will be resolved. MSW will be available to assist with any future needs.

## 2022-04-22 RX ORDER — DOCUSATE SODIUM 100 MG/1
CAPSULE, LIQUID FILLED ORAL
Qty: 60 CAPSULE | Refills: 2 | Status: SHIPPED | OUTPATIENT
Start: 2022-04-22

## 2022-06-02 ENCOUNTER — HOSPITAL ENCOUNTER (OUTPATIENT)
Dept: CT IMAGING | Age: 62
Discharge: HOME | End: 2022-06-02
Attending: SURGERY
Payer: COMMERCIAL

## 2022-06-02 DIAGNOSIS — I71.40 ABDOMINAL AORTIC ANEURYSM WITHOUT RUPTURE: ICD-10-CM

## 2022-06-02 LAB — CREAT UR-MCNC: 0.9 MG/DL (ref 0.6–1.3)

## 2022-06-02 PROCEDURE — 74174 CTA ABD&PLVS W/CONTRAST: CPT

## 2022-06-02 PROCEDURE — 74011000636 HC RX REV CODE- 636: Performed by: SURGERY

## 2022-06-02 PROCEDURE — 82565 ASSAY OF CREATININE: CPT

## 2022-06-02 RX ADMIN — IOPAMIDOL 114 ML: 755 INJECTION, SOLUTION INTRAVENOUS at 08:24

## 2022-08-29 LAB — HBA1C MFR BLD HPLC: 7.1 %

## 2022-09-13 ENCOUNTER — CLINICAL SUPPORT (OUTPATIENT)
Dept: SURGERY | Age: 62
End: 2022-09-13

## 2022-09-13 VITALS
HEIGHT: 70 IN | WEIGHT: 160 LBS | BODY MASS INDEX: 22.9 KG/M2 | TEMPERATURE: 98.1 F | HEART RATE: 79 BPM | RESPIRATION RATE: 18 BRPM | OXYGEN SATURATION: 98 %

## 2022-09-13 DIAGNOSIS — Z86.010 HISTORY OF COLON POLYPS: ICD-10-CM

## 2022-09-13 DIAGNOSIS — Z01.818 PRE-OP TESTING: Primary | ICD-10-CM

## 2022-09-13 DIAGNOSIS — Z80.0 FAMILY HISTORY OF COLON CANCER IN FATHER: ICD-10-CM

## 2022-09-13 DIAGNOSIS — Z12.11 COLON CANCER SCREENING: ICD-10-CM

## 2022-09-13 RX ORDER — METFORMIN HYDROCHLORIDE 500 MG/1
TABLET, EXTENDED RELEASE ORAL
COMMUNITY

## 2022-09-13 RX ORDER — GUAIFENESIN 100 MG/5ML
81 LIQUID (ML) ORAL DAILY
COMMUNITY

## 2022-09-13 RX ORDER — ZOLPIDEM TARTRATE 10 MG/1
TABLET ORAL
COMMUNITY

## 2022-09-13 NOTE — PROGRESS NOTES
Colon Screen    Patient: Ponce Bill MRN: [id number]  SSN: [id number]    YOB: 1960  Age: 63 y.o. Sex: male        Subjective:   Ponce Bill is here to schedule his recall colonoscopy. His PCP is Constantine Baker MD.  Patient referred for colonoscopy for   Personal history of colon polyps (screening only). Patient denies rectal pain or bleeding. Abdominal surgeries as described below, specifically hernia repair. Family history as described below, specifically father with colon cancer/polyps. Last colonoscopy was 5 years ago with Dr. Ayanna Wetzel. No Known Allergies    Past Medical History:   Diagnosis Date    CAD (coronary artery disease)     coronary artery stent    Diabetes ([facility] [address]     Hypercholesterolemia     Personal history of tobacco use, presenting hazards to health      Past Surgical History:   Procedure Laterality Date    COLONOSCOPY N/A 2017    COLONOSCOPY / polypectomy performed by Leonila Arellano MD at [facility] [address]  2021    inguial hernia with mesh    HX ORTHOPAEDIC      broken nose    [place]  [place]    stent    [place] COL[place] FLX W/RMVL OF TUMOR POLYP LESION SNARE TQ  12/16/15    Dr. Ottoniel Davis f/u 2016    STENT INSERTION        Family History   Problem Relation Age of Onset    Cancer Mother     Cancer Maternal Uncle     Hypertension Father     Cancer Father         colon/rectal CA [de-identified] yo    Colon Cancer Father     Colon Polyps Father     Heart Disease Other         Positive family history of heart disease     Social History     Tobacco Use    Smoking status: Former     Types: Cigarettes     Quit date: 2012     Years since quittin.7    Smokeless tobacco: Never   Substance Use Topics    Alcohol use: Yes     Comment: Beer in summer      Prior to Admission medications    Medication Sig Start Date End Date Taking?  Authorizing Provider   metFORMIN ER (GLUCOPHAGE XR) 500 mg tablet Take  by mouth daily (with dinner). Yes Provider, Historical   zolpidem (Ambien) 10 mg tablet Take  by mouth nightly as needed for Sleep. Yes Provider, Historical   rosuvastatin (CRESTOR) 20 mg tablet TAKE 1 TABLET BY MOUTH EVERY DAY 12/20/21  Yes Allyson Oneil NP   metoprolol succinate (TOPROL-XL) 50 mg XL tablet TAKE 1 TABLET BY MOUTH EVERY DAY 12/20/21  Yes Allyson Oneil NP   Lancets ([facility]) Misc by Does Not Apply route. Test once in the AM fasting, then 2 hrs after dinner. Disp:  A box for 180 1/16/12  Yes Burt VIZCARRA DO   docusate sodium (COLACE) 100 mg capsule TAKE 1 CAPSULE BY MOUTH TWO (2) TIMES A DAY  Patient not taking: Reported on 9/13/2022 4/22/22   Fran Green MD   traZODone (DESYREL) 50 mg tablet TAKE 1 TABLET BY MOUTH NIGHTLY  Patient not taking: Reported on 9/13/2022 4/14/22   Allyson Oneil NP   Probiotic 3 billion cell cap TAKE 1 CAPSULE BY MOUTH EVERY DAY  Patient not taking: Reported on 9/13/2022 1/7/22   Fran Green MD   ibuprofen (MOTRIN) 600 mg tablet Take 1 Tablet by mouth every six (6) hours. Patient not taking: Reported on 9/13/2022 11/2/21   Fran Green MD   Xigduo XR 5-1,000 mg TBph TAKE 1 TABLET BY MOUTH EVERY DAY  Patient not taking: Reported on 9/13/2022 9/27/21   Allyson Oneil NP   acetaminophen (TYLENOL) 325 mg tablet Take 2 Tablets by mouth every six (6) hours. Indications: pain  Patient not taking: No sig reported 9/27/21   Fran Green MD   fenofibrate nanocrystallized (TRICOR) 48 mg tablet TAKE 1 TABLET BY MOUTH EVERY DAY  Patient not taking: Reported on 9/13/2022 10/21/19   Damion Palacios NP   [facility] ULTRA TEST strip TEST ONCE IN THE AM FASTING, THEN 2 HRS AFTER DINNER. DISP: 180 STRIPS. Patient not taking: Reported on 9/13/2022 2/29/16   Tenzin Lindsey DO   Blood-Glucose Meter (ONE TOUCH ULTRAMINI) monitoring kit by Does Not Apply route.  Test 1 am fasting then 2 hrs after dinner  Patient not taking: Reported on 9/13/2022 1/16/12   Sandi [place],           Review of Systems:  Review of Systems   Constitutional: Negative. HENT: Negative. Eyes: Negative. Respiratory: Negative. Cardiovascular: Negative. Gastrointestinal: Negative. Genitourinary: Negative. Musculoskeletal: Negative. Skin: Negative. Endo/Heme/Allergies:  Negative for environmental allergies and polydipsia. Bruises/bleeds easily. Psychiatric/Behavioral: Negative. Risks colonoscopy described- colon injury, missed lesion, anesthesia problems, bleeding       Tiarra Ni, LPN  September 13, 7271  10:54 AM

## 2023-03-20 ENCOUNTER — HOSPITAL ENCOUNTER (OUTPATIENT)
Facility: HOSPITAL | Age: 63
Discharge: HOME | End: 2023-03-23
Payer: COMMERCIAL

## 2023-03-20 DIAGNOSIS — Z01.818 PRE-OP EXAM: ICD-10-CM

## 2023-03-20 PROCEDURE — 93005 ELECTROCARDIOGRAM TRACING: CPT

## 2023-03-21 LAB
EKG ATRIAL RATE: 81 BPM
EKG DIAGNOSIS: NORMAL
EKG P AXIS: 61 DEGREES
EKG P-R INTERVAL: 148 MS
EKG Q-T INTERVAL: 360 MS
EKG QRS DURATION: 82 MS
EKG QTC CALCULATION (BAZETT): 418 MS
EKG R AXIS: 0 DEGREES
EKG T AXIS: 58 DEGREES
EKG VENTRICULAR RATE: 81 BPM

## 2023-03-21 PROCEDURE — 93010 ELECTROCARDIOGRAM REPORT: CPT | Performed by: INTERNAL MEDICINE

## 2023-03-27 RX ORDER — ZOLPIDEM TARTRATE 6.25 MG/1
12.5 TABLET, FILM COATED, EXTENDED RELEASE ORAL NIGHTLY PRN
COMMUNITY
Start: 2023-01-30

## 2023-03-27 NOTE — PROGRESS NOTES
[facility]' PAT phone assessment completed on 3/27. The following instructions were reviewed with [facility]. [facility] verbalized understanding. Do NOT eat or drink anything, including candy, gum, or ice chips after midnight on 3/29, unless you have specific instructions from your surgeon or anesthesia provider to do so. You may brush your teeth before coming to the hospital.  No smoking 24 hours prior to the day of surgery. No alcohol 24 hours prior to the day of surgery. No recreational drugs for one week prior to the day of surgery. Leave all valuables, including money/purse, at home. Remove all jewelry, nail polish, acrylic nails, and makeup (including mascara); no lotions powders, deodorant, or perfume/cologne/after shave on the skin. Glasses/contact lenses and dentures may be worn to the hospital.  They will be removed prior to surgery. Call your doctor if symptoms of a cold or illness develop within 24-48 hours prior to your surgery. 10.  AN ADULT MUST DRIVE YOU HOME AFTER OUTPATIENT SURGERY. 11.  If you are having an outpatient procedure, please make arrangements for a responsible adult to be with you for 24 hours after your surgery. 12. ONE VISITOR in the hospital at this time for outpatient procedures. Exceptions may be made for surgical admissions, per hospital guidelines        Special Instructions:      Bring list of CURRENT medications. Bring any pertinent legal medical records. Take these medications the morning of surgery with a sip of water:  as directed by MD  Follow physician instructions about stopping anticoagulants. Complete bowel prep per MD instructions.

## 2023-03-28 ENCOUNTER — ANESTHESIA EVENT (OUTPATIENT)
Facility: HOSPITAL | Age: 63
End: 2023-03-28
Payer: COMMERCIAL

## 2023-03-29 ENCOUNTER — ANESTHESIA (OUTPATIENT)
Facility: HOSPITAL | Age: 63
End: 2023-03-29
Payer: COMMERCIAL

## 2023-03-29 ENCOUNTER — HOSPITAL ENCOUNTER (OUTPATIENT)
Facility: HOSPITAL | Age: 63
Setting detail: OUTPATIENT SURGERY
Discharge: HOME | End: 2023-03-29
Attending: COLON & RECTAL SURGERY | Admitting: COLON & RECTAL SURGERY
Payer: COMMERCIAL

## 2023-03-29 VITALS
SYSTOLIC BLOOD PRESSURE: 123 MMHG | OXYGEN SATURATION: 97 % | HEIGHT: 70 IN | BODY MASS INDEX: 23.91 KG/M2 | WEIGHT: 167 LBS | RESPIRATION RATE: 16 BRPM | HEART RATE: 65 BPM | TEMPERATURE: 97.7 F | DIASTOLIC BLOOD PRESSURE: 86 MMHG

## 2023-03-29 LAB
GLUCOSE BLD STRIP.AUTO-MCNC: 159 MG/DL (ref 70–110)
GLUCOSE BLD STRIP.AUTO-MCNC: 183 MG/DL (ref 70–110)

## 2023-03-29 PROCEDURE — 88305 TISSUE EXAM BY PATHOLOGIST: CPT

## 2023-03-29 PROCEDURE — 3600007512: Performed by: COLON & RECTAL SURGERY

## 2023-03-29 PROCEDURE — 7100000010 HC PHASE II RECOVERY - FIRST 15 MIN: Performed by: COLON & RECTAL SURGERY

## 2023-03-29 PROCEDURE — 3700000000 HC ANESTHESIA ATTENDED CARE: Performed by: COLON & RECTAL SURGERY

## 2023-03-29 PROCEDURE — 6370000000 HC RX 637 (ALT 250 FOR IP): Performed by: NURSE ANESTHETIST, CERTIFIED REGISTERED

## 2023-03-29 PROCEDURE — 3600007502: Performed by: COLON & RECTAL SURGERY

## 2023-03-29 PROCEDURE — 2580000003 HC RX 258: Performed by: NURSE ANESTHETIST, CERTIFIED REGISTERED

## 2023-03-29 PROCEDURE — 2709999900 HC NON-CHARGEABLE SUPPLY: Performed by: COLON & RECTAL SURGERY

## 2023-03-29 PROCEDURE — 7100000000 HC PACU RECOVERY - FIRST 15 MIN: Performed by: COLON & RECTAL SURGERY

## 2023-03-29 PROCEDURE — 7100000001 HC PACU RECOVERY - ADDTL 15 MIN: Performed by: COLON & RECTAL SURGERY

## 2023-03-29 PROCEDURE — 2500000003 HC RX 250 WO HCPCS: Performed by: NURSE ANESTHETIST, CERTIFIED REGISTERED

## 2023-03-29 PROCEDURE — 7100000011 HC PHASE II RECOVERY - ADDTL 15 MIN: Performed by: COLON & RECTAL SURGERY

## 2023-03-29 PROCEDURE — 6360000002 HC RX W HCPCS: Performed by: NURSE ANESTHETIST, CERTIFIED REGISTERED

## 2023-03-29 PROCEDURE — 3700000001 HC ADD 15 MINUTES (ANESTHESIA): Performed by: COLON & RECTAL SURGERY

## 2023-03-29 PROCEDURE — 82962 GLUCOSE BLOOD TEST: CPT

## 2023-03-29 RX ORDER — FAMOTIDINE 20 MG/1
20 TABLET, FILM COATED ORAL ONCE
Status: COMPLETED | OUTPATIENT
Start: 2023-03-29 | End: 2023-03-29

## 2023-03-29 RX ORDER — DEXTROSE MONOHYDRATE 100 MG/ML
INJECTION, SOLUTION INTRAVENOUS CONTINUOUS PRN
Status: DISCONTINUED | OUTPATIENT
Start: 2023-03-29 | End: 2023-03-29 | Stop reason: HOSPADM

## 2023-03-29 RX ORDER — LIDOCAINE HYDROCHLORIDE 10 MG/ML
1 INJECTION, SOLUTION EPIDURAL; INFILTRATION; INTRACAUDAL; PERINEURAL
Status: DISCONTINUED | OUTPATIENT
Start: 2023-03-29 | End: 2023-03-29 | Stop reason: HOSPADM

## 2023-03-29 RX ORDER — INSULIN LISPRO 100 [IU]/ML
1-15 INJECTION, SOLUTION INTRAVENOUS; SUBCUTANEOUS ONCE
Status: COMPLETED | OUTPATIENT
Start: 2023-03-29 | End: 2023-03-29

## 2023-03-29 RX ORDER — INSULIN LISPRO 100 [IU]/ML
0-15 INJECTION, SOLUTION INTRAVENOUS; SUBCUTANEOUS ONCE
Status: DISCONTINUED | OUTPATIENT
Start: 2023-03-29 | End: 2023-03-29 | Stop reason: HOSPADM

## 2023-03-29 RX ORDER — LIDOCAINE HYDROCHLORIDE 20 MG/ML
INJECTION, SOLUTION EPIDURAL; INFILTRATION; INTRACAUDAL; PERINEURAL PRN
Status: DISCONTINUED | OUTPATIENT
Start: 2023-03-29 | End: 2023-03-29 | Stop reason: SDUPTHER

## 2023-03-29 RX ORDER — SODIUM CHLORIDE, SODIUM LACTATE, POTASSIUM CHLORIDE, CALCIUM CHLORIDE 600; 310; 30; 20 MG/100ML; MG/100ML; MG/100ML; MG/100ML
INJECTION, SOLUTION INTRAVENOUS CONTINUOUS
Status: DISCONTINUED | OUTPATIENT
Start: 2023-03-29 | End: 2023-03-29 | Stop reason: HOSPADM

## 2023-03-29 RX ORDER — PROPOFOL 10 MG/ML
INJECTION, EMULSION INTRAVENOUS PRN
Status: DISCONTINUED | OUTPATIENT
Start: 2023-03-29 | End: 2023-03-29 | Stop reason: SDUPTHER

## 2023-03-29 RX ADMIN — LIDOCAINE HYDROCHLORIDE 5 MG: 20 INJECTION, SOLUTION EPIDURAL; INFILTRATION; INTRACAUDAL; PERINEURAL at 08:30

## 2023-03-29 RX ADMIN — PROPOFOL 50 MG: 10 INJECTION, EMULSION INTRAVENOUS at 08:43

## 2023-03-29 RX ADMIN — INSULIN LISPRO 3 UNITS: 100 INJECTION, SOLUTION INTRAVENOUS; SUBCUTANEOUS at 07:38

## 2023-03-29 RX ADMIN — SODIUM CHLORIDE, SODIUM LACTATE, POTASSIUM CHLORIDE, AND CALCIUM CHLORIDE: 600; 310; 30; 20 INJECTION, SOLUTION INTRAVENOUS at 08:25

## 2023-03-29 RX ADMIN — PROPOFOL 70 MG: 10 INJECTION, EMULSION INTRAVENOUS at 08:30

## 2023-03-29 RX ADMIN — PROPOFOL 30 MG: 10 INJECTION, EMULSION INTRAVENOUS at 08:33

## 2023-03-29 RX ADMIN — FAMOTIDINE 20 MG: 20 TABLET, FILM COATED ORAL at 07:38

## 2023-03-29 RX ADMIN — PROPOFOL 30 MG: 10 INJECTION, EMULSION INTRAVENOUS at 08:46

## 2023-03-29 RX ADMIN — PROPOFOL 30 MG: 10 INJECTION, EMULSION INTRAVENOUS at 08:51

## 2023-03-29 RX ADMIN — PROPOFOL 20 MG: 10 INJECTION, EMULSION INTRAVENOUS at 08:36

## 2023-03-29 ASSESSMENT — PAIN SCALES - WONG BAKER: WONGBAKER_NUMERICALRESPONSE: 0

## 2023-03-29 ASSESSMENT — PAIN - FUNCTIONAL ASSESSMENT: PAIN_FUNCTIONAL_ASSESSMENT: 0-10

## 2023-03-29 NOTE — DISCHARGE INSTRUCTIONS
Repeat colonoscopy in 3 year(s). No aspirin or ibuprofen (e.g. Aleve, Motrin, Advil) for 5 days. Findings/Interventions:   Polyps - #1, 10 mm in size, located in the ascending colon, removed by snare cautery and retrieved for pathology, - #2, 5 mm in size, located in the sigmoid, removed by cold biopsy and sent for pathology. EBL: none     Recommendations: -Repeat colonoscopy in 3 years. NO aspirin for 5 days        Colonoscopy: What to Expect at [address] [place] [address]  After a colonoscopy, you'll stay at the clinic until you wake up. Then you can go home. But you'll need to arrange for a ride. Your doctor will tell you when you can eat and do your other usual activities. Your doctor will talk to you about when you'll need your next colonoscopy. Your doctor can help you decide how often you need to be checked. This will depend on the results of your test and your risk for colorectal cancer. After the test, you may be bloated or have gas pains. You may need to pass gas. If a biopsy was done or a polyp was removed, you may have streaks of blood in your stool (feces) for a few days. Problems such as heavy rectal bleeding may not occur until several weeks after the test. This isn't common. But it can happen after polyps are removed. This care sheet gives you a general idea about how long it will take for you to recover. But each person recovers at a different pace. Follow the steps below to get better as quickly as possible. How can you care for yourself at home? Activity    Rest when you feel tired. You can do your normal activities when it feels okay to do so. Diet    Follow your doctor's directions for eating. Unless your doctor has told you not to, drink plenty of fluids. This helps to replace the fluids that were lost during the colon prep. Do not drink alcohol. Medicines    Your doctor will tell you if and when you can restart your medicines.  You will also be given instructions about

## 2023-03-29 NOTE — ANESTHESIA POSTPROCEDURE EVALUATION
Department of Anesthesiology  Postprocedure Note    Patient: Hasmukh Leo  MRN: [id number]  YOB: 1960  Date of evaluation: 3/29/2023      Procedure Summary     Date: 03/29/23 Room / Location: [facility] ENDO 01 / [facility] ENDOSCOPY    Anesthesia Start: 0825 Anesthesia Stop: 4940    Procedure: COLONOSCOPY (Abdomen) Diagnosis:       Colon cancer screening      (Colon cancer screening [Z12.11])    Surgeons: Cathryn Erazo MD Responsible Provider: Ida Heller MD    Anesthesia Type: MAC ASA Status: 2          Anesthesia Type: MAC    Lilian Phase I: Lilian Score: 10    Lilian Phase II: Lilian Score: 10      Anesthesia Post Evaluation    Patient location during evaluation: bedside  Patient participation: complete - patient participated  Airway patency: patent  Complications: no  Cardiovascular status: hemodynamically stable  Respiratory status: acceptable  Hydration status: stable

## 2023-03-29 NOTE — OP NOTE
Colonoscopy Procedure Note      Jose Guadalupe Lara  1960  [id number]                Date of Procedure: 03/29/23    Preoperative diagnosis: History of Polyps    Postoperative diagnosis: Polyps of ascending and sigmoid colon    :  Rickie Yang MD    Assistant(s): Chelyulator: Wilner Olvera RN  Endoscopy Technician: Sebastian Brown    Sedation: MAC    Complications: None    Implants: None    Procedure Details:  Prior to the procedure, a history and physical were performed. The patients medications, allergies and sensitivities were reviewed and all questions were answered. After informed consent was obtained for the procedure, with all risks and benefits of procedure explained. The patient was taken to the endoscopy suite and placed in the left lateral decubitus position. Patient identification and proposed procedure were verified prior to the procedure by the nurse and I. After sequential anesthesia administered by anesthesiologist, a digital rectal exam was performed and was normal.  The Olympus video colonoscope was introduced through the anus and advanced to cecum, which was identified by the ileocecal valve and appendiceal orifice. The quality of preparation was good. The colonoscope was slowly withdrawn and the mucosa examined for any abnormalities. Cecal withdrawal time was greater than 6 minutes. The patient tolerated the procedure well. There were no complications. Findings/Interventions:   Polyps - #1, 10 mm in size, located in the ascending colon, removed by snare cautery and retrieved for pathology, - #2, 5 mm in size, located in the sigmoid, removed by cold biopsy and sent for pathology. EBL: none    Recommendations: -Repeat colonoscopy in 3 years.    NO aspirin for 5 days     Discharge Disposition:  Home  Rickie Yang MD  3/29/2023  8:56 AM

## 2023-03-29 NOTE — ANESTHESIA PRE PROCEDURE
Status: Time of last liquid consumption: 1700                        Time of last solid consumption: 2100                        Date of last liquid consumption: 03/28/23                        Date of last solid food consumption: 03/27/23    BMI:   Wt Readings from Last 3 Encounters:   03/29/23 167 lb (75.8 kg)   09/13/22 160 lb (72.6 kg)   12/08/21 165 lb (74.8 kg)     Body mass index is 23.96 kg/m².     CBC:   Lab Results   Component Value Date/Time    WBC 7.6 10/12/2021 09:02 AM    RBC 4.46 10/12/2021 09:02 AM    HGB 13.7 10/12/2021 09:02 AM    HCT 40.9 10/12/2021 09:02 AM    MCV 91.7 10/12/2021 09:02 AM    RDW 12.5 10/12/2021 09:02 AM     10/12/2021 09:02 AM       CMP:   Lab Results   Component Value Date/Time     10/12/2021 09:02 AM    K 4.4 10/12/2021 09:02 AM     10/12/2021 09:02 AM    CO2 26 10/12/2021 09:02 AM    BUN 18 10/12/2021 09:02 AM    CREATININE 0.81 10/12/2021 09:02 AM    GFRAA >60 06/02/2022 08:08 AM    AGRATIO 0.9 10/12/2021 09:02 AM    LABGLOM >60 06/02/2022 08:08 AM    GLUCOSE 147 10/12/2021 09:02 AM    PROT 7.4 10/12/2021 09:02 AM    CALCIUM 9.4 10/12/2021 09:02 AM    BILITOT 0.7 10/12/2021 09:02 AM    ALKPHOS 117 10/12/2021 09:02 AM    AST 41 10/12/2021 09:02 AM    ALT 38 10/12/2021 09:02 AM       POC Tests:   Recent Labs     03/29/23  0734   POCGLU 183*       Coags:   Lab Results   Component Value Date/Time    PROTIME 12.4 10/12/2021 09:02 AM    INR 0.9 10/12/2021 09:02 AM    APTT 22.8 09/28/2021 03:50 AM       HCG (If Applicable): No results found for: PREGTESTUR, PREGSERUM, HCG, HCGQUANT     ABGs: No results found for: PHART, PO2ART, TZP9TCG, YQK7JUH, BEART, P3HKZVKY     Type & Screen (If Applicable):  No results found for: LABABO, LABRH    Drug/Infectious Status (If Applicable):  No results found for: HIV, HEPCAB    COVID-19 Screening (If Applicable):   Lab Results   Component Value Date/Time    COVID19 Not Detected 12/23/2021 12:00 AM           Anesthesia

## 2023-03-29 NOTE — H&P
aspirin 81 mg, Oral, EVERY BEDTIME    Dapagliflozin-metFORMIN HCl ER (XIGDUO XR) 5-1000 MG TB24 EVERY BEDTIME    metoprolol succinate (TOPROL XL) 50 mg, EVERY BEDTIME    rosuvastatin (CRESTOR) 20 MG tablet TAKE 1 TABLET BY MOUTH EVERY DAY    zolpidem (AMBIEN CR) 12.5 mg, Oral, NIGHTLY PRN        No Known Allergies    ROS: negative    There were no vitals filed for this visit. Physical Exam  Constitutional:       Appearance: He is well-developed. HENT:      Head: Normocephalic and atraumatic. Abdominal:      General: There is no distension. Palpations: Abdomen is soft. Tenderness: There is no abdominal tenderness. Skin:     General: Skin is warm and dry    Assessment / Plan    colonoscopy    The diagnoses and plan were discussed with the patient. All questions answered. Plan of care agreed to by all concerned.

## 2023-04-05 ENCOUNTER — CLINICAL DOCUMENTATION (OUTPATIENT)
Age: 63
End: 2023-04-05

## 2023-04-05 NOTE — PROGRESS NOTES
Patient noticed some dark stool and blood over the past 2 days. No pain or discomfort. Patient had colo with Dr. Diehl on 3/29/23. Discussed with Dr. Diehl. Notified patient to hold aspirin, ibuprofen and supplements for now and to let us know if bleeding worsens or if he develops pain. He understands.

## 2024-01-03 ENCOUNTER — TRANSCRIBE ORDERS (OUTPATIENT)
Facility: HOSPITAL | Age: 64
End: 2024-01-03

## 2024-01-03 ENCOUNTER — HOSPITAL ENCOUNTER (OUTPATIENT)
Facility: HOSPITAL | Age: 64
Discharge: HOME | End: 2024-01-06
Attending: SURGERY
Payer: COMMERCIAL

## 2024-01-03 DIAGNOSIS — I71.40 ABDOMINAL AORTIC ANEURYSM (AAA) WITHOUT RUPTURE, UNSPECIFIED PART (HCC): ICD-10-CM

## 2024-01-03 DIAGNOSIS — I71.40 ABDOMINAL AORTIC ANEURYSM (AAA) WITHOUT RUPTURE, UNSPECIFIED PART (HCC): Primary | ICD-10-CM

## 2024-01-03 LAB — CREAT UR-MCNC: 0.9 MG/DL (ref 0.6–1.3)

## 2024-01-03 PROCEDURE — 74174 CTA ABD&PLVS W/CONTRAST: CPT

## 2024-01-03 PROCEDURE — 82565 ASSAY OF CREATININE: CPT

## 2024-01-03 PROCEDURE — 6360000004 HC RX CONTRAST MEDICATION: Performed by: SURGERY

## 2024-01-03 RX ADMIN — IOPAMIDOL 80 ML: 755 INJECTION, SOLUTION INTRAVENOUS at 15:47

## 2024-01-10 ENCOUNTER — HOSPITAL ENCOUNTER (OUTPATIENT)
Facility: HOSPITAL | Age: 64
Discharge: HOME | End: 2024-01-13
Payer: COMMERCIAL

## 2024-01-10 DIAGNOSIS — I71.40 ABDOMINAL AORTIC ANEURYSM (AAA) WITHOUT RUPTURE, UNSPECIFIED PART (HCC): ICD-10-CM

## 2024-01-10 LAB
ANION GAP SERPL CALC-SCNC: 5 MMOL/L (ref 3–18)
BASOPHILS # BLD: 0.1 K/UL (ref 0–0.1)
BASOPHILS NFR BLD: 2 % (ref 0–2)
BUN SERPL-MCNC: 14 MG/DL (ref 7–18)
BUN/CREAT SERPL: 15 (ref 12–20)
CALCIUM SERPL-MCNC: 9.4 MG/DL (ref 8.5–10.1)
CHLORIDE SERPL-SCNC: 105 MMOL/L (ref 100–111)
CO2 SERPL-SCNC: 28 MMOL/L (ref 21–32)
CREAT SERPL-MCNC: 0.93 MG/DL (ref 0.6–1.3)
DIFFERENTIAL METHOD BLD: ABNORMAL
EOSINOPHIL # BLD: 0.3 K/UL (ref 0–0.4)
EOSINOPHIL NFR BLD: 8 % (ref 0–5)
ERYTHROCYTE [DISTWIDTH] IN BLOOD BY AUTOMATED COUNT: 12.6 % (ref 11.6–14.5)
GLUCOSE SERPL-MCNC: 175 MG/DL (ref 74–99)
HCT VFR BLD AUTO: 43.3 % (ref 36–48)
HGB BLD-MCNC: 14.8 G/DL (ref 13–16)
IMM GRANULOCYTES # BLD AUTO: 0 K/UL (ref 0–0.04)
IMM GRANULOCYTES NFR BLD AUTO: 0 % (ref 0–0.5)
LYMPHOCYTES # BLD: 1.4 K/UL (ref 0.9–3.6)
LYMPHOCYTES NFR BLD: 31 % (ref 21–52)
MCH RBC QN AUTO: 33 PG (ref 24–34)
MCHC RBC AUTO-ENTMCNC: 34.2 G/DL (ref 31–37)
MCV RBC AUTO: 96.4 FL (ref 78–100)
MONOCYTES # BLD: 0.6 K/UL (ref 0.05–1.2)
MONOCYTES NFR BLD: 14 % (ref 3–10)
NEUTS SEG # BLD: 2 K/UL (ref 1.8–8)
NEUTS SEG NFR BLD: 46 % (ref 40–73)
NRBC # BLD: 0 K/UL (ref 0–0.01)
NRBC BLD-RTO: 0 PER 100 WBC
PLATELET # BLD AUTO: 162 K/UL (ref 135–420)
PMV BLD AUTO: 10.2 FL (ref 9.2–11.8)
POTASSIUM SERPL-SCNC: 4.3 MMOL/L (ref 3.5–5.5)
RBC # BLD AUTO: 4.49 M/UL (ref 4.35–5.65)
SODIUM SERPL-SCNC: 138 MMOL/L (ref 136–145)
WBC # BLD AUTO: 4.4 K/UL (ref 4.6–13.2)

## 2024-01-10 PROCEDURE — 36415 COLL VENOUS BLD VENIPUNCTURE: CPT

## 2024-01-10 PROCEDURE — 85025 COMPLETE CBC W/AUTO DIFF WBC: CPT

## 2024-01-10 PROCEDURE — 71046 X-RAY EXAM CHEST 2 VIEWS: CPT

## 2024-01-10 PROCEDURE — 80048 BASIC METABOLIC PNL TOTAL CA: CPT

## 2024-01-10 NOTE — PROGRESS NOTES
Instructions for your surgery at [facility]      Today's Date:  1/10/2024      Patient's Name:  Afshin Diehl           Surgery Date:  01/15/2024              Please enter the main entrance of the hospital and check-in at the  located in the lobby. Once checked in at the , you will take the elevators to the second floor, and report to the waiting room on the left. The room will say Procedure Registration.    Do NOT eat or drink anything, including candy, gum, or ice chips after midnight prior to your surgery, unless you have specific instructions from your surgeon or anesthesia provider to do so.  Brush your teeth before coming to the hospital. You may swish with water, but do not swallow.  No smoking/Vaping/E-Cigarettes 24 hours prior to the day of surgery.  No alcohol 24 hours prior to the day of surgery.  No recreational drugs for one week prior to the day of surgery.  Bring Photo ID, Insurance information, and Co-pay if required on day of surgery.  Bring in pertinent legal documents, such as, Medical Power of , DNR, Advance Directive, etc.  Leave all valuables, including money/purse, at home.  Remove all jewelry, including ALL body piercings, nail polish, acrylic nails, and makeup (including mascara); no lotions, powders, deodorant, or perfume/cologne/after shave on the skin.  Follow instruction for Hibiclens washes and CHG wipes from surgeon's office.   Glasses and dentures may be worn to the hospital. They must be removed prior to surgery. Please bring case/container for glasses or dentures.   Contact lenses should not be worn on day of surgery.   Call your doctor's office if symptoms of a cold or illness develop within 24-48 hours prior to your surgery.  Call your doctor's office if you have any questions concerning insurance or co-pays.  15. AN ADULT (relative or friend 18 years or older) MUST DRIVE YOU HOME AFTER YOUR SURGERY.  16. Please make

## 2024-01-11 ENCOUNTER — HOSPITAL ENCOUNTER (OUTPATIENT)
Facility: HOSPITAL | Age: 64
Discharge: HOME | End: 2024-01-11
Payer: COMMERCIAL

## 2024-01-11 LAB
INR PPP: 1 (ref 0.9–1.1)
PROTHROMBIN TIME: 13.4 SEC (ref 11.9–14.7)

## 2024-01-11 PROCEDURE — 36415 COLL VENOUS BLD VENIPUNCTURE: CPT

## 2024-01-11 PROCEDURE — 85610 PROTHROMBIN TIME: CPT

## 2024-01-12 ENCOUNTER — ANESTHESIA EVENT (OUTPATIENT)
Dept: CARDIOTHORACIC SURGERY | Facility: HOSPITAL | Age: 64
DRG: 272 | End: 2024-01-12
Payer: COMMERCIAL

## 2024-01-15 ENCOUNTER — HOSPITAL ENCOUNTER (INPATIENT)
Facility: HOSPITAL | Age: 64
LOS: 2 days | Discharge: HOME | DRG: 272 | End: 2024-01-17
Attending: SURGERY | Admitting: SURGERY
Payer: COMMERCIAL

## 2024-01-15 ENCOUNTER — APPOINTMENT (OUTPATIENT)
Facility: HOSPITAL | Age: 64
DRG: 272 | End: 2024-01-15
Attending: SURGERY
Payer: COMMERCIAL

## 2024-01-15 ENCOUNTER — ANESTHESIA (OUTPATIENT)
Dept: CARDIOTHORACIC SURGERY | Facility: HOSPITAL | Age: 64
DRG: 272 | End: 2024-01-15
Payer: COMMERCIAL

## 2024-01-15 DIAGNOSIS — I71.43 INFRARENAL ABDOMINAL AORTIC ANEURYSM (AAA) WITHOUT RUPTURE (HCC): Primary | ICD-10-CM

## 2024-01-15 PROBLEM — I71.40 AAA (ABDOMINAL AORTIC ANEURYSM) WITHOUT RUPTURE (HCC): Status: ACTIVE | Noted: 2024-01-15

## 2024-01-15 LAB
ABO + RH BLD: NORMAL
BLOOD GROUP ANTIBODIES SERPL: NORMAL
EST. AVERAGE GLUCOSE BLD GHB EST-MCNC: 163 MG/DL
GLUCOSE BLD STRIP.AUTO-MCNC: 178 MG/DL (ref 70–110)
GLUCOSE BLD STRIP.AUTO-MCNC: 252 MG/DL (ref 70–110)
HBA1C MFR BLD: 7.3 % (ref 4.2–5.6)
SPECIMEN EXP DATE BLD: NORMAL

## 2024-01-15 PROCEDURE — C1768 GRAFT, VASCULAR: HCPCS | Performed by: SURGERY

## 2024-01-15 PROCEDURE — 2720000010 HC SURG SUPPLY STERILE: Performed by: SURGERY

## 2024-01-15 PROCEDURE — 86850 RBC ANTIBODY SCREEN: CPT

## 2024-01-15 PROCEDURE — 2580000003 HC RX 258: Performed by: SURGERY

## 2024-01-15 PROCEDURE — A4217 STERILE WATER/SALINE, 500 ML: HCPCS | Performed by: SURGERY

## 2024-01-15 PROCEDURE — 6360000004 HC RX CONTRAST MEDICATION: Performed by: SURGERY

## 2024-01-15 PROCEDURE — 2709999900 HC NON-CHARGEABLE SUPPLY: Performed by: SURGERY

## 2024-01-15 PROCEDURE — C1894 INTRO/SHEATH, NON-LASER: HCPCS | Performed by: SURGERY

## 2024-01-15 PROCEDURE — C1713 ANCHOR/SCREW BN/BN,TIS/BN: HCPCS | Performed by: SURGERY

## 2024-01-15 PROCEDURE — A4216 STERILE WATER/SALINE, 10 ML: HCPCS | Performed by: NURSE ANESTHETIST, CERTIFIED REGISTERED

## 2024-01-15 PROCEDURE — 3600000002 HC SURGERY LEVEL 2 BASE: Performed by: SURGERY

## 2024-01-15 PROCEDURE — 6360000002 HC RX W HCPCS: Performed by: SURGERY

## 2024-01-15 PROCEDURE — 2500000003 HC RX 250 WO HCPCS: Performed by: NURSE ANESTHETIST, CERTIFIED REGISTERED

## 2024-01-15 PROCEDURE — C1725 CATH, TRANSLUMIN NON-LASER: HCPCS | Performed by: SURGERY

## 2024-01-15 PROCEDURE — 6370000000 HC RX 637 (ALT 250 FOR IP): Performed by: NURSE ANESTHETIST, CERTIFIED REGISTERED

## 2024-01-15 PROCEDURE — 2000000000 HC ICU R&B

## 2024-01-15 PROCEDURE — B41D1ZZ FLUOROSCOPY OF AORTA AND BILATERAL LOWER EXTREMITY ARTERIES USING LOW OSMOLAR CONTRAST: ICD-10-PCS | Performed by: SURGERY

## 2024-01-15 PROCEDURE — 3600000012 HC SURGERY LEVEL 2 ADDTL 15MIN: Performed by: SURGERY

## 2024-01-15 PROCEDURE — 3700000000 HC ANESTHESIA ATTENDED CARE: Performed by: SURGERY

## 2024-01-15 PROCEDURE — 86900 BLOOD TYPING SEROLOGIC ABO: CPT

## 2024-01-15 PROCEDURE — C1769 GUIDE WIRE: HCPCS | Performed by: SURGERY

## 2024-01-15 PROCEDURE — 3700000001 HC ADD 15 MINUTES (ANESTHESIA): Performed by: SURGERY

## 2024-01-15 PROCEDURE — 047D3DZ DILATION OF LEFT COMMON ILIAC ARTERY WITH INTRALUMINAL DEVICE, PERCUTANEOUS APPROACH: ICD-10-PCS | Performed by: SURGERY

## 2024-01-15 PROCEDURE — 82962 GLUCOSE BLOOD TEST: CPT

## 2024-01-15 PROCEDURE — 83036 HEMOGLOBIN GLYCOSYLATED A1C: CPT

## 2024-01-15 PROCEDURE — 2580000003 HC RX 258: Performed by: NURSE ANESTHETIST, CERTIFIED REGISTERED

## 2024-01-15 PROCEDURE — 86901 BLOOD TYPING SEROLOGIC RH(D): CPT

## 2024-01-15 PROCEDURE — 04VC3DZ RESTRICTION OF RIGHT COMMON ILIAC ARTERY WITH INTRALUMINAL DEVICE, PERCUTANEOUS APPROACH: ICD-10-PCS | Performed by: SURGERY

## 2024-01-15 PROCEDURE — 6370000000 HC RX 637 (ALT 250 FOR IP): Performed by: SURGERY

## 2024-01-15 PROCEDURE — 6360000002 HC RX W HCPCS: Performed by: NURSE ANESTHETIST, CERTIFIED REGISTERED

## 2024-01-15 PROCEDURE — 2500000003 HC RX 250 WO HCPCS: Performed by: SURGERY

## 2024-01-15 PROCEDURE — C1753 CATH, INTRAVAS ULTRASOUND: HCPCS | Performed by: SURGERY

## 2024-01-15 PROCEDURE — C1887 CATHETER, GUIDING: HCPCS | Performed by: SURGERY

## 2024-01-15 DEVICE — GRAFT EVAR L103MM DIA25X14MM CATH 18FR NIT HI DENS: Type: IMPLANTABLE DEVICE | Site: AORTA | Status: FUNCTIONAL

## 2024-01-15 DEVICE — GRAFT EVAR L124MM DIA16X16MM CATH 14FR LIMB DST DSGN C DEL: Type: IMPLANTABLE DEVICE | Site: OTHER | Status: FUNCTIONAL

## 2024-01-15 DEVICE — GRAFT EVAR L93MM DIAM 16MM PROX 13MM DST IL LIMB ENDURANT: Type: IMPLANTABLE DEVICE | Site: OTHER | Status: FUNCTIONAL

## 2024-01-15 RX ORDER — SODIUM CHLORIDE 0.9 % (FLUSH) 0.9 %
5-40 SYRINGE (ML) INJECTION EVERY 12 HOURS SCHEDULED
Status: DISCONTINUED | OUTPATIENT
Start: 2024-01-15 | End: 2024-01-17 | Stop reason: HOSPADM

## 2024-01-15 RX ORDER — HEPARIN SODIUM 1000 [USP'U]/ML
INJECTION, SOLUTION INTRAVENOUS; SUBCUTANEOUS PRN
Status: DISCONTINUED | OUTPATIENT
Start: 2024-01-15 | End: 2024-01-15 | Stop reason: SDUPTHER

## 2024-01-15 RX ORDER — DEXTROSE AND SODIUM CHLORIDE 5; .45 G/100ML; G/100ML
INJECTION, SOLUTION INTRAVENOUS CONTINUOUS
Status: DISCONTINUED | OUTPATIENT
Start: 2024-01-15 | End: 2024-01-16 | Stop reason: ALTCHOICE

## 2024-01-15 RX ORDER — SODIUM CHLORIDE 9 MG/ML
INJECTION, SOLUTION INTRAVENOUS CONTINUOUS PRN
Status: DISCONTINUED | OUTPATIENT
Start: 2024-01-15 | End: 2024-01-15 | Stop reason: SDUPTHER

## 2024-01-15 RX ORDER — DEXTROSE MONOHYDRATE 100 MG/ML
INJECTION, SOLUTION INTRAVENOUS CONTINUOUS PRN
Status: DISCONTINUED | OUTPATIENT
Start: 2024-01-15 | End: 2024-01-15 | Stop reason: HOSPADM

## 2024-01-15 RX ORDER — INSULIN LISPRO 100 [IU]/ML
0-15 INJECTION, SOLUTION INTRAVENOUS; SUBCUTANEOUS ONCE
Status: COMPLETED | OUTPATIENT
Start: 2024-01-15 | End: 2024-01-15

## 2024-01-15 RX ORDER — PROPOFOL 10 MG/ML
INJECTION, EMULSION INTRAVENOUS PRN
Status: DISCONTINUED | OUTPATIENT
Start: 2024-01-15 | End: 2024-01-15 | Stop reason: SDUPTHER

## 2024-01-15 RX ORDER — HEPARIN SODIUM 200 [USP'U]/100ML
INJECTION, SOLUTION INTRAVENOUS CONTINUOUS PRN
Status: COMPLETED | OUTPATIENT
Start: 2024-01-15 | End: 2024-01-15

## 2024-01-15 RX ORDER — FENTANYL CITRATE 50 UG/ML
INJECTION, SOLUTION INTRAMUSCULAR; INTRAVENOUS PRN
Status: DISCONTINUED | OUTPATIENT
Start: 2024-01-15 | End: 2024-01-15 | Stop reason: SDUPTHER

## 2024-01-15 RX ORDER — SODIUM CHLORIDE 0.9 % (FLUSH) 0.9 %
5-40 SYRINGE (ML) INJECTION PRN
Status: DISCONTINUED | OUTPATIENT
Start: 2024-01-15 | End: 2024-01-15 | Stop reason: HOSPADM

## 2024-01-15 RX ORDER — IODIXANOL 320 MG/ML
INJECTION, SOLUTION INTRAVASCULAR
Status: DISPENSED
Start: 2024-01-15 | End: 2024-01-15

## 2024-01-15 RX ORDER — ONDANSETRON 4 MG/1
4 TABLET, ORALLY DISINTEGRATING ORAL EVERY 8 HOURS PRN
Status: DISCONTINUED | OUTPATIENT
Start: 2024-01-15 | End: 2024-01-17 | Stop reason: HOSPADM

## 2024-01-15 RX ORDER — MIDAZOLAM HYDROCHLORIDE 1 MG/ML
INJECTION INTRAMUSCULAR; INTRAVENOUS PRN
Status: DISCONTINUED | OUTPATIENT
Start: 2024-01-15 | End: 2024-01-15 | Stop reason: SDUPTHER

## 2024-01-15 RX ORDER — ASPIRIN 81 MG/1
81 TABLET, CHEWABLE ORAL
Status: DISCONTINUED | OUTPATIENT
Start: 2024-01-15 | End: 2024-01-17 | Stop reason: HOSPADM

## 2024-01-15 RX ORDER — METOPROLOL SUCCINATE 25 MG/1
50 TABLET, EXTENDED RELEASE ORAL
Status: DISCONTINUED | OUTPATIENT
Start: 2024-01-15 | End: 2024-01-17 | Stop reason: HOSPADM

## 2024-01-15 RX ORDER — SODIUM CHLORIDE, SODIUM LACTATE, POTASSIUM CHLORIDE, CALCIUM CHLORIDE 600; 310; 30; 20 MG/100ML; MG/100ML; MG/100ML; MG/100ML
INJECTION, SOLUTION INTRAVENOUS CONTINUOUS
Status: DISCONTINUED | OUTPATIENT
Start: 2024-01-15 | End: 2024-01-16 | Stop reason: ALTCHOICE

## 2024-01-15 RX ORDER — GLYCOPYRROLATE 0.2 MG/ML
INJECTION INTRAMUSCULAR; INTRAVENOUS PRN
Status: DISCONTINUED | OUTPATIENT
Start: 2024-01-15 | End: 2024-01-15 | Stop reason: SDUPTHER

## 2024-01-15 RX ORDER — HEPARIN SODIUM 200 [USP'U]/100ML
INJECTION, SOLUTION INTRAVENOUS
Status: DISPENSED
Start: 2024-01-15 | End: 2024-01-15

## 2024-01-15 RX ORDER — OXYCODONE HYDROCHLORIDE 5 MG/1
5 TABLET ORAL EVERY 4 HOURS PRN
Status: DISCONTINUED | OUTPATIENT
Start: 2024-01-15 | End: 2024-01-17 | Stop reason: HOSPADM

## 2024-01-15 RX ORDER — MAGNESIUM HYDROXIDE 1200 MG/15ML
LIQUID ORAL CONTINUOUS PRN
Status: COMPLETED | OUTPATIENT
Start: 2024-01-15 | End: 2024-01-15

## 2024-01-15 RX ORDER — ROSUVASTATIN CALCIUM 20 MG/1
20 TABLET, COATED ORAL DAILY
Status: DISCONTINUED | OUTPATIENT
Start: 2024-01-15 | End: 2024-01-17 | Stop reason: HOSPADM

## 2024-01-15 RX ORDER — ONDANSETRON 2 MG/ML
4 INJECTION INTRAMUSCULAR; INTRAVENOUS EVERY 6 HOURS PRN
Status: DISCONTINUED | OUTPATIENT
Start: 2024-01-15 | End: 2024-01-17 | Stop reason: HOSPADM

## 2024-01-15 RX ORDER — PHENYLEPHRINE HYDROCHLORIDE 10 MG/ML
INJECTION INTRAVENOUS PRN
Status: DISCONTINUED | OUTPATIENT
Start: 2024-01-15 | End: 2024-01-15 | Stop reason: SDUPTHER

## 2024-01-15 RX ORDER — ONDANSETRON 2 MG/ML
INJECTION INTRAMUSCULAR; INTRAVENOUS PRN
Status: DISCONTINUED | OUTPATIENT
Start: 2024-01-15 | End: 2024-01-15 | Stop reason: SDUPTHER

## 2024-01-15 RX ORDER — OXYCODONE HYDROCHLORIDE 10 MG/1
10 TABLET ORAL EVERY 4 HOURS PRN
Status: DISCONTINUED | OUTPATIENT
Start: 2024-01-15 | End: 2024-01-17 | Stop reason: HOSPADM

## 2024-01-15 RX ORDER — SODIUM CHLORIDE 0.9 % (FLUSH) 0.9 %
5-40 SYRINGE (ML) INJECTION EVERY 12 HOURS SCHEDULED
Status: DISCONTINUED | OUTPATIENT
Start: 2024-01-15 | End: 2024-01-15 | Stop reason: HOSPADM

## 2024-01-15 RX ORDER — IODIXANOL 320 MG/ML
INJECTION, SOLUTION INTRAVASCULAR PRN
Status: DISCONTINUED | OUTPATIENT
Start: 2024-01-15 | End: 2024-01-15 | Stop reason: HOSPADM

## 2024-01-15 RX ORDER — SODIUM CHLORIDE 9 MG/ML
INJECTION, SOLUTION INTRAVENOUS PRN
Status: DISCONTINUED | OUTPATIENT
Start: 2024-01-15 | End: 2024-01-17 | Stop reason: HOSPADM

## 2024-01-15 RX ORDER — SODIUM CHLORIDE 9 MG/ML
INJECTION, SOLUTION INTRAVENOUS PRN
Status: DISCONTINUED | OUTPATIENT
Start: 2024-01-15 | End: 2024-01-15 | Stop reason: HOSPADM

## 2024-01-15 RX ORDER — LIDOCAINE HYDROCHLORIDE 20 MG/ML
INJECTION, SOLUTION EPIDURAL; INFILTRATION; INTRACAUDAL; PERINEURAL PRN
Status: DISCONTINUED | OUTPATIENT
Start: 2024-01-15 | End: 2024-01-15 | Stop reason: SDUPTHER

## 2024-01-15 RX ORDER — DEXMEDETOMIDINE HYDROCHLORIDE 100 UG/ML
INJECTION, SOLUTION INTRAVENOUS PRN
Status: DISCONTINUED | OUTPATIENT
Start: 2024-01-15 | End: 2024-01-15 | Stop reason: SDUPTHER

## 2024-01-15 RX ORDER — LIDOCAINE HYDROCHLORIDE 10 MG/ML
1 INJECTION, SOLUTION EPIDURAL; INFILTRATION; INTRACAUDAL; PERINEURAL
Status: DISCONTINUED | OUTPATIENT
Start: 2024-01-15 | End: 2024-01-15 | Stop reason: HOSPADM

## 2024-01-15 RX ORDER — ZOLPIDEM TARTRATE 5 MG/1
5 TABLET ORAL NIGHTLY PRN
Status: DISCONTINUED | OUTPATIENT
Start: 2024-01-15 | End: 2024-01-17 | Stop reason: HOSPADM

## 2024-01-15 RX ORDER — ROCURONIUM BROMIDE 10 MG/ML
INJECTION, SOLUTION INTRAVENOUS PRN
Status: DISCONTINUED | OUTPATIENT
Start: 2024-01-15 | End: 2024-01-15 | Stop reason: SDUPTHER

## 2024-01-15 RX ORDER — NEOSTIGMINE METHYLSULFATE 1 MG/ML
INJECTION, SOLUTION INTRAVENOUS PRN
Status: DISCONTINUED | OUTPATIENT
Start: 2024-01-15 | End: 2024-01-15 | Stop reason: SDUPTHER

## 2024-01-15 RX ORDER — SODIUM CHLORIDE 0.9 % (FLUSH) 0.9 %
5-40 SYRINGE (ML) INJECTION PRN
Status: DISCONTINUED | OUTPATIENT
Start: 2024-01-15 | End: 2024-01-17 | Stop reason: HOSPADM

## 2024-01-15 RX ADMIN — SODIUM CHLORIDE: 9 INJECTION, SOLUTION INTRAVENOUS at 07:53

## 2024-01-15 RX ADMIN — SODIUM CHLORIDE, POTASSIUM CHLORIDE, SODIUM LACTATE AND CALCIUM CHLORIDE: 600; 310; 30; 20 INJECTION, SOLUTION INTRAVENOUS at 06:47

## 2024-01-15 RX ADMIN — ASPIRIN 81 MG: 81 TABLET, CHEWABLE ORAL at 20:32

## 2024-01-15 RX ADMIN — ROCURONIUM BROMIDE 50 MG: 10 INJECTION, SOLUTION INTRAVENOUS at 07:58

## 2024-01-15 RX ADMIN — OXYCODONE HYDROCHLORIDE 5 MG: 5 TABLET ORAL at 11:57

## 2024-01-15 RX ADMIN — FENTANYL CITRATE 100 MCG: 50 INJECTION INTRAMUSCULAR; INTRAVENOUS at 07:57

## 2024-01-15 RX ADMIN — ROSUVASTATIN CALCIUM 20 MG: 20 TABLET, FILM COATED ORAL at 20:27

## 2024-01-15 RX ADMIN — INSULIN LISPRO 9 UNITS: 100 INJECTION, SOLUTION INTRAVENOUS; SUBCUTANEOUS at 06:56

## 2024-01-15 RX ADMIN — OXYCODONE HYDROCHLORIDE 5 MG: 5 TABLET ORAL at 20:22

## 2024-01-15 RX ADMIN — SODIUM CHLORIDE, PRESERVATIVE FREE 10 ML: 5 INJECTION INTRAVENOUS at 21:00

## 2024-01-15 RX ADMIN — WATER 2000 MG: 1 INJECTION, SOLUTION INTRAMUSCULAR; INTRAVENOUS; SUBCUTANEOUS at 08:30

## 2024-01-15 RX ADMIN — FAMOTIDINE 20 MG: 10 INJECTION INTRAVENOUS at 06:46

## 2024-01-15 RX ADMIN — DEXTROSE AND SODIUM CHLORIDE: 5; 450 INJECTION, SOLUTION INTRAVENOUS at 11:09

## 2024-01-15 RX ADMIN — PHENYLEPHRINE HYDROCHLORIDE 100 MCG: 10 INJECTION INTRAVENOUS at 09:34

## 2024-01-15 RX ADMIN — DEXTROSE AND SODIUM CHLORIDE: 5; 450 INJECTION, SOLUTION INTRAVENOUS at 20:36

## 2024-01-15 RX ADMIN — Medication 3 MG: at 10:28

## 2024-01-15 RX ADMIN — PHENYLEPHRINE HYDROCHLORIDE 100 MCG: 10 INJECTION INTRAVENOUS at 08:22

## 2024-01-15 RX ADMIN — PHENYLEPHRINE HYDROCHLORIDE 100 MCG: 10 INJECTION INTRAVENOUS at 09:24

## 2024-01-15 RX ADMIN — ONDANSETRON 4 MG: 2 INJECTION INTRAMUSCULAR; INTRAVENOUS at 10:21

## 2024-01-15 RX ADMIN — MIDAZOLAM 2 MG: 1 INJECTION, SOLUTION INTRAMUSCULAR; INTRAVENOUS at 07:53

## 2024-01-15 RX ADMIN — DEXMEDETOMIDINE HYDROCHLORIDE 4 MCG: 100 INJECTION, SOLUTION INTRAVENOUS at 07:54

## 2024-01-15 RX ADMIN — HEPARIN SODIUM 7000 UNITS: 1000 INJECTION INTRAVENOUS; SUBCUTANEOUS at 08:55

## 2024-01-15 RX ADMIN — LIDOCAINE HYDROCHLORIDE 50 MG: 20 INJECTION, SOLUTION EPIDURAL; INFILTRATION; INTRACAUDAL; PERINEURAL at 07:57

## 2024-01-15 RX ADMIN — METOPROLOL SUCCINATE 50 MG: 25 TABLET, EXTENDED RELEASE ORAL at 20:25

## 2024-01-15 RX ADMIN — SODIUM CHLORIDE, PRESERVATIVE FREE 10 ML: 5 INJECTION INTRAVENOUS at 11:58

## 2024-01-15 RX ADMIN — FENTANYL CITRATE 50 MCG: 50 INJECTION INTRAMUSCULAR; INTRAVENOUS at 08:42

## 2024-01-15 RX ADMIN — GLYCOPYRROLATE 0.4 MG: 0.4 INJECTION INTRAMUSCULAR; INTRAVENOUS at 10:28

## 2024-01-15 RX ADMIN — PHENYLEPHRINE HYDROCHLORIDE 50 MCG: 10 INJECTION INTRAVENOUS at 10:33

## 2024-01-15 RX ADMIN — FENTANYL CITRATE 50 MCG: 50 INJECTION INTRAMUSCULAR; INTRAVENOUS at 09:49

## 2024-01-15 RX ADMIN — PROPOFOL 150 MG: 10 INJECTION, EMULSION INTRAVENOUS at 07:58

## 2024-01-15 ASSESSMENT — PAIN SCALES - GENERAL
PAINLEVEL_OUTOF10: 5
PAINLEVEL_OUTOF10: 2
PAINLEVEL_OUTOF10: 4

## 2024-01-15 ASSESSMENT — PAIN DESCRIPTION - DESCRIPTORS
DESCRIPTORS: SORE
DESCRIPTORS: ACHING
DESCRIPTORS: SORE

## 2024-01-15 ASSESSMENT — PAIN DESCRIPTION - ORIENTATION
ORIENTATION: RIGHT;LEFT

## 2024-01-15 ASSESSMENT — PAIN DESCRIPTION - LOCATION
LOCATION: GROIN
LOCATION: GROIN;INCISION
LOCATION: GROIN

## 2024-01-15 ASSESSMENT — PAIN - FUNCTIONAL ASSESSMENT: PAIN_FUNCTIONAL_ASSESSMENT: 0-10

## 2024-01-15 NOTE — ANESTHESIA POSTPROCEDURE EVALUATION
Department of Anesthesiology  Postprocedure Note    Patient: Afshin Diehl  MRN: [id number]  YOB: 1960  Date of evaluation: 1/15/2024    Procedure Summary       Date: 01/15/24 Room / Location: [facility] 02 / [facility] CARDIAC SURGERY    Anesthesia Start: 0753 Anesthesia Stop: 1057    Procedure: ABDOMINAL AORTIC ANEURYSM REPAIR ENDOVASCULAR; C-ARM. BALLOON ANGIOPLASTY LEFT ILIAC (Bilateral: Groin) Diagnosis:       Abdominal aortic aneurysm (AAA) without rupture, unspecified part (HCC)      (Abdominal aortic aneurysm (AAA) without rupture, unspecified part (HCC) [I71.40])    Surgeons: Jorge Alberto Monterroso MD Responsible Provider: Christian Bajwa MD    Anesthesia Type: General ASA Status: 3            Anesthesia Type: General    Lilian Phase I: Lilian Score: 10    Lilian Phase II:      Anesthesia Post Evaluation    Patient location during evaluation: bedside  Patient participation: complete - patient participated  Airway patency: patent  Cardiovascular status: hemodynamically stable  Respiratory status: acceptable  Hydration status: stable    No notable events documented.

## 2024-01-15 NOTE — PROGRESS NOTES
conducted a pre-surgery visit with Afshin Diehl, who is a 63 y.o.,male.     The  provided the following Interventions:  Initiated a relationship of care and support with patient as he is about to have heart  surgery. There is no advance directive on file.  Offered prayer and assurance of continued prayers on patient's behalf.     Plan:  Chaplains will continue to follow and will provide pastoral care on an as needed/requested basis.   recommends bedside caregivers page  on duty if patient shows signs of acute spiritual or emotional distress.

## 2024-01-15 NOTE — H&P
Range    Hemoglobin A1C 7.3 (H) 4.2 - 5.6 %    eAG 163 mg/dL   POCT Glucose    Collection Time: 01/15/24  6:45 AM   Result Value Ref Range    POC Glucose 252 (H) 70 - 110 mg/dL       Data Review: CTA reviewed    Assessment:     Principal Problem:    AAA (abdominal aortic aneurysm) without rupture (HCC)  Resolved Problems:    * No resolved hospital problems. *      Plan:     Endovascular repair of aortic aneurysm    Signed By: Jorge Alberto Melara MD     January 15, 2024

## 2024-01-15 NOTE — OP NOTE
transferred to recovery in stable condition.    Electronically signed by Jorge Alberto Melara MD on 1/15/2024 at 10:52 AM

## 2024-01-15 NOTE — PLAN OF CARE
Problem: Chronic Conditions and Co-morbidities  Goal: Patient's chronic conditions and co-morbidity symptoms are monitored and maintained or improved  Outcome: Progressing  Flowsheets (Taken 1/15/2024 1549)  Care Plan - Patient's Chronic Conditions and Co-Morbidity Symptoms are Monitored and Maintained or Improved: Monitor and assess patient's chronic conditions and comorbid symptoms for stability, deterioration, or improvement     Problem: Safety - Adult  Goal: Free from fall injury  Outcome: Progressing  Flowsheets (Taken 1/15/2024 1549)  Free From Fall Injury:   Instruct family/caregiver on patient safety   Based on caregiver fall risk screen, instruct family/caregiver to ask for assistance with transferring infant if caregiver noted to have fall risk factors     Problem: Pain  Goal: Verbalizes/displays adequate comfort level or baseline comfort level  Outcome: Progressing  Flowsheets (Taken 1/15/2024 1549)  Verbalizes/displays adequate comfort level or baseline comfort level:   Encourage patient to monitor pain and request assistance   Administer analgesics based on type and severity of pain and evaluate response   Assess pain using appropriate pain scale   Implement non-pharmacological measures as appropriate and evaluate response     Problem: Discharge Planning  Goal: Discharge to home or other facility with appropriate resources  Outcome: Progressing  Flowsheets (Taken 1/15/2024 1549)  Discharge to home or other facility with appropriate resources:   Identify barriers to discharge with patient and caregiver   Identify discharge learning needs (meds, wound care, etc)

## 2024-01-15 NOTE — PERIOP NOTE
Patient /Family /Designee has been informed that [facility] is not responsible for patient belongings per policy and the signed [facility] Patient Agreement document.  Personal items should be sent home or checked in with security.  Patient /Family /Designee selected the following action:                            [x]  Send personal items home with a family member,wife- Darby                                                 []  Check in personal items with security, excluding clothing                            []  Maintain personal items at the bedside, against recommendation                                 by Valdo Ziegler [facility]                                   ** If patient /family /designee chooses to maintain personal items at the bedside,                                      Complete the patient belongings inventory in the EMR.

## 2024-01-15 NOTE — ANESTHESIA PRE PROCEDURE
Department of Anesthesiology  Preprocedure Note       Name:  Afshin Diehl   Age:  63 y.o.  :  1960                                          MRN:  [id number]         Date:  1/15/2024      Surgeon: Surgeon(s):  Jorge Alberto Monterroso MD    Procedure: Procedure(s):  ABDOMINAL AORTIC ANEURYSM REPAIR ENDOVASCULAR; C-ARM    Medications prior to admission:   Prior to Admission medications    Medication Sig Start Date End Date Taking? Authorizing Provider   zolpidem (AMBIEN CR) 6.25 MG extended release tablet Take 2 tablets by mouth nightly as needed. 23   Provider, MD Jac   aspirin 81 MG chewable tablet Take 1 tablet by mouth nightly    Automatic Reconciliation, Ar   Dapagliflozin-metFORMIN HCl ER (XIGDUO XR) 5-1000 MG TB24 nightly 21   Automatic Reconciliation, Ar   metoprolol succinate (TOPROL XL) 50 MG extended release tablet 1 tablet nightly 21   Automatic Reconciliation, Ar   rosuvastatin (CRESTOR) 20 MG tablet TAKE 1 TABLET BY MOUTH EVERY DAY 21   Automatic Reconciliation, Ar       Current medications:    Current Facility-Administered Medications   Medication Dose Route Frequency Provider Last Rate Last Admin   • lidocaine PF 1 % injection 1 mL  1 mL IntraDERmal Once PRN Brein, Kindra, APRN - CRNA       • lactated ringers IV soln infusion   IntraVENous Continuous Brein, [employer], APRN - CRNA 50 mL/hr at 01/15/24 0647 New Bag at 01/15/24 0647   • glucose chewable tablet 16 g  4 tablet Oral PRN Brein, [employer], APRN - CRNA       • dextrose bolus 10% 125 mL  125 mL IntraVENous PRN Brein, Kindra, APRN - CRNA        Or   • dextrose bolus 10% 250 mL  250 mL IntraVENous PRN Brein, Kindra, APRN - CRNA       • glucagon injection 1 mg  1 mg SubCUTAneous PRN Brein, Kindra, APRN - CRNA       • dextrose 10 % infusion   IntraVENous Continuous PRN Brein, [employer], APRN - CRNA       • sodium chloride flush 0.9 % injection 5-40 mL  5-40 mL IntraVENous 2 times per day Jorge Alberto Monterroso MD       • sodium chloride flush

## 2024-01-15 NOTE — ANESTHESIA PROCEDURE NOTES
Arterial Line:    An arterial line was placed using surface landmarks, in the OR for the following indication(s): continuous blood pressure monitoring and blood sampling needed.    A 20 gauge (size) (length), Arrow (type) catheter was placed, into the left radial artery, secured by tape and Tegaderm.  Anesthesia type: General1/15/2024 8:12 AM1/15/2024 8:12 AM  Anesthesiologist: Christian Bajwa MD  Resident/CRNA: Matt Bentley APRN - CRNA  Performed: Resident/CRNA   Preanesthetic Checklist  Completed: patient identified, IV checked, site marked, risks and benefits discussed, surgical/procedural consents, equipment checked, pre-op evaluation, timeout performed, anesthesia consent given, oxygen available, monitors applied/VS acknowledged, fire risk safety assessment completed and verbalized and blood product R/B/A discussed and consented

## 2024-01-16 LAB
ANION GAP SERPL CALC-SCNC: 5 MMOL/L (ref 3–18)
BUN SERPL-MCNC: 9 MG/DL (ref 7–18)
BUN/CREAT SERPL: 11 (ref 12–20)
CALCIUM SERPL-MCNC: 7.9 MG/DL (ref 8.5–10.1)
CHLORIDE SERPL-SCNC: 103 MMOL/L (ref 100–111)
CO2 SERPL-SCNC: 27 MMOL/L (ref 21–32)
CREAT SERPL-MCNC: 0.83 MG/DL (ref 0.6–1.3)
ERYTHROCYTE [DISTWIDTH] IN BLOOD BY AUTOMATED COUNT: 12.6 % (ref 11.6–14.5)
GLUCOSE BLD STRIP.AUTO-MCNC: 205 MG/DL (ref 70–110)
GLUCOSE BLD STRIP.AUTO-MCNC: 213 MG/DL (ref 70–110)
GLUCOSE BLD STRIP.AUTO-MCNC: 225 MG/DL (ref 70–110)
GLUCOSE SERPL-MCNC: 222 MG/DL (ref 74–99)
HCT VFR BLD AUTO: 34.4 % (ref 36–48)
HGB BLD-MCNC: 11.9 G/DL (ref 13–16)
MCH RBC QN AUTO: 32.7 PG (ref 24–34)
MCHC RBC AUTO-ENTMCNC: 34.6 G/DL (ref 31–37)
MCV RBC AUTO: 94.5 FL (ref 78–100)
NRBC # BLD: 0 K/UL (ref 0–0.01)
NRBC BLD-RTO: 0 PER 100 WBC
PLATELET # BLD AUTO: 121 K/UL (ref 135–420)
PMV BLD AUTO: 10.1 FL (ref 9.2–11.8)
POTASSIUM SERPL-SCNC: 3.8 MMOL/L (ref 3.5–5.5)
RBC # BLD AUTO: 3.64 M/UL (ref 4.35–5.65)
SODIUM SERPL-SCNC: 135 MMOL/L (ref 136–145)
WBC # BLD AUTO: 8.5 K/UL (ref 4.6–13.2)

## 2024-01-16 PROCEDURE — 94761 N-INVAS EAR/PLS OXIMETRY MLT: CPT

## 2024-01-16 PROCEDURE — 82962 GLUCOSE BLOOD TEST: CPT

## 2024-01-16 PROCEDURE — 1100000000 HC RM PRIVATE

## 2024-01-16 PROCEDURE — 2580000003 HC RX 258: Performed by: SURGERY

## 2024-01-16 PROCEDURE — 97162 PT EVAL MOD COMPLEX 30 MIN: CPT

## 2024-01-16 PROCEDURE — 6370000000 HC RX 637 (ALT 250 FOR IP)

## 2024-01-16 PROCEDURE — 6370000000 HC RX 637 (ALT 250 FOR IP): Performed by: SURGERY

## 2024-01-16 PROCEDURE — 80048 BASIC METABOLIC PNL TOTAL CA: CPT

## 2024-01-16 PROCEDURE — 85027 COMPLETE CBC AUTOMATED: CPT

## 2024-01-16 RX ORDER — DEXTROSE MONOHYDRATE 100 MG/ML
INJECTION, SOLUTION INTRAVENOUS CONTINUOUS PRN
Status: DISCONTINUED | OUTPATIENT
Start: 2024-01-16 | End: 2024-01-17 | Stop reason: HOSPADM

## 2024-01-16 RX ORDER — INSULIN LISPRO 100 [IU]/ML
0-4 INJECTION, SOLUTION INTRAVENOUS; SUBCUTANEOUS NIGHTLY
Status: DISCONTINUED | OUTPATIENT
Start: 2024-01-16 | End: 2024-01-17 | Stop reason: HOSPADM

## 2024-01-16 RX ORDER — POLYETHYLENE GLYCOL 3350 17 G/17G
17 POWDER, FOR SOLUTION ORAL DAILY
Status: DISCONTINUED | OUTPATIENT
Start: 2024-01-16 | End: 2024-01-17 | Stop reason: HOSPADM

## 2024-01-16 RX ORDER — INSULIN LISPRO 100 [IU]/ML
0-4 INJECTION, SOLUTION INTRAVENOUS; SUBCUTANEOUS
Status: DISCONTINUED | OUTPATIENT
Start: 2024-01-16 | End: 2024-01-17 | Stop reason: HOSPADM

## 2024-01-16 RX ADMIN — SODIUM CHLORIDE, PRESERVATIVE FREE 10 ML: 5 INJECTION INTRAVENOUS at 08:07

## 2024-01-16 RX ADMIN — ASPIRIN 81 MG: 81 TABLET, CHEWABLE ORAL at 21:38

## 2024-01-16 RX ADMIN — OXYCODONE HYDROCHLORIDE 10 MG: 10 TABLET ORAL at 02:52

## 2024-01-16 RX ADMIN — METOPROLOL SUCCINATE 50 MG: 25 TABLET, EXTENDED RELEASE ORAL at 21:39

## 2024-01-16 RX ADMIN — OXYCODONE HYDROCHLORIDE 10 MG: 10 TABLET ORAL at 18:31

## 2024-01-16 RX ADMIN — DEXTROSE AND SODIUM CHLORIDE: 5; 450 INJECTION, SOLUTION INTRAVENOUS at 06:48

## 2024-01-16 RX ADMIN — INSULIN LISPRO 1 UNITS: 100 INJECTION, SOLUTION INTRAVENOUS; SUBCUTANEOUS at 12:46

## 2024-01-16 RX ADMIN — ROSUVASTATIN CALCIUM 20 MG: 20 TABLET, FILM COATED ORAL at 21:40

## 2024-01-16 RX ADMIN — SODIUM CHLORIDE, PRESERVATIVE FREE 10 ML: 5 INJECTION INTRAVENOUS at 21:12

## 2024-01-16 RX ADMIN — INSULIN LISPRO 1 UNITS: 100 INJECTION, SOLUTION INTRAVENOUS; SUBCUTANEOUS at 16:53

## 2024-01-16 RX ADMIN — OXYCODONE HYDROCHLORIDE 10 MG: 10 TABLET ORAL at 12:50

## 2024-01-16 RX ADMIN — SODIUM CHLORIDE, PRESERVATIVE FREE 10 ML: 5 INJECTION INTRAVENOUS at 21:10

## 2024-01-16 RX ADMIN — POLYETHYLENE GLYCOL 3350 17 G: 17 POWDER, FOR SOLUTION ORAL at 11:32

## 2024-01-16 RX ADMIN — OXYCODONE HYDROCHLORIDE 5 MG: 5 TABLET ORAL at 22:24

## 2024-01-16 RX ADMIN — OXYCODONE HYDROCHLORIDE 10 MG: 10 TABLET ORAL at 08:08

## 2024-01-16 ASSESSMENT — PAIN SCALES - GENERAL
PAINLEVEL_OUTOF10: 0
PAINLEVEL_OUTOF10: 0
PAINLEVEL_OUTOF10: 4
PAINLEVEL_OUTOF10: 0
PAINLEVEL_OUTOF10: 6
PAINLEVEL_OUTOF10: 3
PAINLEVEL_OUTOF10: 3
PAINLEVEL_OUTOF10: 6
PAINLEVEL_OUTOF10: 2
PAINLEVEL_OUTOF10: 3
PAINLEVEL_OUTOF10: 6
PAINLEVEL_OUTOF10: 5

## 2024-01-16 ASSESSMENT — PAIN DESCRIPTION - ORIENTATION
ORIENTATION: RIGHT;LEFT
ORIENTATION: LOWER
ORIENTATION: LOWER;RIGHT;LEFT

## 2024-01-16 ASSESSMENT — PAIN DESCRIPTION - DESCRIPTORS
DESCRIPTORS: ACHING
DESCRIPTORS: SORE
DESCRIPTORS: ACHING
DESCRIPTORS: ACHING
DESCRIPTORS: SORE
DESCRIPTORS: ACHING
DESCRIPTORS: ACHING

## 2024-01-16 ASSESSMENT — PAIN DESCRIPTION - LOCATION
LOCATION: ABDOMEN
LOCATION: GROIN
LOCATION: ABDOMEN;GROIN
LOCATION: ABDOMEN
LOCATION: ABDOMEN

## 2024-01-16 ASSESSMENT — PAIN DESCRIPTION - PAIN TYPE
TYPE: SURGICAL PAIN

## 2024-01-16 NOTE — CARE COORDINATION
01/16/24 1049   Service Assessment   Patient Orientation Alert and Oriented   Cognition Alert   History Provided By Patient   Primary Caregiver Self   Support Systems Spouse/Significant Other   PCP Verified by CM No  (pt stated he changed PCP, however he cant recall the name.)   Prior Functional Level Independent in ADLs/IADLs   Current Functional Level Independent in ADLs/IADLs   Can patient return to prior living arrangement Yes   Ability to make needs known: Good   Social/Functional History   Lives With Spouse   Type of Home House   Home Layout Two level;Able to Live on Main level with bedroom/bathroom   Home Access Stairs to enter with rails   Entrance Stairs - Number of Steps 5-6   Bathroom Shower/Tub Tub/Shower unit   Bathroom Toilet Standard   Bathroom Equipment Grab bars in shower   Receives Help From Family   ADL Assistance Independent   Homemaking Assistance Independent   Ambulation Assistance Independent   Transfer Assistance Independent   Active  Yes   Discharge Planning   Type of Residence House   Services At/After Discharge   Mode of Transport at Discharge Other (see comment)  (wife able to transport home)   Condition of Participation: Discharge Planning   The Plan for Transition of Care is related to the following treatment goals: anticipate home with spouse, pt agreed to using [facility] if ordered   The Patient and/or Patient Representative was provided with a Choice of Provider? Patient   The Patient and/Or Patient Representative agree with the Discharge Plan? Yes   Freedom of Choice list was provided with basic dialogue that supports the patient's individualized plan of care/goals, treatment preferences, and shares the quality data associated with the providers?  Yes  ([facility])     Case Management Assessment  Initial Evaluation    Date/Time of Evaluation: 1/16/2024 10:56 AM  Assessment Completed by: Farhana Inman    If patient is discharged prior to next notation, then this note serves as note

## 2024-01-16 NOTE — PROGRESS NOTES
PHYSICAL THERAPY EVALUATION/DISCHARGE    Patient: Afshin Diehl (63 y.o. male)  Date: 1/16/2024  Primary Diagnosis: Abdominal aortic aneurysm (AAA) without rupture, unspecified part (HCC) [I71.40]  AAA (abdominal aortic aneurysm) without rupture (HCC) [I71.40]  Procedure(s) (LRB):  ABDOMINAL AORTIC ANEURYSM REPAIR ENDOVASCULAR; C-ARM. BALLOON ANGIOPLASTY LEFT ILIAC (Bilateral) 1 Day Post-Op   Precautions: None  PLOF: Independent   ASSESSMENT AND RECOMMENDATIONS:  Per chart and patient, amb with ww earlier this date with RN without difficulties. Mod I for bed mobility. Mod I for transfers. Amb 150ft with steady gait and no AD. Completed 5 steps with good balance. Returned to seated in chair at RN request. Denies mobility concerns with discharge to home. Wife at bedside and participated in education. Call bell in reach.     Patient does not require further skilled physical therapy intervention at this level of care.    Further Equipment Recommendations for Discharge:  None    AM-PAC Inpatient Mobility Raw Score : 24    This [facility] score should be considered in conjunction with interdisciplinary team recommendations to determine the most appropriate discharge setting. Patient's social support, diagnosis, medical stability, and prior level of function should also be taken into consideration.    At this time and based on above AM-PAC score, no further PT is recommended upon discharge.     SUBJECTIVE:   Patient stated “Let's do it.”    OBJECTIVE DATA SUMMARY:     Past Medical History:   Diagnosis Date    CAD (coronary artery disease) 2011    coronary artery stent    Diabetes (HCC)     Hypercholesterolemia     Hypertension     per medical note 1/2023 care everyhwere    Infrarenal abdominal aortic aneurysm (AAA) without rupture (HCC)     per medical note care everywhere, 4.3 cm per imaging 6/2022    Personal history of tobacco use, presenting hazards to health     Retroperitoneal hematoma 09/27/2021    Retroperitoneal

## 2024-01-16 NOTE — PLAN OF CARE
Problem: Chronic Conditions and Co-morbidities  Goal: Patient's chronic conditions and co-morbidity symptoms are monitored and maintained or improved  Outcome: Progressing  Flowsheets (Taken 1/16/2024 0400 by Dina Whitaker RN)  Care Plan - Patient's Chronic Conditions and Co-Morbidity Symptoms are Monitored and Maintained or Improved:   Monitor and assess patient's chronic conditions and comorbid symptoms for stability, deterioration, or improvement   Collaborate with multidisciplinary team to address chronic and comorbid conditions and prevent exacerbation or deterioration     Problem: Safety - Adult  Goal: Free from fall injury  Outcome: Progressing     Problem: Pain  Goal: Verbalizes/displays adequate comfort level or baseline comfort level  Outcome: Progressing     Problem: Discharge Planning  Goal: Discharge to home or other facility with appropriate resources  Outcome: Progressing     Problem: ABCDS Injury Assessment  Goal: Absence of physical injury  Outcome: Progressing

## 2024-01-16 NOTE — PROGRESS NOTES
07:15  Received pt in bed from Dina Whitaker RN.  Pt A&O x 4.  Rates pain 5/10.  HANLEY.  Saul. Lower extremities warm with no edema.  Dorsalis pedis and posterior tibial pulses easily verified via doppler.  SCDs on and in place saul.  D51/2 NS infusing at 100 ml/hr.  Gusman draining clear, yellow urine.  ART line to left radial artery intact.    08:40  Updated Dr. Melara regarding pt.  OK to remove ART line and Gusman  08:55  GALINA Roque at bedside.    10:00  Gusman and ART line removed.    10:43  Pt ambulated without supplemental oxygen outside of unit to Stepdown nursing desk.  Sp02 remained at greater than and equal to 94%.  Tolerated ambulation well.  Denied dizziness.  Post ambulation /69 (map 82).  Pt currently up in recliner chair.   10:55  Spoke with Dr. Melara via phone.  OK to transfer pt to stepdown.   11:30  Pt maintaining sp02 greater than or equal to 94% on r/a since ambulation earlier.    13:00  Pt up to toilet for BM but no results.  Pt to bed for nap.  13:45  Pt sp02 dipping al low as 85% on r/a.  Oxygen at 4 liters via n/c applied.  Pt states no home 02 or cpap use.   14:30  GABY AVERY updated on pt.  Will discuss possible discharge today with Dr. Melara.  14:40  Pt ambulating hallway with PT on r/a.   Current sp02 97%.    Per PT no need for adaptive equipment.

## 2024-01-16 NOTE — PROGRESS NOTES
Vascular Surgery Progress Note    Admit Date: 1/15/2024  POD 1 Day Post-Op    Procedure:  Procedure(s):  ABDOMINAL AORTIC ANEURYSM REPAIR ENDOVASCULAR; C-ARM. BALLOON ANGIOPLASTY LEFT ILIAC      Subjective:     Patient has complaints of pain in lower abdomen that improves with flatus. Concerns with oxygen sats decreasing to 89% early this AM. Improved initially with IS then decreased. Currently upper 90s on 4lpm NC. Will get him OOB and ambulating today. Continue IS.     Objective:     Blood pressure 99/69, pulse 81, temperature 98.2 °F (36.8 °C), resp. rate 15, height 1.778 m (5' 10\"), weight 78.7 kg (173 lb 8 oz), SpO2 93 %.    Temp (24hrs), Av.7 °F (37.1 °C), Min:98.1 °F (36.7 °C), Max:99.2 °F (37.3 °C)      Physical Exam:  AO x3, Lungs diminished, resp even nonlabored, cardiac rate and rhythm reg, Abd soft. Eating ok, No BM, Good output via william, Groins stable with Proveena intact. Feet warm, palpable pedal pulses    Labs: Results:       Chemistry Recent Labs     24  0305   *   K 3.8      CO2 27   BUN 9      CBC w/Diff Recent Labs     24  0305   WBC 8.5   RBC 3.64*   HGB 11.9*   HCT 34.4*   *      Microbiology Invalid input(s): \"CULT\"   Coagulation No results for input(s): \"INR\", \"APTT\" in the last 72 hours.    Invalid input(s): \"PTP\"      Data Review: images and reports reviewed    Assessment:     Principal Problem:    AAA (abdominal aortic aneurysm) without rupture (HCC)  Resolved Problems:    * No resolved hospital problems. *      Plan/Recommendations/Medical Decision Making:     Discontinue  william catheter and arterial line  Continue 81mg ASA  Glucose checks and sliding scale insulin added  PT consult  Ok to Ambulate  Miralax for constipation  Ok to transfer to  Step down only, will need to continue monitoring O2 sats, if sats stable consider DC home tomorrow    Electronically signed by VIJAY Ching CNP on 24 at 12:01 PM EST

## 2024-01-17 VITALS
SYSTOLIC BLOOD PRESSURE: 107 MMHG | HEART RATE: 89 BPM | WEIGHT: 171.96 LBS | DIASTOLIC BLOOD PRESSURE: 68 MMHG | BODY MASS INDEX: 24.62 KG/M2 | TEMPERATURE: 99.2 F | HEIGHT: 70 IN | OXYGEN SATURATION: 93 % | RESPIRATION RATE: 15 BRPM

## 2024-01-17 LAB — GLUCOSE BLD STRIP.AUTO-MCNC: 172 MG/DL (ref 70–110)

## 2024-01-17 PROCEDURE — 2700000000 HC OXYGEN THERAPY PER DAY

## 2024-01-17 PROCEDURE — 6370000000 HC RX 637 (ALT 250 FOR IP)

## 2024-01-17 PROCEDURE — 82962 GLUCOSE BLOOD TEST: CPT

## 2024-01-17 PROCEDURE — 6370000000 HC RX 637 (ALT 250 FOR IP): Performed by: SURGERY

## 2024-01-17 PROCEDURE — 2580000003 HC RX 258: Performed by: SURGERY

## 2024-01-17 PROCEDURE — 94761 N-INVAS EAR/PLS OXIMETRY MLT: CPT

## 2024-01-17 RX ORDER — HYDROCODONE BITARTRATE AND ACETAMINOPHEN 5; 325 MG/1; MG/1
1 TABLET ORAL EVERY 4 HOURS PRN
Qty: 42 TABLET | Refills: 0 | Status: SHIPPED | OUTPATIENT
Start: 2024-01-17 | End: 2024-01-24

## 2024-01-17 RX ADMIN — POLYETHYLENE GLYCOL 3350 17 G: 17 POWDER, FOR SOLUTION ORAL at 08:41

## 2024-01-17 RX ADMIN — SODIUM CHLORIDE, PRESERVATIVE FREE 10 ML: 5 INJECTION INTRAVENOUS at 08:11

## 2024-01-17 RX ADMIN — OXYCODONE HYDROCHLORIDE 5 MG: 5 TABLET ORAL at 04:38

## 2024-01-17 ASSESSMENT — PAIN DESCRIPTION - LOCATION: LOCATION: ABDOMEN

## 2024-01-17 ASSESSMENT — PAIN DESCRIPTION - DESCRIPTORS: DESCRIPTORS: ACHING

## 2024-01-17 ASSESSMENT — PAIN SCALES - GENERAL
PAINLEVEL_OUTOF10: 5
PAINLEVEL_OUTOF10: 2

## 2024-01-17 ASSESSMENT — PAIN DESCRIPTION - ORIENTATION: ORIENTATION: RIGHT

## 2024-01-17 NOTE — PROGRESS NOTES
Vascular Surgery Progress Note    Admit Date: 1/15/2024  POD 2 Days Post-Op    Procedure:  Procedure(s):  ABDOMINAL AORTIC ANEURYSM REPAIR ENDOVASCULAR; C-ARM. BALLOON ANGIOPLASTY LEFT ILIAC      Subjective:     Mr Diehl denies any complaints this morning. He is OOB to chair and finished his breakfast. He denies any abdominal pain or leg pain this morning.His O2 sats are improved this morning currently 98% on RA.     Objective:     Blood pressure 107/68, pulse 89, temperature 99.2 °F (37.3 °C), temperature source Oral, resp. rate 15, height 1.778 m (5' 10\"), weight 78 kg (171 lb 15.3 oz), SpO2 93 %.    Temp (24hrs), Av °F (37.2 °C), Min:98.2 °F (36.8 °C), Max:99.3 °F (37.4 °C)      Physical Exam:  AO x3, Lungs clear, resp even nonlabored, cardiac rate and rhythm reg, Abd soft. Eating ok, No BM, passing flatus. Voiding well, Groins stable with Proveena intact. Feet warm, palpable pedal pulses    Labs: Results:       Chemistry Recent Labs     24  0305   *   K 3.8      CO2 27   BUN 9      CBC w/Diff Recent Labs     24  0305   WBC 8.5   RBC 3.64*   HGB 11.9*   HCT 34.4*   *      Microbiology Invalid input(s): \"CULT\"   Coagulation No results for input(s): \"INR\", \"APTT\" in the last 72 hours.    Invalid input(s): \"PTP\"      Data Review: images and reports reviewed    Assessment:     Principal Problem:    AAA (abdominal aortic aneurysm) without rupture (HCC)  Resolved Problems:    * No resolved hospital problems. *      Plan/Recommendations/Medical Decision Making:     Will discharge home today  Continue 81mg ASA  Pain meds sent to [facility] in [place] via [facility] office  Follow up with Dr Monterroso on 2024 at 0945 at [facility]      Electronically signed by VIJAY Ching CNP on 24 at 12:01 PM EST

## 2024-01-17 NOTE — DISCHARGE INSTRUCTIONS
The vacuum dressing on your bilateral groin is called a \"prevena dressing\".  The dressing is intended to remain up to 5 days or whenever the motor quits working.  At that time, you may remove dressing and throw in trash (including motor).   Keep incisions open to air but you may apply a gauze dressing if slightly draining.  If the dressing pulls off early, cover incisions with gauze if drainage present.  Do not use anything on incisions (ointments, lotion, powders, etc).  Cleanse with soap and water and pat dry.  You may shower with prevena dressing but keep motor housing dry.    If any signs of infection (redness, swelling, warmth, increased pain or drainage) call Dr. Monterroso at [phone]

## 2024-01-17 NOTE — DISCHARGE SUMMARY
Physician Discharge Summary     Patient ID:  Afshin Diehl  [id number]  63 y.o.  1960    Admit date: 1/15/2024    Discharge date: 1/17/2024      Admitting Physician: Jorge Alberto Monterroso MD     Discharge Physician: Jorge Alberto Monterroso MD     Admission Diagnoses: Abdominal aortic aneurysm (AAA) without rupture, unspecified part (HCC) [I71.40]  AAA (abdominal aortic aneurysm) without rupture (HCC) [I71.40]    Discharge Diagnoses: There are no discharge diagnoses documented for the most recent discharge.    Procedures for this admission: Procedure(s):  ABDOMINAL AORTIC ANEURYSM REPAIR ENDOVASCULAR; C-ARM. BALLOON ANGIOPLASTY LEFT ILIAC    Discharged Condition: stable    Hospital Course: AAA     Afshin Diehl  went to the operating room and underwent endovascular repair of his enlarging abdominal aortic aneurysm.  The maximum aortic aneurysm diameter was 5 cm.  Patient  had a 3-piece [employer] bifurcated device placed with enhanced fixation.  An additional proximal aortic cuff was placed to better seal the proximal portion of the graft.  There is also a large accessory left renal artery that is preserved in the neck of the aneurysm, as the device was deployed just distal to this accessory renal artery.  Otherwise, he has done well, and his recovery in the cardiovascular intensive care unit has been uneventful.  On the morning after surgery, the patient is awake, alert, and oriented.  Hemodynamically stable.  Lower extremities show no evidence of ischemia.  The  abdomen is soft and non tender. Laboratory studies are unremarkable.  After ambulating and taking breakfast this morning, he should be ready for hospital discharge.    Consults: none    Significant Diagnostic Studies: labs: stable    Treatments: IV hydration, analgesia: Dilaudid and oxycodone, insulin: Humalog, respiratory therapy: O2, and surgery: EVAR    Discharge Exam:   GEN: Awake, alert   NECK: Full Range of motion, no cervical adenopathy, no carotid

## 2024-01-17 NOTE — PLAN OF CARE
Problem: Chronic Conditions and Co-morbidities  Goal: Patient's chronic conditions and co-morbidity symptoms are monitored and maintained or improved  Outcome: Adequate for Discharge     Problem: Safety - Adult  Goal: Free from fall injury  Outcome: Adequate for Discharge     Problem: Pain  Goal: Verbalizes/displays adequate comfort level or baseline comfort level  Outcome: Adequate for Discharge     Problem: Discharge Planning  Goal: Discharge to home or other facility with appropriate resources  Outcome: Adequate for Discharge     Problem: ABCDS Injury Assessment  Goal: Absence of physical injury  Outcome: Adequate for Discharge

## (undated) DEVICE — SUTURE MCRYL SZ 4-0 L27IN ABSRB UD L24MM PS-1 3/8 CIR PRIM Y935H

## (undated) DEVICE — PREVENA DUO PEEL & PLACE SYSTEM KIT- 13 CM: Brand: PREVENA DUO™ PEEL & PLACE™

## (undated) DEVICE — BLANKET WRM W29.9XL79.1IN UP BODY FORC AIR MISTRAL-AIR

## (undated) DEVICE — TUBING PRSS 48IN 1200PSI CLR HI PRSS INJ EXCITE FLX

## (undated) DEVICE — TRAY PREP DRY W/ PREM GLV 2 APPL 6 SPNG 2 UNDPD 1 OVERWRAP

## (undated) DEVICE — SYRINGE 20ML LL S/C 50

## (undated) DEVICE — AIRLIFE™ NASAL OXYGEN CANNULA CURVED, NONFLARED TIP WITH 14 FOOT (4.3 M) CRUSH-RESISTANT TUBING, OVER-THE-EAR STYLE: Brand: AIRLIFE™

## (undated) DEVICE — SHEATH INTRO 16FR L28CM 0.035IN GWIRE HYDRPHLC LOK

## (undated) DEVICE — APPLICATOR MEDICATED 26 CC SOLUTION HI LT ORNG CHLORAPREP

## (undated) DEVICE — SUTURE VCRL SZ 2-0 L27IN ABSRB VLT L26MM CT-2 1/2 CIR J333H

## (undated) DEVICE — BANDAGE ADH W0.75XL3IN UNIV WVN FAB NAT GEN USE STRP N ADH

## (undated) DEVICE — INTENDED FOR TISSUE SEPARATION, AND OTHER PROCEDURES THAT REQUIRE A SHARP SURGICAL BLADE TO PUNCTURE OR CUT.: Brand: BARD-PARKER ®  SAFETY SCALPED

## (undated) DEVICE — 3M™ IOBAN™ 2 ANTIMICROBIAL INCISE DRAPE 6651EZ: Brand: IOBAN™ 2

## (undated) DEVICE — CATHETER GUID 16FR L62CM DEFLECTED TIP REACH 22MM NK

## (undated) DEVICE — Device

## (undated) DEVICE — RADIFOCUS GLIDEWIRE: Brand: GLIDEWIRE

## (undated) DEVICE — SYR 10ML LUER LOK 1/5ML GRAD --

## (undated) DEVICE — GUIDEWIRE VASC L180CM DIA0.035IN TIP L7CM PTFE S STL STR

## (undated) DEVICE — Z DISCONTINUED PER MEDLINE SYRINGE ANGIO 150ML PWR INJ FAST LOAD J STRW TBNG SPIK

## (undated) DEVICE — AIRSEAL BIFURCATED SMOKE EVAC FILTERED TUBE SET: Brand: AIRSEAL

## (undated) DEVICE — SYRINGE MED 30ML STD CLR PLAS LUERLOCK TIP N CTRL DISP

## (undated) DEVICE — CATHETER ANGIO AD PED 4FR L65CM GWIRE 0.035IN PERIPH BERN

## (undated) DEVICE — LINER SUCT CANSTR 3000CC PLAS SFT PRE ASSEMB W/OUT TBNG W/

## (undated) DEVICE — GUIDEWIRE VASC L25CM DIA0.018IN S STL STR SFT TIP

## (undated) DEVICE — INTENDED FOR TISSUE SEPARATION, AND OTHER PROCEDURES THAT REQUIRE A SHARP SURGICAL BLADE TO PUNCTURE OR CUT.: Brand: BARD-PARKER SAFETY BLADES SIZE 10, STERILE

## (undated) DEVICE — SYRINGE KIT,PACKAGED,,150FT,MK 7(ANGIO-ARTERION, 150ML SYR KIT W/QFT,MC)(60729385): Brand: MEDRAD® MARK 7 ARTERION DISPOSABLE SYRINGE 150 ML WITH QUICK FILL TUBE

## (undated) DEVICE — GLOVE SURG SZ 7 L11.33IN FNGR THK9.8MIL STRW LTX POLYMER

## (undated) DEVICE — INFLATION DEVICE: Brand: ENCORE 26

## (undated) DEVICE — TRAP SPEC POLYP REM STRNR CLN DSGN MAGNIFYING WIND DISP

## (undated) DEVICE — CATHETER SUCT TR FL TIP 14FR W/ O CTRL

## (undated) DEVICE — GUIDEWIRE VASC L180CM DIA0035IN L15CM STR TIP PTFE HEP S STL

## (undated) DEVICE — SUTURE V-LOC 180 SZ 0 L9IN ABSRB GRN GS-21 L37MM 1/2 CIR VLOCL0346

## (undated) DEVICE — INFLATION DEVICE: Brand: ENCORE™ 26

## (undated) DEVICE — SNARE POLYP M W27MMXL240CM OVL STIFF DISP CAPTIVATOR

## (undated) DEVICE — SUTURE PERMA-HAND SZ 2-0 L24IN NONABSORBABLE BLK W/O NDL SA75H

## (undated) DEVICE — SHEATH INTRO 14FR L28CM 0.035IN GWIRE HYDRPHLC LOK

## (undated) DEVICE — ARM DRAPE

## (undated) DEVICE — Device: Brand: OLYMPUS

## (undated) DEVICE — GLOVE SURG SZ 7.5 L11.73IN FNGR THK9.8MIL STRW LTX POLYMER

## (undated) DEVICE — SOFT SILICONE HYDROCELLULAR SACRUM DRESSING WITH LOCK AWAY LAYER: Brand: ALLEVYN LIFE SACRUM (LARGE) PACK OF 10

## (undated) DEVICE — TRAY,URINE METER,100% SILICONE,16FR10ML: Brand: MEDLINE

## (undated) DEVICE — SUTURE MCRYL SZ 3-0 L27IN ABSRB UD L26MM SH 1/2 CIR Y416H

## (undated) DEVICE — ELECTRO LUBE IS A SINGLE PATIENT USE DEVICE THAT IS INTENDED TO BE USED ON ELECTROSURGICAL ELECTRODES TO REDUCE STICKING.: Brand: KEY SURGICAL ELECTRO LUBE

## (undated) DEVICE — SYRINGE MED 10ML LUERLOCK TIP W/O SFTY DISP

## (undated) DEVICE — Device: Brand: JELCO

## (undated) DEVICE — 450 ML BOTTLE OF 0.05% CHLORHEXIDINE GLUCONATE IN 99.95% STERILE WATER FOR IRRIGATION, USP AND APPLICATOR.: Brand: IRRISEPT ANTIMICROBIAL WOUND LAVAGE

## (undated) DEVICE — DRAPE TOWEL: Brand: CONVERTORS

## (undated) DEVICE — CATHETER PTCA 8FR L100CM BLLN DIA10-46MM SHTH 12FR GWIRE

## (undated) DEVICE — CATHETER ART 20 GAX4 CM 22 GA RADIAL FEP

## (undated) DEVICE — 3M™ RANGER™ BLOOD/FLUID WARMING STANDARD FLOW SET, 24250, 10/CASE: Brand: 3M™ RANGER™

## (undated) DEVICE — CATH IV SAFE STR 22GX1IN BLU -- PROTECTIV PLUS

## (undated) DEVICE — GARMENT,MEDLINE,DVT,INT,CALF,MED, GEN2: Brand: MEDLINE

## (undated) DEVICE — COVER LT HNDL BLU STRL -- MEDICHOICE

## (undated) DEVICE — COLUMN DRAPE

## (undated) DEVICE — DRAPE,TOP,102X53,STERILE: Brand: MEDLINE

## (undated) DEVICE — FORCEPS BX L240CM JAW DIA2.8MM L CAP W/ NDL MIC MESH TOOTH

## (undated) DEVICE — SYSTEM INF CTRL

## (undated) DEVICE — SOLUTION SCRB 4OZ 4% CHG CLN BASE FOR PT SKIN ANTISEPSIS

## (undated) DEVICE — TABLE COVER: Brand: CONVERTORS

## (undated) DEVICE — GUIDEWIRE VASC L260CM DIA0.035IN TIP L7CM PTFE S STL STR

## (undated) DEVICE — VISUALIZATION SYSTEM: Brand: CLEARIFY

## (undated) DEVICE — AGENT HEMSTAT 1GM PORCINE GEL ABSRB PWD FOR CONT OOZING

## (undated) DEVICE — TRAY PREP DRY SKIN W/ 5 COMPARTMENT W/ REM BSIN AND FCPS

## (undated) DEVICE — SYRINGE MED 25GA 3ML L5/8IN SUBQ PLAS W/ DETACH NDL SFTY

## (undated) DEVICE — INTENDED FOR TISSUE SEPARATION, AND OTHER PROCEDURES THAT REQUIRE A SHARP SURGICAL BLADE TO PUNCTURE OR CUT.: Brand: BARD-PARKER SAFETY BLADES SIZE 11, STERILE

## (undated) DEVICE — MASTISOL ADHESIVE LIQ 2/3ML

## (undated) DEVICE — MEDI-VAC NON-CONDUCTIVE SUCTION TUBING: Brand: CARDINAL HEALTH

## (undated) DEVICE — CANNULA ORIG TL CLR W FOAM CUSHIONS AND 14FT SUPL TB 3 CHN

## (undated) DEVICE — DECANTER BAG 9": Brand: MEDLINE INDUSTRIES, INC.

## (undated) DEVICE — PTA BALLOON DILATATION CATHETER: Brand: MUSTANG™

## (undated) DEVICE — NEEDLE HYPO 25GA L1.5IN BVL ORIENTED ECLIPSE

## (undated) DEVICE — APPLIER CLP 12FR L86CM W/ ENDOANCHOR CASS FOR EVAR HELI-FX 10 CLIPS PER APPLIER

## (undated) DEVICE — SUTURE NONABSORBABLE MONOFILAMENT 6-0 C-1 1X30 IN PROLENE 8706H

## (undated) DEVICE — COVER MPLR TIP CRV SCIS ACC DA VINCI

## (undated) DEVICE — PRESSURE MONITORING SET: Brand: TRUWAVE

## (undated) DEVICE — TUBING ANGIO L72IN OD0.142IN ID0.071IN 1200PSI POLYUR DST

## (undated) DEVICE — GOWN PLASTIC FILM THMBHKS UNIV BLUE: Brand: CARDINAL HEALTH

## (undated) DEVICE — SYRINGE MED 50ML LUERSLIP TIP

## (undated) DEVICE — BLADELESS OBTURATOR: Brand: WECK VISTA

## (undated) DEVICE — SUTURE MCRYL SZ 2-0 L36IN ABSRB UD L36MM CT-1 1/2 CIR Y945H

## (undated) DEVICE — CATHETER ANGIO BER2 038 4 FRX65 CM TEMPO AQUA

## (undated) DEVICE — CATHETER THOR 36FR DIA10.7MM POLYVI CHL TRCR TIP STR SFT

## (undated) DEVICE — SUTURE PERMA-HAND SZ 3-0 L24IN NONABSORBABLE BLK W/O NDL SA74H

## (undated) DEVICE — MAYO STAND COVER: Brand: CONVERTORS

## (undated) DEVICE — DRAPE C ARM UNIV W41XL74IN CLR PLAS XR VELC CLSR POLY STRP

## (undated) DEVICE — SUTURE PROL SZ 5-0 L36IN NONABSORBABLE BLU L13MM C-1 3/8 8720H

## (undated) DEVICE — DEVICE STBL AD TRICOT ANCHR PD FOR 3 W F CATH STATLOK

## (undated) DEVICE — YANKAUER,SMOOTH HANDLE,HIGH CAPACITY: Brand: MEDLINE INDUSTRIES, INC.

## (undated) DEVICE — CANNULA NSL AD TBNG L14FT STD PVC O2 CRV CONN NONFLARED NSL

## (undated) DEVICE — SOLUTION IV 1000ML 0.9% SOD CHL

## (undated) DEVICE — ELECTRODE PT RET AD L9FT HI MOIST COND ADH HYDRGEL CORDED

## (undated) DEVICE — APPLIER CLP L9.38IN M LIG TI DISP STR RNG HNDL LIGACLP

## (undated) DEVICE — GUIDEWIRE VASC L180CM DIA0.035IN L15CM STR TIP PTFE S STL

## (undated) DEVICE — ENDOSCOPY PUMP TUBING/ CAP SET: Brand: ERBE

## (undated) DEVICE — GLOVE SURG SZ 8 L11.77IN FNGR THK9.8MIL STRW LTX POLYMER

## (undated) DEVICE — STRIP,CLOSURE,WOUND,MEDI-STRIP,1/2X4: Brand: MEDLINE

## (undated) DEVICE — GLOVE SURG BIOGEL 8.0 STRL -- SKINSENSE

## (undated) DEVICE — Device: Brand: VISIONS PV .035 DIGITAL IVUS CATHETER

## (undated) DEVICE — CATHETER ANGIO 4FR L65CM 0.035IN VIS OMNI FLUSH-0 SFT TIP

## (undated) DEVICE — GAUZE,SPONGE,4"X4",16PLY,STRL,LF,10/TRAY: Brand: MEDLINE

## (undated) DEVICE — FORCEPS BX L240CM JAW DIA2.4MM ORNG L CAP W/ NDL DISP RAD

## (undated) DEVICE — LIQUIBAND RAPID ADHESIVE 36/CS 0.8ML: Brand: MEDLINE

## (undated) DEVICE — INTRODUCER SHTH 11FR CANN L23CM DIL TIP 45MM YEL W/O MINI

## (undated) DEVICE — INTRODUCER SHTH 11FR CANN L11CM DIL TIP 45MM YEL TUNGSTEN

## (undated) DEVICE — SUTURE MCRYL SZ 4-0 L18IN ABSRB UD L19MM PS-2 3/8 CIR PRIM Y496G

## (undated) DEVICE — 3M(TM) MEDIPORE(TM) +PAD SOFT CLOTH ADHESIVE WOUND DRESSING 3569: Brand: 3M™ MEDIPORE™

## (undated) DEVICE — NEEDLE ANGIO 1 WALL ULT SMOOTH 19GAX7CM MERIT AVANCE

## (undated) DEVICE — SEAL UNIV 5-8MM DISP BX/10 -- DA VINCI XI - SNGL USE

## (undated) DEVICE — CATHETER OMNI FLSH SZ 4FR 70CM 0.35IN GWIRE W/ RADPQ MRK 20

## (undated) DEVICE — UNDERPAD INCONT W23XL36IN STD BLU POLYPR BK FLUF SFT

## (undated) DEVICE — SOLUTION IRRIG 1000ML H2O STRL BLT

## (undated) DEVICE — PROBE VASC 8MHZ WTRPRF

## (undated) DEVICE — FLEX ADVANTAGE 1500CC: Brand: FLEX ADVANTAGE

## (undated) DEVICE — STERILE POLYISOPRENE POWDER-FREE SURGICAL GLOVES: Brand: PROTEXIS

## (undated) DEVICE — REM POLYHESIVE ADULT PATIENT RETURN ELECTRODE: Brand: VALLEYLAB

## (undated) DEVICE — BLANKET WRM AD W50XL85.8IN PACU FULL BODY FORC AIR

## (undated) DEVICE — KIT CLN UP BON SECOURS MARYV